# Patient Record
Sex: FEMALE | Race: WHITE | URBAN - METROPOLITAN AREA
[De-identification: names, ages, dates, MRNs, and addresses within clinical notes are randomized per-mention and may not be internally consistent; named-entity substitution may affect disease eponyms.]

---

## 2023-05-26 RX ORDER — FLUTICASONE PROPIONATE 50 MCG
2 SPRAY, SUSPENSION (ML) NASAL DAILY
COMMUNITY
Start: 2012-01-13

## 2023-05-26 RX ORDER — ZOLPIDEM TARTRATE 5 MG/1
TABLET ORAL
COMMUNITY
Start: 2022-05-03

## 2023-07-18 ENCOUNTER — TELEPHONE (OUTPATIENT)
Facility: HOSPITAL | Age: 53
End: 2023-07-18

## 2023-07-18 NOTE — TELEPHONE ENCOUNTER
[facility]  Breast Cancer Nurse Navigator Encounter    Name: Marion Leslie  Age: 47 y.o.  : 1970  Diagnosis: breast mass    Encounter type:  [x]Initial Navigator Encounter  []Patient Initiated  []Navigator Follow-up  []Other:     Narrative:   Called pt to introduce self/role of NN and to discuss upcoming consult with Dr. Eve Mendez. Pt states she is on the way to [facility] to have the breast mass biopsied. No official diagnosis as of yet, however, pt says the radiologist who performed her imaging suggested it was likely cancer. She has been having screening mammograms every 6 months at [facility] due to being high risk. She was advised to have a screening MRI which was done at the end of  at [facility]. She plans to bring a disc with images to her appointment on Monday. The MRI is what detected the mass. Per pt, it is measuring around 1.3 or 1.4cm and nothing else suspicious was noted in the breasts or lymph nodes. Reassured her that if this is an invasive cancer, she caught it early at stage I, which is curable. Pt, understandably, has been having a hard time since mass was detected and she was told it was likely cancer. She has discussed with her PCP and gotten a prescription for zoloft and clonazepam. The clonazepam has been helping but she may talk to her doctor about increasing her zoloft. She has support from friends and family, however no family is local. She has a sister that is 2 hours away. Provided pt my contact info and encouraged her to reach out as needed. Pt appreciative of the call. Will follow-up.      Interdisciplinary Team:  Surg-Onc:  Med-Onc:  Rad-Onc:  Plastics:  :   Nurse Navigator: Stephanie Min, RN      RAMÍREZ MetcalfN, RN, VIA [facility]  Breast Cancer Nurse [facility]  [address] [facility],[address] [facility]  W: [phone]  F: [phone]  [email or web address]   Good Help to Those in [facility] SPECIALTY [facility]

## 2023-07-21 ENCOUNTER — CLINICAL DOCUMENTATION (OUTPATIENT)
Age: 53
End: 2023-07-21

## 2023-07-21 NOTE — PROGRESS NOTES
Patient CD & Reports were dropped off at [facility] office by Hieu shea [facility].  Cd/reports were given to [address] [place] [address] for patient appointment  for 07/24/23

## 2023-07-24 ENCOUNTER — TELEPHONE (OUTPATIENT)
Age: 53
End: 2023-07-24

## 2023-07-24 ENCOUNTER — OFFICE VISIT (OUTPATIENT)
Age: 53
End: 2023-07-24
Payer: COMMERCIAL

## 2023-07-24 ENCOUNTER — CLINICAL DOCUMENTATION (OUTPATIENT)
Facility: HOSPITAL | Age: 53
End: 2023-07-24

## 2023-07-24 DIAGNOSIS — C50.912 INVASIVE DUCTAL CARCINOMA OF BREAST, FEMALE, LEFT (HCC): Primary | ICD-10-CM

## 2023-07-24 PROCEDURE — 99205 OFFICE O/P NEW HI 60 MIN: CPT | Performed by: SURGERY

## 2023-07-24 RX ORDER — CLONAZEPAM 0.5 MG/1
0.5 TABLET ORAL 2 TIMES DAILY PRN
COMMUNITY

## 2023-07-24 RX ORDER — CLONAZEPAM 0.5 MG/1
0.5 TABLET ORAL 2 TIMES DAILY PRN
COMMUNITY
End: 2023-07-24

## 2023-07-24 RX ORDER — CLONAZEPAM 0.25 MG/1
0.25 TABLET, ORALLY DISINTEGRATING ORAL 2 TIMES DAILY PRN
COMMUNITY
End: 2023-07-24 | Stop reason: CLARIF

## 2023-07-24 RX ORDER — SERTRALINE HYDROCHLORIDE 25 MG/1
25 TABLET, FILM COATED ORAL DAILY
COMMUNITY

## 2023-07-24 NOTE — PROGRESS NOTES
[facility]  Breast Navigator Encounter    Name:    Yehuda Soler  Age:    47 y.o. Diagnosis:   LEFT breast cancer    Called [facility] to check on receptors - not back yet. These will be faxed to me when available. Called Rogelio Camarena at [facility] to check to see if the patient had a MRI done there. She said that this was scheduled for 6/7 and 6/26, but both of these appointments were cancelled. She did have a contrast enhanced mammogram and U/S on 6/29/2023 which she will fax over. I did receive these and forward to Dr. Lolita Phan nurse.                               Sherryle Ok, RN, BSN, [facility]  Oncology Breast Navigator     [address], [address] [place] [address]  W: [phone]  F: [phone]  [email or web address]  Good Help to Those in [facility] SPECIALTY [facility]

## 2023-07-24 NOTE — PROGRESS NOTES
HISTORY OF PRESENT ILLNESS  Freya Jackson is a 47 y.o. female. HPI   NEW patient consult referred by Dr. Leti Bang for LEFT breast cancer. Found by imaging. Patient denies any changes to breasts. Patient would like BL nipple sparing mastectomies with reconstruction. : Negative genetic testing per patient, states this may have just been a BRCA1/2. Would like to meet with a genetic counselor. Family History: Mother, breast cancer, dx 80  Maternal aunt, breast cancer, dx 47-56s, lung cancer  Maternal aunt, ovarian cancer, dx 45s  Father, colon cancer, dx 62s,  at 67 from colon cancer           BL contrast enhanced mammo/L US, 23, [facility], BIRADS 5, LEFT breast irregular solid mass 3:00 3 cmfn 1.4 x 1.4 x 0.8 cm     Had MRI scheduled- was unable to complete           Review of Systems   Neurological:  Positive for headaches. Hematological:  Bruises/bleeds easily. 23: LEFT breast, Stellate Mass, Stereotatic biopsy: invasive and in situ carcinoma, grade 2. Largest tumor focus measured 4mm. No lymphovascular invasion seen. Prognostic markers pending. Past Medical History:   Diagnosis Date    Anxiety     Arrhythmia     MURMUR       Past Surgical History:   Procedure Laterality Date    APPENDECTOMY      COLONOSCOPY      GI      COLONOSCOPY    OR UNLISTED PROCEDURE ABDOMEN PERITONEUM & OMENTUM  2000    BOWEL OBSTRUCTION       Social History     Socioeconomic History    Marital status:      Spouse name: Not on file    Number of children: Not on file    Years of education: Not on file    Highest education level: Not on file   Occupational History    Not on file   Tobacco Use    Smoking status: Former     Packs/day: 0.50     Types: Cigarettes     Quit date: 2001     Years since quittin.0    Smokeless tobacco: Never   Substance and Sexual Activity    Alcohol use:  Yes     Alcohol/week: 5.0 standard drinks    Drug use: Not on file    Sexual

## 2023-07-24 NOTE — PROGRESS NOTES
HISTORY OF PRESENT ILLNESS  Artur Montanez is a 47 y.o. female     HPI NEW patient consult referred by Dr. Marivel Biggs for LEFT breast cancer. Found by imaging. Patient denies any changes to breasts. Patient would like BL nipple sparing mastectomies with reconstruction. : Negative genetic testing per patient, states this may have just been a BRCA1/2. Would like to meet with a genetic counselor. Family History: Mother, breast cancer, dx 80  Maternal aunt, breast cancer, dx 47-56s, lung cancer  Maternal aunt, ovarian cancer, dx 45s  Father, colon cancer, dx 62s,  at 67 from colon cancer        BL contrast enhanced mammo/L US, 23, [facility], BIRADS 5, LEFT breast irregular solid mass 3:00 3 cmfn 1.4 x 1.4 x 0.8 cm    Had MRI scheduled- was unable to complete        Review of Systems   Neurological:  Positive for headaches. Hematological:  Bruises/bleeds easily.        Physical Exam       ASSESSMENT and PLAN  {Assessment and Plan Chronic Disease:[id number]}

## 2023-07-24 NOTE — TELEPHONE ENCOUNTER
Mammaprint TRF faxed to [facility]. Confirmation fax received. Insurance letter mailed to patient.

## 2023-07-25 ENCOUNTER — CLINICAL DOCUMENTATION (OUTPATIENT)
Age: 53
End: 2023-07-25

## 2023-07-26 ENCOUNTER — CLINICAL DOCUMENTATION (OUTPATIENT)
Age: 53
End: 2023-07-26

## 2023-07-26 NOTE — PROGRESS NOTES
risk for certain cancers regardless of germline genetic test results. In this situation, health practitioners may recommend high-risk management or more frequent or sooner cancer screenings even when germline testing is negative. I counseled about cancer risk factors, including modifiable and non-modifiable risk. We reviewed lifestyle and behavioral strategies for reducing modifiable cancer risks. Implications for Family Members  We discussed the concept of autosomal dominant genes and implications for children and other family members. We discussed the concept of cascade testing for family members. The patient understands that if results are positive, children will have a 50% chance of inheriting that same positive result. She has two daughters in their early 25s. We discussed that her sisters and daughters are at higher risk for breast cancer due to family history. We discussed encouraging these individuals to talk with their OB/GYN or PCP regarding family history and personalized cancer screening plans based on risks. I reviewed that her M. Aunt with ovarian cancer could consider undergoing genetic testing and findings could help better illuminate risk for other family members. The patient explained that other than her mother, her maternal family members live in a different country and they do not have much contact or communication. Insurance  We discussed that (in most cases) the laboratory performing the genetic analysis will reach out to the patient's insurance after receiving the sample to determine an estimated OOP cost. I reviewed that the laboratory will usually EMAIL or TEXT the patient to discuss estimated OOP range with insurance or cost using self-pay options. For [employer], patients are only contacted if OOP cost is >$100. I encouraged the patient to respond to any communication from the genetics lab.  I explained that the patient must elect the self-pay option if s desired, otherwise

## 2023-07-26 NOTE — PROGRESS NOTES
Received vm from patient. She had a question about an oncology appointment on Aug. 24.  Recommended she call back with question.

## 2023-07-27 ENCOUNTER — OFFICE VISIT (OUTPATIENT)
Age: 53
End: 2023-07-27

## 2023-07-27 DIAGNOSIS — C50.912 INVASIVE DUCTAL CARCINOMA OF BREAST, FEMALE, LEFT (HCC): Primary | ICD-10-CM

## 2023-07-27 DIAGNOSIS — Z80.3 FAMILY HISTORY OF MALIGNANT NEOPLASM OF BREAST: ICD-10-CM

## 2023-07-27 DIAGNOSIS — Z80.41 FAMILY HX OF OVARIAN MALIGNANCY: ICD-10-CM

## 2023-07-27 DIAGNOSIS — Z80.8 FAMILY HISTORY OF MELANOMA: ICD-10-CM

## 2023-07-27 DIAGNOSIS — Z71.83 ENCOUNTER FOR NONPROCREATIVE GENETIC COUNSELING AND TESTING: ICD-10-CM

## 2023-07-27 DIAGNOSIS — Z80.0 FAMILY HISTORY OF COLON CANCER: ICD-10-CM

## 2023-07-27 DIAGNOSIS — Z13.71 ENCOUNTER FOR NONPROCREATIVE GENETIC COUNSELING AND TESTING: ICD-10-CM

## 2023-07-28 ENCOUNTER — TELEPHONE (OUTPATIENT)
Facility: HOSPITAL | Age: 53
End: 2023-07-28

## 2023-07-28 NOTE — TELEPHONE ENCOUNTER
[facility]  Breast Cancer Nurse Navigator Encounter    Name: Trudee Habermann  Age: 47 y.o.  : 1970  Diagnosis: Left breast IDC; ER+/MT+/HER2-    Encounter type:  []Initial Navigator Encounter  []Patient Initiated  [x]Navigator Follow-up  []Other:     Narrative:   Received message from TARAH Bird about pt's med/onc consult with Dr. Trevor Case. Initially, this consult was scheduled for  and there was a question of whether or not it could be sooner. We were able to reschedule pt's appointment to  at 1pm. A Mammaprint was ordered and allowing time for this to result may have been why it was scheduled further out. The order was placed on  so there is a strong likelihood it will be back in time for the appointment on . Pt understands that if it has not resulted we may have to push the appointment out. No questions about the Mammaprint or med/onc consult. She met with one of our [employer], yesterday. She is still unsure about which direction to go with additional testing. She understands she can call [facility] with any questions and will discuss more with Dr. Trevor Case.      Interdisciplinary Team:  Surg-Onc: Bautista Tirado MD  Med-Onc: Bay Adame MD  Rad-Onc:  Plastics:  :   Nurse Navigator: TARAH Cummings BSN, RN, VIA [facility]  Breast Cancer Nurse Navigator    [address]  W: [phone]  F: [phone]  [email or web address]   Good Help to Those in [facility]

## 2023-07-31 ENCOUNTER — TELEPHONE (OUTPATIENT)
Age: 53
End: 2023-07-31

## 2023-08-03 ENCOUNTER — TELEPHONE (OUTPATIENT)
Age: 53
End: 2023-08-03

## 2023-08-03 NOTE — TELEPHONE ENCOUNTER
Type of Film: [x] CD [] FILMS  Type of Test: [] MRI [x] MAMMO/US  From: [facility]  Given to: [facility]  LOCATION  To be Downloaded into PACS:  Too Law

## 2023-08-07 ENCOUNTER — TELEPHONE (OUTPATIENT)
Age: 53
End: 2023-08-07

## 2023-08-07 NOTE — TELEPHONE ENCOUNTER
Discussed Lymph node pathology and fact that recommendation my be for neoadjuvant chemo given high risk MP. She sees Dr. Harish Mena next week.

## 2023-08-07 NOTE — TELEPHONE ENCOUNTER
Telephone call from patient    She states she has had additional work up since we last spoke. She shared that she has also had time to think about the information we discussed and speak with family members. She states that she wishes to proceed with the [address] Cancer Next panel. She states she prefers this option over the disease focused panel because she worries that if anything arises in the future with new family history she does not want to regret undergoing testing with a larger panel. Her initial test was a more narrow, focused BRCA only panel. Reviewed limitations and benefits of Cancer Next panel. Discussed option of blood vs. Saliva differences and limitations and benefits of each option. Patient expressed a desire to proceed with saliva sample. If for any reason there is need for a repeat collection (sample quality etc) we discussed option of obtaining blood sample when she sees Dr. Kavin Fu. We discussed obtaining sample today at [facility] location. The patient was given time to ask questions. All questions answered. The patient communicated an understanding of and agreement with the plan.

## 2023-08-08 ENCOUNTER — HOSPITAL ENCOUNTER (OUTPATIENT)
Facility: HOSPITAL | Age: 53
Setting detail: INFUSION SERIES
Discharge: HOME | End: 2023-08-08

## 2023-08-08 ENCOUNTER — TELEPHONE (OUTPATIENT)
Age: 53
End: 2023-08-08

## 2023-08-08 VITALS
RESPIRATION RATE: 17 BRPM | BODY MASS INDEX: 17.65 KG/M2 | HEART RATE: 72 BPM | OXYGEN SATURATION: 97 % | TEMPERATURE: 97.9 F | SYSTOLIC BLOOD PRESSURE: 119 MMHG | HEIGHT: 64 IN | DIASTOLIC BLOOD PRESSURE: 77 MMHG | WEIGHT: 103.4 LBS

## 2023-08-08 ASSESSMENT — PAIN SCALES - GENERAL: PAINLEVEL_OUTOF10: 0

## 2023-08-08 NOTE — TELEPHONE ENCOUNTER
SW called pt this afternoon to introduce self and explain roles. Pt did not answer, SW left voicemail.

## 2023-08-09 ENCOUNTER — TELEPHONE (OUTPATIENT)
Age: 53
End: 2023-08-09

## 2023-08-09 NOTE — TELEPHONE ENCOUNTER
Colleague messaged SW to share that the pt's mother passed away this morning. Called pt to offer support and condolences. Pt sounded tearful and said that her mother's passing was unexpected. While on the phone, pt said that she was driving out of a parking deck which caused the call to break up. SW suggested to continue the conversation tomorrow so that the pt could focus on driving. Explained the roles of SW to the pt and planned for a phone call tomorrow. Also emailed the pt TEE's office phone number and offered further condolences. TEE will follow up with the pt tomorrow (8/10).

## 2023-08-09 NOTE — TELEPHONE ENCOUNTER
Telephone call from patient sharing that her mother  unexpectedly this morning. Provided support. We had previously discussed resources including [employer] and patient advocate, as well as nurse navigator and  at [facility]. Patient requested to be connected with  and stated that she would appreciate talking with Laila Frost (Patient Advocate for [employer]). Patient provided permission to reach out to both individuals as well as her physician team and share this update. I have reached out to these individuals and reviewed my contact information with the patient as well. Patient instructed to contact me with any needs or concerns. She expressed that she has good family support, including her spouse, a sister who is  a physician at [employer], and young adult children. The patient was given time to ask questions. All questions answered. The patient communicated an understanding of and agreement with the plan.      Libia SINGH RN ANP-BC OCN ACGN  Advanced Clinical Genetics Nurse  Oncology Nurse Practitioner  Mobile: [phone]   Fax: [phone]  [email or web address]    The [address] [facility], [address] [facility] [address], [address], [facility], [address] [facility], [address], [facility], [place] 2, [address]    To schedule an appointment Tel: [phone]

## 2023-08-10 ENCOUNTER — TELEPHONE (OUTPATIENT)
Age: 53
End: 2023-08-10

## 2023-08-14 ENCOUNTER — CLINICAL DOCUMENTATION (OUTPATIENT)
Facility: HOSPITAL | Age: 53
End: 2023-08-14

## 2023-08-14 ENCOUNTER — INITIAL CONSULT (OUTPATIENT)
Age: 53
End: 2023-08-14

## 2023-08-14 ENCOUNTER — CLINICAL DOCUMENTATION (OUTPATIENT)
Age: 53
End: 2023-08-14

## 2023-08-14 ENCOUNTER — RESEARCH ENCOUNTER (OUTPATIENT)
Age: 53
End: 2023-08-14

## 2023-08-14 VITALS
HEART RATE: 72 BPM | SYSTOLIC BLOOD PRESSURE: 120 MMHG | DIASTOLIC BLOOD PRESSURE: 68 MMHG | WEIGHT: 105.4 LBS | OXYGEN SATURATION: 98 % | TEMPERATURE: 98.6 F | HEIGHT: 64 IN | BODY MASS INDEX: 17.99 KG/M2 | RESPIRATION RATE: 18 BRPM

## 2023-08-14 DIAGNOSIS — Z51.81 ENCOUNTER FOR MONITORING CARDIOTOXIC DRUG THERAPY: ICD-10-CM

## 2023-08-14 DIAGNOSIS — Z79.899 ENCOUNTER FOR MONITORING CARDIOTOXIC DRUG THERAPY: ICD-10-CM

## 2023-08-14 DIAGNOSIS — Z17.0 MALIGNANT NEOPLASM OF CENTRAL PORTION OF LEFT BREAST IN FEMALE, ESTROGEN RECEPTOR POSITIVE (HCC): Primary | ICD-10-CM

## 2023-08-14 DIAGNOSIS — C50.112 MALIGNANT NEOPLASM OF CENTRAL PORTION OF LEFT BREAST IN FEMALE, ESTROGEN RECEPTOR POSITIVE (HCC): Primary | ICD-10-CM

## 2023-08-14 DIAGNOSIS — Z76.89 ENCOUNTER FOR PREVENTION OF NEUTROPENIA DUE TO CHEMOTHERAPY: ICD-10-CM

## 2023-08-14 DIAGNOSIS — Z51.11 ENCOUNTER FOR ANTINEOPLASTIC CHEMOTHERAPY: ICD-10-CM

## 2023-08-14 RX ORDER — LIDOCAINE AND PRILOCAINE 25; 25 MG/G; MG/G
CREAM TOPICAL
Qty: 30 G | Refills: 3 | Status: SHIPPED | OUTPATIENT
Start: 2023-08-14

## 2023-08-14 RX ORDER — ONDANSETRON HYDROCHLORIDE 8 MG/1
8 TABLET, FILM COATED ORAL EVERY 8 HOURS PRN
Qty: 60 TABLET | Refills: 3 | Status: SHIPPED | OUTPATIENT
Start: 2023-08-14

## 2023-08-14 RX ORDER — PROCHLORPERAZINE MALEATE 10 MG
10 TABLET ORAL EVERY 6 HOURS PRN
Qty: 60 TABLET | Refills: 3 | Status: SHIPPED | OUTPATIENT
Start: 2023-08-14

## 2023-08-14 RX ORDER — OLANZAPINE 2.5 MG/1
2.5 TABLET ORAL NIGHTLY
Qty: 30 TABLET | Refills: 3 | Status: SHIPPED | OUTPATIENT
Start: 2023-08-14

## 2023-08-14 NOTE — PROGRESS NOTES
[facility]  Breast Cancer Nurse Navigator Encounter    Name: Viola Fontanez  Age: 47 y.o.  : 1970  Diagnosis: Left breast IDC; ER+/AK+/HER2-    Encounter type:  []Initial Navigator Encounter  []Patient Initiated  [x]Navigator Follow-up  []Other:     Narrative:   Met with pt while in clinic for her med/onc consult. Reintroduced self/role. Pt accompanied by her , Mayte Gomez. Pt shares that her mother unexpectedly passed away last week and she just got home from [place] yesterday. She has been doing okay despite these additional stressors. She understands that she is here to discuss chemo. No questions or concerns for NN at this time. Pt has my contact info and will reach out as needed.      Interdisciplinary Team:  Surg-Onc: Juan Zarate MD  Med-Onc: Teresa Carmona MD  Rad-Onc:  Plastics:  : DANNIE Capellan  Nurse Navigator: TARAH Owens BSN, RN, VIA [facility]  Breast Cancer Nurse [address] [place] [address],[address]  W: [phone]  F: [phone]  [email or web address]   Good Help to Those in [facility] SPECIALTY [facility]

## 2023-08-14 NOTE — PROGRESS NOTES
Pt has been screened for all eligibility/ineligibility criteria and has been determined eligible for this protocol. Informed consent was reviewed by RN with patient prior to signing. Patient was informed that participation is voluntary and she has the right to withdraw at anytime without prejudice. The patient has been deemed of adequate mental and emotional capacity to give an informed consent per Dr. Colt Tran. Possible side effects were discussed in detail, with patient being advised to inform RN or Dr. Colt Tran immediately in the event of any side effects once drug is started or at any time should she have any questions. All questions were answered adequately by RN or Dr. Colt Tran. Patient signed consent today for study FB-12: \"Consent Form for Pre-Entry Screening to Obtain Breast Tumor Tissue for HER2 Signaling Function Test.\"  The patient was accompanied by her . A copy of ICF given to patient. No additional questions at this time.

## 2023-08-14 NOTE — PROGRESS NOTES
Pharmacy Note- Chemotherapy Education    Alex Valencia is a  47 y. o.female  diagnosed with breast cancer here today for chemotherapy counseling. Ms. Jennye Phoenix is being treated with [facility] followed by paclitaxel (weekly). Provided education on side effects and premedications. Side effects of chemotherapy reviewed with provider and patient chose to focus on supportive medications, diet and hydration instead. We discussed Neulasta, its purpose, frequency and side effects, as well as using Claritin and Tylenol to ameliorate the bone pain. We discussed all supportive medications and their drug interactions. The patient was given written instructions on how to take each one and how to avoid drug interactions between olanzapine, prochlorperazine and the anxiolytics and sleeping aids that she currently uses. Ms Jennye Phoenix verbalized understanding. We reviewed how important adequate hydration is in the setting of cyclophosphamide as well as the flu-like illness that may be caused by the paclitaxel and ways to alleviate that. Patient given ways to manage these side effects and when to contact office. [employer] Handout of medications provided to patient. Ms. Jennye Phoenix verbalized understanding of the information presented and all of the patient's questions were answered. Chemotherapy/Immunotherapy education and consent discussed with the patient and the patient denied any questions or concerns. A hard copy of the chemotherapy consent was offered to the patient and the patient declined (uploaded to [employer]).     Simona Jj, PharmD, BCPS    For Pharmacy Admin Tracking Only    Program: Medical Group  CPA in place:  Yes  Recommendation Provided To: Patient/Caregiver: 1 via In person  Intervention Detail: New Rx: 6, reason: Improve Adherence, Interaction, Needs Additional Therapy  Intervention Accepted By: Patient/Caregiver: 1  Time Spent (min): 60

## 2023-08-14 NOTE — PROGRESS NOTES
[facility] [place] at [address], [address] [place] [address]  Nicolasa Poster: [phone]  F: [phone]      Reason for Visit:   Danica Rojo is a 47 y.o. female who is seen in consultation at the request of Dr. Georgi Lebron for evaluation of therapy for breast cancer. Treatment History:   23 left breast stellate mass, core bx:  IDC, gr 2, 4 mm, ER + at 98%, CO + at 54%, HER 2 negative at [facility] 1+ (SOHA ration 1; sig/cell 1.85), ki67 59%, DCIS, no LVI  Mammaprint shows luminal B, high risk, index -0.160  23 left axilla LN core bx:  + for breast cancer  Genetic testing pending, negative BRCA 1/2 by [employer]     History of Present Illness: An abnormal mammogram led to the pathology above. FH:  mother with breast cancer, dx at age 80; maternal aunt with breast cancer, dx 47-56s and lung cancer; maternal aunt:  ovarian cancer, dx 45s; father with colon cancer dx 62s    Lmp 2022    Past Medical History:   Diagnosis Date    Anxiety     Arrhythmia     MURMUR    Invasive ductal carcinoma of breast, female, left ([address]) 2023: LEFT breast, Stellate Mass, Stereotatic biopsy: invasive and in situ carcinoma, grade 2. Largest tumor focus measured 4mm. No lymphovascular invasion seen. Prognostic markers pending. Past Surgical History:   Procedure Laterality Date    APPENDECTOMY      COLONOSCOPY      GI      COLONOSCOPY    CO UNLISTED PROCEDURE ABDOMEN PERITONEUM & OMENTUM  2000    BOWEL OBSTRUCTION    US BREAST BIOPSY W LOC DEVICE 1ST LESION LEFT Left 2023    US BREAST LYMPH NODE BIOPSY LEFT    US BREAST BIOPSY W LOC DEVICE 1ST LESION LEFT Left 2023    US BREAST LYMPH NODE BIOPSY LEFT      Social History     Tobacco Use    Smoking status: Former     Packs/day: 0.50     Types: Cigarettes     Quit date: 2001     Years since quittin.0    Smokeless tobacco: Never   Substance Use Topics    Alcohol use:  Yes     Alcohol/week: 5.0

## 2023-08-15 ENCOUNTER — TELEPHONE (OUTPATIENT)
Facility: HOSPITAL | Age: 53
End: 2023-08-15

## 2023-08-15 ENCOUNTER — RESEARCH ENCOUNTER (OUTPATIENT)
Age: 53
End: 2023-08-15

## 2023-08-15 ENCOUNTER — TELEPHONE (OUTPATIENT)
Age: 53
End: 2023-08-15

## 2023-08-15 DIAGNOSIS — C50.112 MALIGNANT NEOPLASM OF CENTRAL PORTION OF LEFT BREAST IN FEMALE, ESTROGEN RECEPTOR POSITIVE (HCC): Primary | ICD-10-CM

## 2023-08-15 DIAGNOSIS — Z51.11 ENCOUNTER FOR ANTINEOPLASTIC CHEMOTHERAPY: ICD-10-CM

## 2023-08-15 DIAGNOSIS — Z17.0 MALIGNANT NEOPLASM OF CENTRAL PORTION OF LEFT BREAST IN FEMALE, ESTROGEN RECEPTOR POSITIVE (HCC): Primary | ICD-10-CM

## 2023-08-15 NOTE — PROGRESS NOTES
Spoke with patient regarding some questions she had about starting chemo treatments. Answered all questions adequately. Patient requests to delay her chemo start date to 2023 if possible due to her mother's  service being  or . Dr. Celestine Dugan approved this request.  Notified scheduling, NN, and surgeon's office of the change. No further questions at this time.

## 2023-08-15 NOTE — TELEPHONE ENCOUNTER
Portacath insertion, US guided core bx of left breast mass, mac, [facility], 8/31

## 2023-08-15 NOTE — TELEPHONE ENCOUNTER
[facility]  Breast Cancer Nurse Navigator Encounter    Name: Angelia Murillo  Age: 47 y.o.  : 1970  Diagnosis: Left breast IDC; ER+/LA+/HER2-    Encounter type:  []Initial Navigator Encounter  [x]Patient Initiated  []Navigator Follow-up  []Other:     Narrative:   Pt called with questions about upcoming treatment plan       Interdisciplinary Team:  Surg-Onc: Rob Oglesby MD  Med-Onc: Emery Eubanks MD  Rad-Onc:  Plastics:  : DANNIE Moss  Nurse Navigator: Anayeli Smith RN      Anayeli Smith, BSN, RN, [facility]  Breast Cancer Nurse Navigator    [address]  W: [phone]  F: [phone]  [email or web address]   Good Help to Those in [facility]

## 2023-08-17 ENCOUNTER — TELEPHONE (OUTPATIENT)
Age: 53
End: 2023-08-17

## 2023-08-17 ENCOUNTER — RESEARCH ENCOUNTER (OUTPATIENT)
Age: 53
End: 2023-08-17

## 2023-08-17 ENCOUNTER — PREP FOR PROCEDURE (OUTPATIENT)
Age: 53
End: 2023-08-17

## 2023-08-17 DIAGNOSIS — C50.912 MALIGNANT NEOPLASM OF LEFT FEMALE BREAST, UNSPECIFIED ESTROGEN RECEPTOR STATUS, UNSPECIFIED SITE OF BREAST (HCC): Primary | ICD-10-CM

## 2023-08-17 NOTE — TELEPHONE ENCOUNTER
Spoke with the patient over the phone. She is very anxious about research study and would like Dr. Zenaida Rodas opinion on this. Has lots of questions. She does not want to go through all the extra scans/apts if the study will not be super beneficial to her. I was unable to answer her questions. I told her I would ask Dr. Sunday Valderrama to give her a call. She has to make her decision by tomorrow. She also asked if he give her sister who is a doctor (Dr. Danielle Cifuentes) a call. I will reach out to him.

## 2023-08-17 NOTE — PROGRESS NOTES
Spoke with patient regarding scheduling her screening assessments for clinical trial.  Patient is having second thoughts on trial.  Would like to discuss with other care providers and her family before moving forward. Advised patient to call me when she's made her decision. No further questions.

## 2023-08-17 NOTE — TELEPHONE ENCOUNTER
[address] [address]  Social Work Encounter    Name: Valdo Mathur  Age: 47 y.o.  : 1970  Diagnosis: Left breast IDC; ER+/CT+/HER2-    Encounter type:  []Initial Social Work Encounter  []Patient Initiated  [x]Social Work Follow-up  []Other:     Narrative:   Rec'd VM from pt (8/15) asking if SW knew of any psychiatrists to help with medication management. Communicated with onc SW as she has been helping the pt with this request as well. Called the pt back this morning to check-in and she explained that she was feeling anxious about moving forward with the research study. She would like to talk with her surgeon to get more information and his opinion. She shared that she needs to make a decision by the end of the day tomorrow since there are several test/scans that need to be completed prior to moving forward with the study. Pt said that she feels like her \"cup is overflowing\", since she has recently had some additional stressors. She received a call from another provider while on the phone with SW-- encouraged her to take the call, so phone call ended before SW could ask the pt about psychiatry recommendations. Let the pt know that she can call SW back at her convenience.        Interdisciplinary Team:  Surg-Onc: Alphonso Teran MD  Med-Onc: Leanne Medel MD  Rad-Onc:  Plastics:  : DANNIE Samuel  Nurse Navigator: TARAH Dinero MSW      [facility]  W: [phone]  [email or web address]   Good Help to Those in [facility] SPECIALTY [facility]

## 2023-08-18 ENCOUNTER — CLINICAL DOCUMENTATION (OUTPATIENT)
Age: 53
End: 2023-08-18

## 2023-08-18 ENCOUNTER — TELEPHONE (OUTPATIENT)
Age: 53
End: 2023-08-18

## 2023-08-18 NOTE — TELEPHONE ENCOUNTER
The patient called and wants to move her surgery to 8/29 instead of 8/31. Will reach out to surgery scheduler.

## 2023-08-18 NOTE — PROGRESS NOTES
Oncology Social Work  Psychosocial Assessment    Reason for Assessment:      [x] Social Work Referral [x] Initial Assessment [x] New Diagnosis [] Other    Advance Care Planning:  Demographics 8/31/2023   Marital Status        Sources of Information:    [x]Patient  [x]Family  [x]Staff  [x]Medical Record    Mental Status:    [x]Alert  []Lethargic  []Unresponsive   [] Unable to assess   Oriented to:  [x]Person  [x]Place  [x]Time  [x]Situation      Relationship Status:  []Single  [x]  []Significant Other/Life Partner  []  []  []    Living Circumstances:  []Lives Alone  [x]Family/Significant Other in Household  []Roommates  [x]Children in the Home  []Paid Caregivers  []Assisted Living Facility/Group [address] [place] [address]  []Homeless  []Incarcerated  []Environmental/Care Concerns  []Other:    Employment Status:  []Employed Full-time [x]Employed Part-time []Homemaker  [] Retired [] Short-Term Disability [] Long-Term Disability  [] Unemployed   [] [id number] [facility]   [] SSDI  [] Self-Employment    Barriers to Learning:    []Language  []Developmental  []Cognitive  []Altered Mental Status  []Visual/Hearing Impairment  []Unable to Read/Write  []Motivational  [] Challenges Understanding Medical Jargon []No Barriers Identified      Financial/Legal Concerns:    []Uninsured  []Limited Income/Resources  []Non-Citizen  []Food Insecurity [x]No Concerns Identified   []Other:    Zoroastrianism/Spiritual/Existential:  Does patient have any spiritual or Mandaeism beliefs? [] Yes [] No  Is the patient involved in a spiritual, good or Mandaeism community?  [] Yes [] No  Patient expressing spiritual/existential angst? [] Yes [x] No  Notes:    Support System:    Identified Support Person/Group: Cedrick () & Dr. Romeo Osorio (sister)  [x]Strong  []Fair  []Limited    Coping with Illness:   []  Coping Well  [x] Challenges Coping with Serious Illness [x] Situational Depression [x] Situational Anxiety []

## 2023-08-21 ENCOUNTER — TELEPHONE (OUTPATIENT)
Age: 53
End: 2023-08-21

## 2023-08-21 NOTE — TELEPHONE ENCOUNTER
Patient called and stated she can get the first shingles shot this Wednesday but the second dose will be once she has already started chemo. She stated she wants to know if Dr. Wilber Aparicio wants her to go ahead and get the first shingles shot. She also wants to know if she can go ahead and get the flu shot or if that will alter her lab results and pap smear results for Wednesday. Please advise and call patient back.     CB# [phone]

## 2023-08-21 NOTE — TELEPHONE ENCOUNTER
Rogelio Reynolds at [facility]  [phone]    08/21/23 5:34 PM EDT - Called patient back after double-checking with Dr. Kavin Fu. Advised patient that she can go ahead and get her shingles and flu vaccines. Advised that they shouldn't interfere with any testing as well. Answered all of the patients questions. Patient had no further questions at this time.

## 2023-08-22 ENCOUNTER — TELEPHONE (OUTPATIENT)
Age: 53
End: 2023-08-22

## 2023-08-22 ENCOUNTER — HOSPITAL ENCOUNTER (OUTPATIENT)
Facility: HOSPITAL | Age: 53
Setting detail: INFUSION SERIES
Discharge: HOME | End: 2023-08-22

## 2023-08-22 NOTE — PROGRESS NOTES
Ms. Taveras [place] presented to infusion center for cold cap education and cap fitting. Written and verbal education given along with contact information for [facility] as well as infusion center. Time allowed for questions. Patient fitted with cap (S/S). Tour of infusion given with expectations of first day. Enrollment form completed and signed.

## 2023-08-22 NOTE — TELEPHONE ENCOUNTER
Team:  Surg-Onc: Nandini Ramirez MD  Med-Onc: Johny Chisholm MD  Rad-Onc:  Plastics:  : Merlinda Ringer, MSW  Nurse Navigator: Renny Savant, RN Merlinda Ringer, MSW      [facility]  W: [phone]  [email or web address]   Good Help to Those in [facility]

## 2023-08-23 ENCOUNTER — HOSPITAL ENCOUNTER (OUTPATIENT)
Facility: HOSPITAL | Age: 53
Discharge: HOME | End: 2023-08-26
Payer: COMMERCIAL

## 2023-08-23 VITALS
BODY MASS INDEX: 17.48 KG/M2 | RESPIRATION RATE: 12 BRPM | HEART RATE: 64 BPM | WEIGHT: 102.4 LBS | HEIGHT: 64 IN | TEMPERATURE: 98.1 F | SYSTOLIC BLOOD PRESSURE: 110 MMHG | DIASTOLIC BLOOD PRESSURE: 70 MMHG

## 2023-08-23 LAB
BASOPHILS # BLD: 0.1 K/UL (ref 0–0.1)
BASOPHILS NFR BLD: 1 % (ref 0–1)
DIFFERENTIAL METHOD BLD: NORMAL
EOSINOPHIL # BLD: 0.1 K/UL (ref 0–0.4)
EOSINOPHIL NFR BLD: 1 % (ref 0–7)
ERYTHROCYTE [DISTWIDTH] IN BLOOD BY AUTOMATED COUNT: 12.3 % (ref 11.5–14.5)
HCT VFR BLD AUTO: 39 % (ref 35–47)
HGB BLD-MCNC: 12.8 G/DL (ref 11.5–16)
IMM GRANULOCYTES # BLD AUTO: 0 K/UL (ref 0–0.04)
IMM GRANULOCYTES NFR BLD AUTO: 0 % (ref 0–0.5)
LYMPHOCYTES # BLD: 1 K/UL (ref 0.8–3.5)
LYMPHOCYTES NFR BLD: 16 % (ref 12–49)
MCH RBC QN AUTO: 30 PG (ref 26–34)
MCHC RBC AUTO-ENTMCNC: 32.8 G/DL (ref 30–36.5)
MCV RBC AUTO: 91.3 FL (ref 80–99)
MONOCYTES # BLD: 0.6 K/UL (ref 0–1)
MONOCYTES NFR BLD: 10 % (ref 5–13)
NEUTS SEG # BLD: 4.7 K/UL (ref 1.8–8)
NEUTS SEG NFR BLD: 72 % (ref 32–75)
NRBC # BLD: 0 K/UL (ref 0–0.01)
NRBC BLD-RTO: 0 PER 100 WBC
PLATELET # BLD AUTO: 203 K/UL (ref 150–400)
PMV BLD AUTO: 10 FL (ref 8.9–12.9)
RBC # BLD AUTO: 4.27 M/UL (ref 3.8–5.2)
WBC # BLD AUTO: 6.5 K/UL (ref 3.6–11)

## 2023-08-23 PROCEDURE — 85025 COMPLETE CBC W/AUTO DIFF WBC: CPT

## 2023-08-23 PROCEDURE — 36415 COLL VENOUS BLD VENIPUNCTURE: CPT

## 2023-08-24 ENCOUNTER — TELEPHONE (OUTPATIENT)
Age: 53
End: 2023-08-24

## 2023-08-24 NOTE — TELEPHONE ENCOUNTER
Had 4 missed calls from the pt from 8/23 and one voicemail. Voicemail sounded like a bunch of background noise. Called pt back to follow up. Pt said that the calls were a mistake. Pt did not need any assistance at this time.

## 2023-08-25 ENCOUNTER — TELEPHONE (OUTPATIENT)
Age: 53
End: 2023-08-25

## 2023-08-25 DIAGNOSIS — C50.912 MALIGNANT NEOPLASM OF LEFT FEMALE BREAST, UNSPECIFIED ESTROGEN RECEPTOR STATUS, UNSPECIFIED SITE OF BREAST (HCC): Primary | ICD-10-CM

## 2023-08-25 NOTE — PERIOP NOTE
PT CALLED AND STATED THAT SHE WAS PRESCRIBED TOPICAL THYMOL 4% FOR A BACTERIAL TOE INFECTION. PT WAS WONDERING IF SHE COULD START THIS PRIOR TO HER SURGERY. ADVISED PT THAT SHE WOULD NEED TO CALL DR. MCKEON'S OFFICE TO LET THEM KNOW AND THEY WOULD NEED TO MAKE THAT DECISION. PT VERBALIZED UNDERSTANDING. STATES SHE HAS ALREADY BEEN IN CONTACT WITH THEM.
Surgical consent  filled out and attached to chart, by not signed by patient. Need to sign day of surgery.
medicines and Aspirin containing products 7 days prior to surgery. Stop any non-steroidal anti-inflammatory drugs (i.e. Ibuprofen, Naproxen, Advil, Aleve) 3 days before surgery. You may take Tylenol. If you are currently taking Plavix, Coumadin,or any other blood-thinning/anticoagulant medication contact your prescribing physician for instructions. Eating and Drinking Before Surgery    You may eat a regular dinner at the usual time on the day before your surgery. Do NOT eat any solid foods after midnight. You may drink clear liquids only from 12 midnight until 1 hours prior to your arrival time at the hospital on the day of your surgery. Do NOT drink alcohol. Clear liquids include:  Water  Fruit juices without pulp( i.e. apple juice)  Carbonated beverages  Black coffee (no cream/milk)  Tea (no cream/milk)  Gatorade  You may drink up to 12-16 ounces at one time every 4 hours between the hours of midnight and 1 hour before your arrival time at the hospital. Example- if your arrival time at the hospital is 6am, you may drink 12-16 ounces of clear liquids no later than 5am.  If you have any questions, please contact your surgeon's office. Preventing Infections Before and After - Your Surgery    IMPORTANT INSTRUCTIONS    You play an important role in your health and preparation for surgery. To reduce the germs on your skin you will need to shower with CHG soap (Chorhexidine gluconate 4%) two times before surgery. CHG soap (Hibiclens, Hex-A-Clens or store brand)  CHG soap will be provided at your Preadmission Testing (PAT) appointment. If you do not have a PAT appointment before surgery, you may arrange to  CHG soap from our office or purchase CHG soap at a pharmacy, grocery or department store. You need to purchase TWO 4 ounce bottles to use for your 2 showers.     Steps to follow:  Wash your hair with your normal shampoo and your body with regular soap and rinse well to remove shampoo and soap

## 2023-08-26 NOTE — TELEPHONE ENCOUNTER
[facility] classifies this finding as benign and [employer] classifies it as likely benign. About VUS findings:    A VUS is a change in a gene that we currently do not have enough information about to determine if the finding is associated with cancer risk    Not all variants present in a gene cause cancer. As a result, it is not recommended that VUS findings be used to inform clinical care. Personal medical history and family history should be used to guide health care management (including cancer screening or surveillance) in the setting of a clinically negative test result. The classification for VUS findings will sometimes change as we learn more about the variant. It possible that our interpretation of this VUS may change with time. Patients are encouraged to maintain contact and inquire (at least annually) with the laboratory and ordering provider for updates about a VUS finding. Also, patients are encouraged to register on the genetic laboratory portal to view their test results and learn about updates or re-classification of VUS results. Sometimes laboratories will offer free family variant testing for relatives to gather more data on a specific VUS finding and better understand the finding. This is primarily for contributing to research / for research purposes and usually does not result in an immediate reclassification of a VUS. Implications of a Clinically Negative Finding  Although test results are clinically negative, there could be a pathogenic variant that explains your personal history or family history and we have not yet detected this variant. It is also possible there is a different gene that we have not yet tested that may be responsible for the cancer in your history or your family history. Your risk to develop cancer(s) may change over time. Share any new information about your family history with your doctors.     Personal medical history and family history should be used

## 2023-08-28 ENCOUNTER — CLINICAL DOCUMENTATION (OUTPATIENT)
Age: 53
End: 2023-08-28

## 2023-08-28 ENCOUNTER — ANESTHESIA EVENT (OUTPATIENT)
Facility: HOSPITAL | Age: 53
End: 2023-08-28
Payer: COMMERCIAL

## 2023-08-28 NOTE — PROGRESS NOTES
For Pharmacy Admin Tracking Only    Program: Medical Group  CPA in place:  Yes  Recommendation Provided To: Patient/Caregiver: 1 via CastingDB  Intervention Detail: New Rx: 1, reason: Interaction  Intervention Accepted By: Patient/Caregiver: 1  Time Spent (min): 5

## 2023-08-29 ENCOUNTER — APPOINTMENT (OUTPATIENT)
Facility: HOSPITAL | Age: 53
End: 2023-08-29
Attending: SURGERY
Payer: COMMERCIAL

## 2023-08-29 ENCOUNTER — ANESTHESIA (OUTPATIENT)
Facility: HOSPITAL | Age: 53
End: 2023-08-29
Payer: COMMERCIAL

## 2023-08-29 ENCOUNTER — HOSPITAL ENCOUNTER (OUTPATIENT)
Facility: HOSPITAL | Age: 53
Setting detail: OUTPATIENT SURGERY
Discharge: HOME | End: 2023-08-29
Attending: SURGERY | Admitting: SURGERY
Payer: COMMERCIAL

## 2023-08-29 VITALS
WEIGHT: 100.53 LBS | SYSTOLIC BLOOD PRESSURE: 128 MMHG | TEMPERATURE: 97.5 F | RESPIRATION RATE: 22 BRPM | OXYGEN SATURATION: 100 % | DIASTOLIC BLOOD PRESSURE: 78 MMHG | BODY MASS INDEX: 17.26 KG/M2 | HEART RATE: 88 BPM

## 2023-08-29 DIAGNOSIS — C50.912 MALIGNANT NEOPLASM OF LEFT FEMALE BREAST, UNSPECIFIED ESTROGEN RECEPTOR STATUS, UNSPECIFIED SITE OF BREAST (HCC): ICD-10-CM

## 2023-08-29 DIAGNOSIS — C50.912 INVASIVE DUCTAL CARCINOMA OF BREAST, FEMALE, LEFT (HCC): Primary | ICD-10-CM

## 2023-08-29 LAB
EKG ATRIAL RATE: 68 BPM
EKG DIAGNOSIS: NORMAL
EKG P AXIS: 70 DEGREES
EKG P-R INTERVAL: 168 MS
EKG Q-T INTERVAL: 410 MS
EKG QRS DURATION: 86 MS
EKG QTC CALCULATION (BAZETT): 435 MS
EKG R AXIS: 88 DEGREES
EKG T AXIS: -4 DEGREES
EKG VENTRICULAR RATE: 68 BPM

## 2023-08-29 PROCEDURE — C1788 PORT, INDWELLING, IMP: HCPCS | Performed by: SURGERY

## 2023-08-29 PROCEDURE — 2580000003 HC RX 258: Performed by: SURGERY

## 2023-08-29 PROCEDURE — 19083 BX BREAST 1ST LESION US IMAG: CPT | Performed by: SURGERY

## 2023-08-29 PROCEDURE — 6370000000 HC RX 637 (ALT 250 FOR IP): Performed by: ANESTHESIOLOGY

## 2023-08-29 PROCEDURE — 6360000002 HC RX W HCPCS: Performed by: NURSE ANESTHETIST, CERTIFIED REGISTERED

## 2023-08-29 PROCEDURE — 77001 FLUOROGUIDE FOR VEIN DEVICE: CPT | Performed by: SURGERY

## 2023-08-29 PROCEDURE — 2500000003 HC RX 250 WO HCPCS: Performed by: SURGERY

## 2023-08-29 PROCEDURE — 3700000001 HC ADD 15 MINUTES (ANESTHESIA): Performed by: SURGERY

## 2023-08-29 PROCEDURE — 6360000002 HC RX W HCPCS: Performed by: SURGERY

## 2023-08-29 PROCEDURE — 2580000003 HC RX 258: Performed by: ANESTHESIOLOGY

## 2023-08-29 PROCEDURE — 2709999900 HC NON-CHARGEABLE SUPPLY: Performed by: SURGERY

## 2023-08-29 PROCEDURE — 7100000000 HC PACU RECOVERY - FIRST 15 MIN: Performed by: SURGERY

## 2023-08-29 PROCEDURE — 71045 X-RAY EXAM CHEST 1 VIEW: CPT

## 2023-08-29 PROCEDURE — 3600000012 HC SURGERY LEVEL 2 ADDTL 15MIN: Performed by: SURGERY

## 2023-08-29 PROCEDURE — 3700000000 HC ANESTHESIA ATTENDED CARE: Performed by: SURGERY

## 2023-08-29 PROCEDURE — 36561 INSERT TUNNELED CV CATH: CPT | Performed by: SURGERY

## 2023-08-29 PROCEDURE — 93005 ELECTROCARDIOGRAM TRACING: CPT | Performed by: SURGERY

## 2023-08-29 PROCEDURE — 7100000001 HC PACU RECOVERY - ADDTL 15 MIN: Performed by: SURGERY

## 2023-08-29 PROCEDURE — 3600000002 HC SURGERY LEVEL 2 BASE: Performed by: SURGERY

## 2023-08-29 DEVICE — POWERPORT IMPLANTABLE PORT WITH ATTACHABLE 8F CHRONOFLEX OPEN-ENDED SINGLE-LUMEN VENOUS CATHETER INTERMEDIATE KIT (WITH SUTURE PLUGS)
Type: IMPLANTABLE DEVICE | Site: CHEST | Status: FUNCTIONAL
Brand: POWERPORT, CHRONOFLEX

## 2023-08-29 RX ORDER — HYDROMORPHONE HYDROCHLORIDE 1 MG/ML
0.5 INJECTION, SOLUTION INTRAMUSCULAR; INTRAVENOUS; SUBCUTANEOUS EVERY 5 MIN PRN
Status: DISCONTINUED | OUTPATIENT
Start: 2023-08-29 | End: 2023-08-29 | Stop reason: HOSPADM

## 2023-08-29 RX ORDER — ACETAMINOPHEN 500 MG
1000 TABLET ORAL ONCE
Status: DISCONTINUED | OUTPATIENT
Start: 2023-08-29 | End: 2023-08-29 | Stop reason: HOSPADM

## 2023-08-29 RX ORDER — SODIUM CHLORIDE 0.9 % (FLUSH) 0.9 %
5-40 SYRINGE (ML) INJECTION EVERY 12 HOURS SCHEDULED
Status: DISCONTINUED | OUTPATIENT
Start: 2023-08-29 | End: 2023-08-29 | Stop reason: HOSPADM

## 2023-08-29 RX ORDER — MIDAZOLAM HYDROCHLORIDE 2 MG/2ML
2 INJECTION, SOLUTION INTRAMUSCULAR; INTRAVENOUS
Status: DISCONTINUED | OUTPATIENT
Start: 2023-08-29 | End: 2023-08-29 | Stop reason: HOSPADM

## 2023-08-29 RX ORDER — HEPARIN 100 UNIT/ML
SYRINGE INTRAVENOUS PRN
Status: DISCONTINUED | OUTPATIENT
Start: 2023-08-29 | End: 2023-08-29 | Stop reason: HOSPADM

## 2023-08-29 RX ORDER — HYDRALAZINE HYDROCHLORIDE 20 MG/ML
10 INJECTION INTRAMUSCULAR; INTRAVENOUS
Status: DISCONTINUED | OUTPATIENT
Start: 2023-08-29 | End: 2023-08-29 | Stop reason: HOSPADM

## 2023-08-29 RX ORDER — BUPIVACAINE HYDROCHLORIDE 5 MG/ML
30 INJECTION, SOLUTION PERINEURAL ONCE
Status: DISCONTINUED | OUTPATIENT
Start: 2023-08-29 | End: 2023-08-29 | Stop reason: HOSPADM

## 2023-08-29 RX ORDER — OXYCODONE HYDROCHLORIDE 5 MG/1
5 TABLET ORAL
Status: DISCONTINUED | OUTPATIENT
Start: 2023-08-29 | End: 2023-08-29 | Stop reason: HOSPADM

## 2023-08-29 RX ORDER — LIDOCAINE HYDROCHLORIDE AND EPINEPHRINE 10; 10 MG/ML; UG/ML
30 INJECTION, SOLUTION INFILTRATION; PERINEURAL ONCE
Status: DISCONTINUED | OUTPATIENT
Start: 2023-08-29 | End: 2023-08-29 | Stop reason: HOSPADM

## 2023-08-29 RX ORDER — PROPOFOL 10 MG/ML
INJECTION, EMULSION INTRAVENOUS PRN
Status: DISCONTINUED | OUTPATIENT
Start: 2023-08-29 | End: 2023-08-29 | Stop reason: SDUPTHER

## 2023-08-29 RX ORDER — SCOLOPAMINE TRANSDERMAL SYSTEM 1 MG/1
1 PATCH, EXTENDED RELEASE TRANSDERMAL
Status: DISCONTINUED | OUTPATIENT
Start: 2023-08-29 | End: 2023-08-29 | Stop reason: HOSPADM

## 2023-08-29 RX ORDER — SODIUM CHLORIDE 0.9 % (FLUSH) 0.9 %
5-40 SYRINGE (ML) INJECTION PRN
Status: DISCONTINUED | OUTPATIENT
Start: 2023-08-29 | End: 2023-08-29 | Stop reason: HOSPADM

## 2023-08-29 RX ORDER — LIDOCAINE HYDROCHLORIDE 10 MG/ML
1 INJECTION, SOLUTION INFILTRATION; PERINEURAL
Status: DISCONTINUED | OUTPATIENT
Start: 2023-08-29 | End: 2023-08-29 | Stop reason: HOSPADM

## 2023-08-29 RX ORDER — SODIUM CHLORIDE 9 MG/ML
INJECTION, SOLUTION INTRAVENOUS PRN
Status: DISCONTINUED | OUTPATIENT
Start: 2023-08-29 | End: 2023-08-29 | Stop reason: HOSPADM

## 2023-08-29 RX ORDER — PROCHLORPERAZINE EDISYLATE 5 MG/ML
5 INJECTION INTRAMUSCULAR; INTRAVENOUS
Status: DISCONTINUED | OUTPATIENT
Start: 2023-08-29 | End: 2023-08-29 | Stop reason: HOSPADM

## 2023-08-29 RX ORDER — SODIUM CHLORIDE, SODIUM LACTATE, POTASSIUM CHLORIDE, CALCIUM CHLORIDE 600; 310; 30; 20 MG/100ML; MG/100ML; MG/100ML; MG/100ML
INJECTION, SOLUTION INTRAVENOUS CONTINUOUS
Status: DISCONTINUED | OUTPATIENT
Start: 2023-08-29 | End: 2023-08-29 | Stop reason: HOSPADM

## 2023-08-29 RX ORDER — MIDAZOLAM HYDROCHLORIDE 1 MG/ML
INJECTION INTRAMUSCULAR; INTRAVENOUS PRN
Status: DISCONTINUED | OUTPATIENT
Start: 2023-08-29 | End: 2023-08-29 | Stop reason: SDUPTHER

## 2023-08-29 RX ORDER — OXYCODONE HYDROCHLORIDE AND ACETAMINOPHEN 5; 325 MG/1; MG/1
1 TABLET ORAL EVERY 4 HOURS PRN
Qty: 15 TABLET | Refills: 0 | Status: SHIPPED | OUTPATIENT
Start: 2023-08-29 | End: 2023-09-01

## 2023-08-29 RX ORDER — ONDANSETRON 2 MG/ML
INJECTION INTRAMUSCULAR; INTRAVENOUS PRN
Status: DISCONTINUED | OUTPATIENT
Start: 2023-08-29 | End: 2023-08-29 | Stop reason: SDUPTHER

## 2023-08-29 RX ORDER — ONDANSETRON 2 MG/ML
4 INJECTION INTRAMUSCULAR; INTRAVENOUS
Status: DISCONTINUED | OUTPATIENT
Start: 2023-08-29 | End: 2023-08-29 | Stop reason: HOSPADM

## 2023-08-29 RX ORDER — FENTANYL CITRATE 50 UG/ML
25 INJECTION, SOLUTION INTRAMUSCULAR; INTRAVENOUS EVERY 5 MIN PRN
Status: DISCONTINUED | OUTPATIENT
Start: 2023-08-29 | End: 2023-08-29 | Stop reason: HOSPADM

## 2023-08-29 RX ORDER — FENTANYL CITRATE 50 UG/ML
100 INJECTION, SOLUTION INTRAMUSCULAR; INTRAVENOUS
Status: DISCONTINUED | OUTPATIENT
Start: 2023-08-29 | End: 2023-08-29 | Stop reason: HOSPADM

## 2023-08-29 RX ORDER — FENTANYL CITRATE 50 UG/ML
INJECTION, SOLUTION INTRAMUSCULAR; INTRAVENOUS PRN
Status: DISCONTINUED | OUTPATIENT
Start: 2023-08-29 | End: 2023-08-29 | Stop reason: SDUPTHER

## 2023-08-29 RX ADMIN — SODIUM CHLORIDE, POTASSIUM CHLORIDE, SODIUM LACTATE AND CALCIUM CHLORIDE: 600; 310; 30; 20 INJECTION, SOLUTION INTRAVENOUS at 13:40

## 2023-08-29 RX ADMIN — WATER 2000 MG: 1 INJECTION INTRAMUSCULAR; INTRAVENOUS; SUBCUTANEOUS at 14:13

## 2023-08-29 RX ADMIN — FENTANYL CITRATE 25 MCG: 50 INJECTION, SOLUTION INTRAMUSCULAR; INTRAVENOUS at 14:33

## 2023-08-29 RX ADMIN — FENTANYL CITRATE 50 MCG: 50 INJECTION, SOLUTION INTRAMUSCULAR; INTRAVENOUS at 14:02

## 2023-08-29 RX ADMIN — MIDAZOLAM 2 MG: 1 INJECTION INTRAMUSCULAR; INTRAVENOUS at 13:59

## 2023-08-29 RX ADMIN — PROPOFOL 50 MG: 10 INJECTION, EMULSION INTRAVENOUS at 14:10

## 2023-08-29 RX ADMIN — PROPOFOL 75 MCG/KG/MIN: 10 INJECTION, EMULSION INTRAVENOUS at 14:03

## 2023-08-29 RX ADMIN — PROPOFOL 50 MG: 10 INJECTION, EMULSION INTRAVENOUS at 14:03

## 2023-08-29 RX ADMIN — FENTANYL CITRATE 25 MCG: 50 INJECTION, SOLUTION INTRAMUSCULAR; INTRAVENOUS at 14:25

## 2023-08-29 RX ADMIN — ONDANSETRON HYDROCHLORIDE 4 MG: 2 INJECTION, SOLUTION INTRAMUSCULAR; INTRAVENOUS at 14:15

## 2023-08-29 ASSESSMENT — PAIN SCALES - GENERAL
PAINLEVEL_OUTOF10: 0
PAINLEVEL_OUTOF10: 0

## 2023-08-29 NOTE — ANESTHESIA PRE PROCEDURE
ROS              GI/Hepatic/Renal: Neg GI/Hepatic/Renal ROS            Endo/Other: Negative Endo/Other ROS   (+) malignancy/cancer. Abdominal: normal exam            Vascular: negative vascular ROS. Other Findings:           Anesthesia Plan      general     ASA 2       Induction: intravenous. Anesthetic plan and risks discussed with patient.                         [address] [place] [address]   8/29/2023

## 2023-08-29 NOTE — DISCHARGE INSTRUCTIONS
Discharge Instructions from Dr. Cardoso Sons    I will call you with the pathology results, typically within 1 week from today. You may shower, but no hot tubs, swimming pools, or baths until your incision is healed. No heavy lifting with the affected extremity (nothing greater than 5 pounds), and limit its use for the next 4-5 days. You may use an ice pack for comfort for the next couple of days, but do not place ice directly on the skin. Rather, use a towel or clothing to serve as a barrier between skin and ice to prevent injury. If I placed a drain, follow the drain instructions provided, especially as you keep a record of the drain output. Follow medication instructions carefully. Watch for signs of infection as listed below. Redness  Swelling  Drainage from the incision or from your nipple that appears infected  Fever over 101 degrees for consecutive readings, or over 99.5 if you are currently undergoing chemotherapy. Call our office (number is below) for a follow-up appointment. If you have any problems, our phone number is [phone].

## 2023-08-29 NOTE — PERIOP NOTE
I have reviewed discharge instructions with the  spouse-Cedrick. The  spouse-Cedrick verbalized understanding. All questions addressed at this time. A paper copy of these instructions have been given to the patient to take home.

## 2023-08-30 NOTE — OP NOTE
[facility]  OPERATIVE REPORT    Name:  Lolita Cummings  MR#:  [id number]  :  1970  ACCOUNT #:  [de-identified]  DATE OF SERVICE:  2023    PREOPERATIVE DIAGNOSIS:  Carcinoma of the left breast with need for neoadjuvant chemotherapy. POSTOPERATIVE DIAGNOSIS:  Carcinoma of the left breast with need for neoadjuvant chemotherapy. PROCEDURE PERFORMED:  Insertion of venous Port-A-Cath for neoadjuvant chemotherapy and ultrasound-guided core biopsy of the left breast mass. SURGEON:  Maria T Hameed MD    ASSISTANT:  Bigg Pérez. ANESTHESIA:  Local standby. COMPLICATIONS:  None. SPECIMENS REMOVED:  Left breast tissue. IMPLANTS:  An 8-[place] PowerPort, right subclavian vein. ESTIMATED BLOOD LOSS:  Minimal.    INDICATIONS:  The patient is a 52-year-old female with above diagnosis. PROCEDURE:  After adequate IV sedation, sterile prep and drape, local anesthesia with 1% lidocaine mixed with 0.5% Marcaine, an ultrasound-guided core biopsy was taken of the left breast mass for research purposes. This was done for three separate cores and submitted to research nurses. A HydroMARK clip was placed at the biopsy site, and the wound was hemostatic and dressed with Dermabond. Attention was then turned to the right side where the right subclavian vein was cannulated on the first pass. Wire was passed into the superior vena cava and position confirmed on fluoroscopy. An anterior chest wall pocket was made and an 8-[place] PowerPort was tunneled from the chest wall pocket to the original stick site, cut to length using a combination of dilator and breakaway sheath. The catheter was placed in the superior vena cava and position confirmed on fluoroscopy. The catheter appeared slightly long, so it was disconnected at the port site and retracted about 2 cm and re-cut and attached securely to the port. Catheter aspirated again easily with saline and was flushed.   Fluoro was used to confirm position. The catheter was then flushed with heparin solution. This stick site was closed with a single U-stitch of 4-0 Monocryl.  skin was closed with an interrupted 3-0 Vicryl and a running subcuticular 4-0 Monocryl on skin. The patient tolerated the procedure well with no immediate complications. She was taken to the recovery room in stable condition.       Kenny Barone MD      [id number]  D:  08/29/2023 15:56  T:  08/30/2023 1:21  JOB #:  [id number]  CC:  MD Suha Laguerre MD

## 2023-09-01 ENCOUNTER — TELEPHONE (OUTPATIENT)
Age: 53
End: 2023-09-01

## 2023-09-01 ENCOUNTER — CLINICAL DOCUMENTATION (OUTPATIENT)
Age: 53
End: 2023-09-01

## 2023-09-01 NOTE — TELEPHONE ENCOUNTER
Benjamin called stating pt was approved for the injections at [address] [place] [address] would like to speak with someone about pt having the injections at [facility]. Request a call back.       CB# [phone]

## 2023-09-01 NOTE — PROGRESS NOTES
List of supportive medications provided to patient again per request (olanzapine, ondansetron, prochlorperazine, loratadine, acetaminophen and EMLA)    For Pharmacy Admin Tracking Only    Program: Medical Group  CPA in place:  Yes  Recommendation Provided To: Patient/Caregiver: 1 via In person  Intervention Detail: Adherence Monitorin  Intervention Accepted By: Patient/Caregiver: 1  Time Spent (min): 10

## 2023-09-06 ENCOUNTER — TELEPHONE (OUTPATIENT)
Age: 53
End: 2023-09-06

## 2023-09-06 ENCOUNTER — APPOINTMENT (OUTPATIENT)
Facility: HOSPITAL | Age: 53
End: 2023-09-06
Attending: INTERNAL MEDICINE
Payer: COMMERCIAL

## 2023-09-06 ENCOUNTER — HOSPITAL ENCOUNTER (OUTPATIENT)
Facility: HOSPITAL | Age: 53
Discharge: HOME | End: 2023-09-08
Attending: INTERNAL MEDICINE
Payer: COMMERCIAL

## 2023-09-06 VITALS
BODY MASS INDEX: 17.07 KG/M2 | WEIGHT: 100 LBS | DIASTOLIC BLOOD PRESSURE: 71 MMHG | HEIGHT: 64 IN | SYSTOLIC BLOOD PRESSURE: 102 MMHG | HEART RATE: 68 BPM

## 2023-09-06 DIAGNOSIS — Z51.81 ENCOUNTER FOR MONITORING CARDIOTOXIC DRUG THERAPY: ICD-10-CM

## 2023-09-06 DIAGNOSIS — C50.112 MALIGNANT NEOPLASM OF CENTRAL PORTION OF LEFT BREAST IN FEMALE, ESTROGEN RECEPTOR POSITIVE (HCC): ICD-10-CM

## 2023-09-06 DIAGNOSIS — Z17.0 MALIGNANT NEOPLASM OF CENTRAL PORTION OF LEFT BREAST IN FEMALE, ESTROGEN RECEPTOR POSITIVE (HCC): ICD-10-CM

## 2023-09-06 DIAGNOSIS — Z79.899 ENCOUNTER FOR MONITORING CARDIOTOXIC DRUG THERAPY: ICD-10-CM

## 2023-09-06 LAB
ECHO AO ASC DIAM: 2.6 CM
ECHO AO ASCENDING AORTA INDEX: 1.78 CM/M2
ECHO AV AREA PEAK VELOCITY: 2.2 CM2
ECHO AV AREA VTI: 2.1 CM2
ECHO AV AREA/BSA PEAK VELOCITY: 1.5 CM2/M2
ECHO AV AREA/BSA VTI: 1.4 CM2/M2
ECHO AV MEAN GRADIENT: 3 MMHG
ECHO AV MEAN VELOCITY: 0.8 M/S
ECHO AV PEAK GRADIENT: 6 MMHG
ECHO AV PEAK VELOCITY: 1.3 M/S
ECHO AV VELOCITY RATIO: 0.69
ECHO AV VTI: 28.8 CM
ECHO BSA: 1.43 M2
ECHO LA DIAMETER INDEX: 1.64 CM/M2
ECHO LA DIAMETER: 2.4 CM
ECHO LA VOL 2C: 18 ML (ref 22–52)
ECHO LA VOL 2C: 20 ML (ref 22–52)
ECHO LA VOL 4C: 19 ML (ref 22–52)
ECHO LA VOL 4C: 23 ML (ref 22–52)
ECHO LA VOL BP: 20 ML (ref 22–52)
ECHO LA VOL/BSA BIPLANE: 14 ML/M2 (ref 16–34)
ECHO LA VOLUME AREA LENGTH: 24 ML
ECHO LA VOLUME INDEX AREA LENGTH: 16 ML/M2 (ref 16–34)
ECHO LV E' LATERAL VELOCITY: 16 CM/S
ECHO LV E' SEPTAL VELOCITY: 12 CM/S
ECHO LV EDV A2C: 57 ML
ECHO LV EDV A4C: 60 ML
ECHO LV EDV BP: 60 ML (ref 56–104)
ECHO LV EDV INDEX A4C: 41 ML/M2
ECHO LV EDV INDEX BP: 41 ML/M2
ECHO LV EDV NDEX A2C: 39 ML/M2
ECHO LV EJECTION FRACTION A2C: 65 %
ECHO LV EJECTION FRACTION A4C: 64 %
ECHO LV EJECTION FRACTION BIPLANE: 64 % (ref 55–100)
ECHO LV ESV A2C: 20 ML
ECHO LV ESV A4C: 22 ML
ECHO LV ESV BP: 22 ML (ref 19–49)
ECHO LV ESV INDEX A2C: 14 ML/M2
ECHO LV ESV INDEX A4C: 15 ML/M2
ECHO LV ESV INDEX BP: 15 ML/M2
ECHO LV FRACTIONAL SHORTENING: 29 % (ref 28–44)
ECHO LV GLOBAL LONGITUDINAL STRAIN (GLS): -19.4 %
ECHO LV INTERNAL DIMENSION DIASTOLE INDEX: 2.6 CM/M2
ECHO LV INTERNAL DIMENSION DIASTOLIC: 3.8 CM (ref 3.9–5.3)
ECHO LV INTERNAL DIMENSION SYSTOLIC INDEX: 1.85 CM/M2
ECHO LV INTERNAL DIMENSION SYSTOLIC: 2.7 CM
ECHO LV IVSD: 0.8 CM (ref 0.6–0.9)
ECHO LV MASS 2D: 93.4 G (ref 67–162)
ECHO LV MASS INDEX 2D: 64 G/M2 (ref 43–95)
ECHO LV POSTERIOR WALL DIASTOLIC: 0.9 CM (ref 0.6–0.9)
ECHO LV RELATIVE WALL THICKNESS RATIO: 0.47
ECHO LVOT AREA: 3.1 CM2
ECHO LVOT AV VTI INDEX: 0.69
ECHO LVOT DIAM: 2 CM
ECHO LVOT MEAN GRADIENT: 1 MMHG
ECHO LVOT PEAK GRADIENT: 3 MMHG
ECHO LVOT PEAK VELOCITY: 0.9 M/S
ECHO LVOT STROKE VOLUME INDEX: 42.8 ML/M2
ECHO LVOT SV: 62.5 ML
ECHO LVOT VTI: 19.9 CM
ECHO MV A VELOCITY: 0.67 M/S
ECHO MV E DECELERATION TIME (DT): 156.4 MS
ECHO MV E VELOCITY: 0.65 M/S
ECHO MV E/A RATIO: 0.97
ECHO MV E/E' LATERAL: 4.06
ECHO MV E/E' RATIO (AVERAGED): 4.74
ECHO MV E/E' SEPTAL: 5.42
ECHO PV MAX VELOCITY: 0.7 M/S
ECHO PV PEAK GRADIENT: 2 MMHG
ECHO RV INTERNAL DIMENSION: 2.8 CM
ECHO RV TAPSE: 1.5 CM (ref 1.7–?)
ECHO RVOT PEAK GRADIENT: 2 MMHG
ECHO RVOT PEAK VELOCITY: 0.7 M/S

## 2023-09-06 PROCEDURE — 93306 TTE W/DOPPLER COMPLETE: CPT

## 2023-09-06 NOTE — TELEPHONE ENCOUNTER
PT called in with a few questions regarding the cold cap    - spray conditioner or regular conditioner, and leave on for the treatment?  - loss of hair, can she use root spray or a pencil to cover the patches?  - can she take vitamins daily? Biotin, multi, vitamin D and then fish oil every other day?     CB# [phone]

## 2023-09-08 ENCOUNTER — TELEPHONE (OUTPATIENT)
Age: 53
End: 2023-09-08

## 2023-09-08 NOTE — TELEPHONE ENCOUNTER
.[place] Rodolfo at [facility]  [phone]    09/08/23 3:06 PM EDT LVM req call back to discuss what specific results they are inquiring about

## 2023-09-08 NOTE — TELEPHONE ENCOUNTER
Patient called and stated she would like a call back with her biopsy results. Please advise.     # [phone]

## 2023-09-08 NOTE — TELEPHONE ENCOUNTER
significance(7); Benign(1); Likely benign(4). Notably, in [id number] [facility] classifies this finding as benign and [facility] classifies it as likely benign. About VUS findings:     A VUS is a change in a gene that we currently do not have enough information about to determine if the finding is associated with cancer risk     Not all variants present in a gene cause cancer. As a result, it is not recommended that VUS findings be used to inform clinical care. Personal medical history and family history should be used to guide health care management (including cancer screening or surveillance) in the setting of a clinically negative test result. The classification for VUS findings will sometimes change as we learn more about the variant. It possible that our interpretation of this VUS may change with time. Patients are encouraged to maintain contact and inquire (at least annually) with the laboratory and ordering provider for updates about a VUS finding. Also, patients are encouraged to register on the genetic laboratory portal to view their test results and learn about updates or re-classification of VUS results. Sometimes laboratories will offer free family variant testing for relatives to gather more data on a specific VUS finding and better understand the finding. This is primarily for contributing to research / for research purposes and usually does not result in an immediate reclassification of a VUS. Implications of a Clinically Negative Finding  Although test results are clinically negative, there could be a pathogenic variant that explains your personal history or family history and we have not yet detected this variant. It is also possible there is a different gene that we have not yet tested that may be responsible for the cancer in your history or your family history. Your risk to develop cancer(s) may change over time.  Share any new information about your family history

## 2023-09-11 ENCOUNTER — TELEPHONE (OUTPATIENT)
Age: 53
End: 2023-09-11

## 2023-09-11 NOTE — TELEPHONE ENCOUNTER
Patient called and stated that she would like a call back about a question she has about the research study.          # [phone]

## 2023-09-12 ENCOUNTER — RESEARCH ENCOUNTER (OUTPATIENT)
Age: 53
End: 2023-09-12

## 2023-09-12 DIAGNOSIS — Z51.11 ENCOUNTER FOR ANTINEOPLASTIC CHEMOTHERAPY: ICD-10-CM

## 2023-09-12 DIAGNOSIS — Z76.89 PREVENTION OF CHEMOTHERAPY-INDUCED NEUTROPENIA: ICD-10-CM

## 2023-09-12 NOTE — PROGRESS NOTES
Phone call to patient regarding participation in clinical trial FB-12. Patient canceled her CT chest this morning that's required for screening for trial.  Patient states she has decided that she no longer wishes to participate in trial.  Will inform Dr. Debbi Mo and team.  Confirmed patient's [facility] C1D1 9/13/23. No further questions.

## 2023-09-13 ENCOUNTER — OFFICE VISIT (OUTPATIENT)
Age: 53
End: 2023-09-13
Payer: COMMERCIAL

## 2023-09-13 VITALS
HEART RATE: 78 BPM | WEIGHT: 102 LBS | SYSTOLIC BLOOD PRESSURE: 112 MMHG | BODY MASS INDEX: 17.51 KG/M2 | OXYGEN SATURATION: 98 % | DIASTOLIC BLOOD PRESSURE: 70 MMHG | RESPIRATION RATE: 16 BRPM | TEMPERATURE: 97.9 F

## 2023-09-13 DIAGNOSIS — C50.112 MALIGNANT NEOPLASM OF CENTRAL PORTION OF LEFT BREAST IN FEMALE, ESTROGEN RECEPTOR POSITIVE (HCC): Primary | ICD-10-CM

## 2023-09-13 DIAGNOSIS — Z17.0 MALIGNANT NEOPLASM OF CENTRAL PORTION OF LEFT BREAST IN FEMALE, ESTROGEN RECEPTOR POSITIVE (HCC): Primary | ICD-10-CM

## 2023-09-13 PROCEDURE — 99215 OFFICE O/P EST HI 40 MIN: CPT | Performed by: INTERNAL MEDICINE

## 2023-09-13 NOTE — PROGRESS NOTES
Ying Keith is a 47 y.o. female here today to follow up for breast cancer     1. Have you been to the ER, urgent care clinic since your last visit? Hospitalized since your last visit?no    2. Have you seen or consulted any other health care providers outside of the [address] [place] [address] since your last visit? Include any pap smears or colon screening.  no
clinical criteria (using Adjuvant! Online) and by the [facility]. Patients with discordant clinical and genomic predictions were randomly assigned to receive or not receive adjuvant chemotherapy. Among patients in the intention-to-treat population who had a high clinical risk of recurrence but a low risk by [place] genetic profile, the five-year distant metastases-free survival rate was 95.9 percent with chemotherapy and 94.4 percent without chemotherapy (HR for distant metastasis or death 0.78, 95% CI 0.50-1.21). However, it should be noted that the MINDACT study was not powered to exclude a benefit of chemotherapy. In analysis of discordant groups in the intention-to-treat population, adjuvant chemotherapy was associated with improvement in the rate of distant metastasis-free survival of 2.8 and 2 percent, respectively, for the high clinical/low genomic and low clinical/high genomic risk groups, although these differences were also not statistically significant. Based on MINDACT, ASCO has suggested Margarito Padronn is one of several assays that might be used to determine prognosis for those with high clinical risk, hormone receptor-positive, HER2-negative breast cancer and no or limited (one to three) involved lymph nodes to inform decisions regarding withholding chemotherapy. High risk mammaprint and high clinical risk, a discussion of chemotherapy is warranted    I discussed the potential risks of dose-dense chemotherapy with the patient. (DD AC-T, adriamycin 60 mg/m2; cyclophosphamide 600 mg/m2 q 2 weeks x 4; paclitaxel 80 mg/m2 qweekly x 12). Major toxicities include nausea and vomiting, stomatitis, fatigue, and a small risk of heart damage. Anemia frequently results and occasionally requires transfusions. Neutropenic fever is uncommon, but can be a life-threatening problem. Also, there is a small but increased risk of myelodysplasia and acute leukemia.  We provided the patient with detailed

## 2023-09-18 ENCOUNTER — HOSPITAL ENCOUNTER (OUTPATIENT)
Facility: HOSPITAL | Age: 53
Discharge: HOME | End: 2023-09-21
Attending: INTERNAL MEDICINE
Payer: COMMERCIAL

## 2023-09-18 DIAGNOSIS — Z51.11 ENCOUNTER FOR ANTINEOPLASTIC CHEMOTHERAPY: ICD-10-CM

## 2023-09-18 DIAGNOSIS — Z17.0 MALIGNANT NEOPLASM OF CENTRAL PORTION OF LEFT BREAST IN FEMALE, ESTROGEN RECEPTOR POSITIVE (HCC): ICD-10-CM

## 2023-09-18 DIAGNOSIS — C50.112 MALIGNANT NEOPLASM OF CENTRAL PORTION OF LEFT BREAST IN FEMALE, ESTROGEN RECEPTOR POSITIVE (HCC): ICD-10-CM

## 2023-09-18 PROCEDURE — 71260 CT THORAX DX C+: CPT

## 2023-09-18 PROCEDURE — 6360000004 HC RX CONTRAST MEDICATION: Performed by: INTERNAL MEDICINE

## 2023-09-18 RX ADMIN — IOPAMIDOL 100 ML: 755 INJECTION, SOLUTION INTRAVENOUS at 16:25

## 2023-09-19 ENCOUNTER — TELEPHONE (OUTPATIENT)
Age: 53
End: 2023-09-19

## 2023-09-19 NOTE — TELEPHONE ENCOUNTER
Rogelio Reynolds at [facility]  [phone]    09/19/23 11:18 AM EDT - Called patient to let her know the results of her CT scan. Informed her that overall it looked good but there was a tiny lung nodule. Advised that Dr. Mylene Shah is not worried about it at this time. Advised that we will continue to monitor it by doing follow up imaging in 6 months. I explained that it is too small to biopsy. I reassured her that Dr. Mylene Shah is not worried about the nodule. Advised that she is still able to start the study tomorrow. We went over her appointments for tomorrow. Patient had no further questions at this time.

## 2023-09-20 ENCOUNTER — RESEARCH ENCOUNTER (OUTPATIENT)
Age: 53
End: 2023-09-20

## 2023-09-20 ENCOUNTER — HOSPITAL ENCOUNTER (OUTPATIENT)
Facility: HOSPITAL | Age: 53
Setting detail: INFUSION SERIES
End: 2023-09-20
Payer: COMMERCIAL

## 2023-09-20 ENCOUNTER — OFFICE VISIT (OUTPATIENT)
Age: 53
End: 2023-09-20

## 2023-09-20 VITALS
BODY MASS INDEX: 17.16 KG/M2 | RESPIRATION RATE: 16 BRPM | DIASTOLIC BLOOD PRESSURE: 70 MMHG | TEMPERATURE: 97.8 F | WEIGHT: 100 LBS | OXYGEN SATURATION: 98 % | HEART RATE: 68 BPM | SYSTOLIC BLOOD PRESSURE: 98 MMHG

## 2023-09-20 VITALS
RESPIRATION RATE: 16 BRPM | HEART RATE: 74 BPM | BODY MASS INDEX: 17.14 KG/M2 | DIASTOLIC BLOOD PRESSURE: 66 MMHG | WEIGHT: 100.4 LBS | TEMPERATURE: 97.8 F | OXYGEN SATURATION: 98 % | HEIGHT: 64 IN | SYSTOLIC BLOOD PRESSURE: 97 MMHG

## 2023-09-20 DIAGNOSIS — C50.112 MALIGNANT NEOPLASM OF CENTRAL PORTION OF LEFT BREAST IN FEMALE, ESTROGEN RECEPTOR POSITIVE (HCC): Primary | ICD-10-CM

## 2023-09-20 DIAGNOSIS — Z17.0 MALIGNANT NEOPLASM OF CENTRAL PORTION OF LEFT BREAST IN FEMALE, ESTROGEN RECEPTOR POSITIVE (HCC): Primary | ICD-10-CM

## 2023-09-20 DIAGNOSIS — C50.912 INVASIVE DUCTAL CARCINOMA OF BREAST, FEMALE, LEFT (HCC): ICD-10-CM

## 2023-09-20 DIAGNOSIS — Z51.11 ENCOUNTER FOR ANTINEOPLASTIC CHEMOTHERAPY: ICD-10-CM

## 2023-09-20 DIAGNOSIS — Z51.11 ENCOUNTER FOR ANTINEOPLASTIC CHEMOTHERAPY: Primary | ICD-10-CM

## 2023-09-20 DIAGNOSIS — Z76.89 PREVENTION OF CHEMOTHERAPY-INDUCED NEUTROPENIA: ICD-10-CM

## 2023-09-20 LAB
ALBUMIN SERPL-MCNC: 3.8 G/DL (ref 3.5–5)
ALBUMIN/GLOB SERPL: 1.2 (ref 1.1–2.2)
ALP SERPL-CCNC: 47 U/L (ref 45–117)
ALT SERPL-CCNC: 24 U/L (ref 12–78)
ANION GAP SERPL CALC-SCNC: 4 MMOL/L (ref 5–15)
AST SERPL-CCNC: 22 U/L (ref 15–37)
BASOPHILS # BLD: 0 K/UL (ref 0–0.1)
BASOPHILS NFR BLD: 1 % (ref 0–1)
BILIRUB SERPL-MCNC: 0.9 MG/DL (ref 0.2–1)
BUN SERPL-MCNC: 15 MG/DL (ref 6–20)
BUN/CREAT SERPL: 20 (ref 12–20)
CALCIUM SERPL-MCNC: 8.9 MG/DL (ref 8.5–10.1)
CHLORIDE SERPL-SCNC: 105 MMOL/L (ref 97–108)
CO2 SERPL-SCNC: 28 MMOL/L (ref 21–32)
CREAT SERPL-MCNC: 0.75 MG/DL (ref 0.55–1.02)
DIFFERENTIAL METHOD BLD: ABNORMAL
EOSINOPHIL # BLD: 0.1 K/UL (ref 0–0.4)
EOSINOPHIL NFR BLD: 2 % (ref 0–7)
ERYTHROCYTE [DISTWIDTH] IN BLOOD BY AUTOMATED COUNT: 12.1 % (ref 11.5–14.5)
GLOBULIN SER CALC-MCNC: 3.2 G/DL (ref 2–4)
GLUCOSE SERPL-MCNC: 82 MG/DL (ref 65–100)
HCG SERPL QL: NEGATIVE
HCT VFR BLD AUTO: 37.7 % (ref 35–47)
HGB BLD-MCNC: 12.9 G/DL (ref 11.5–16)
IMM GRANULOCYTES # BLD AUTO: 0 K/UL (ref 0–0.04)
IMM GRANULOCYTES NFR BLD AUTO: 0 % (ref 0–0.5)
LYMPHOCYTES # BLD: 1.6 K/UL (ref 0.8–3.5)
LYMPHOCYTES NFR BLD: 38 % (ref 12–49)
MCH RBC QN AUTO: 30.3 PG (ref 26–34)
MCHC RBC AUTO-ENTMCNC: 34.2 G/DL (ref 30–36.5)
MCV RBC AUTO: 88.5 FL (ref 80–99)
MONOCYTES # BLD: 0.6 K/UL (ref 0–1)
MONOCYTES NFR BLD: 14 % (ref 5–13)
NEUTS SEG # BLD: 1.9 K/UL (ref 1.8–8)
NEUTS SEG NFR BLD: 45 % (ref 32–75)
NRBC # BLD: 0 K/UL (ref 0–0.01)
NRBC BLD-RTO: 0 PER 100 WBC
PLATELET # BLD AUTO: 191 K/UL (ref 150–400)
PMV BLD AUTO: 9.4 FL (ref 8.9–12.9)
POTASSIUM SERPL-SCNC: 3.8 MMOL/L (ref 3.5–5.1)
PROT SERPL-MCNC: 7 G/DL (ref 6.4–8.2)
RBC # BLD AUTO: 4.26 M/UL (ref 3.8–5.2)
SODIUM SERPL-SCNC: 137 MMOL/L (ref 136–145)
TROPONIN I SERPL HS-MCNC: 4 NG/L (ref 0–51)
TROPONIN I SERPL HS-MCNC: 4 NG/L (ref 0–51)
WBC # BLD AUTO: 4.1 K/UL (ref 3.6–11)

## 2023-09-20 PROCEDURE — 96377 APPLICATON ON-BODY INJECTOR: CPT

## 2023-09-20 PROCEDURE — 96413 CHEMO IV INFUSION 1 HR: CPT

## 2023-09-20 PROCEDURE — 6360000002 HC RX W HCPCS: Performed by: INTERNAL MEDICINE

## 2023-09-20 PROCEDURE — 85025 COMPLETE CBC W/AUTO DIFF WBC: CPT

## 2023-09-20 PROCEDURE — 84703 CHORIONIC GONADOTROPIN ASSAY: CPT

## 2023-09-20 PROCEDURE — 96375 TX/PRO/DX INJ NEW DRUG ADDON: CPT

## 2023-09-20 PROCEDURE — 80053 COMPREHEN METABOLIC PANEL: CPT

## 2023-09-20 PROCEDURE — 96411 CHEMO IV PUSH ADDL DRUG: CPT

## 2023-09-20 PROCEDURE — 84484 ASSAY OF TROPONIN QUANT: CPT

## 2023-09-20 PROCEDURE — 96367 TX/PROPH/DG ADDL SEQ IV INF: CPT

## 2023-09-20 PROCEDURE — 2580000003 HC RX 258: Performed by: INTERNAL MEDICINE

## 2023-09-20 RX ORDER — ONDANSETRON 2 MG/ML
8 INJECTION INTRAMUSCULAR; INTRAVENOUS
Status: CANCELLED | OUTPATIENT
Start: 2023-09-20

## 2023-09-20 RX ORDER — DOXORUBICIN HYDROCHLORIDE 2 MG/ML
60 INJECTION, SOLUTION INTRAVENOUS ONCE
Status: COMPLETED | OUTPATIENT
Start: 2023-09-20 | End: 2023-09-20

## 2023-09-20 RX ORDER — DIPHENHYDRAMINE HYDROCHLORIDE 50 MG/ML
50 INJECTION INTRAMUSCULAR; INTRAVENOUS
Status: CANCELLED | OUTPATIENT
Start: 2023-09-20

## 2023-09-20 RX ORDER — DIPHENHYDRAMINE HYDROCHLORIDE 50 MG/ML
50 INJECTION INTRAMUSCULAR; INTRAVENOUS
Status: DISCONTINUED | OUTPATIENT
Start: 2023-09-20 | End: 2023-09-21 | Stop reason: HOSPADM

## 2023-09-20 RX ORDER — HEPARIN SODIUM (PORCINE) LOCK FLUSH IV SOLN 100 UNIT/ML 100 UNIT/ML
500 SOLUTION INTRAVENOUS PRN
Status: CANCELLED | OUTPATIENT
Start: 2023-09-20

## 2023-09-20 RX ORDER — ACETAMINOPHEN 325 MG/1
650 TABLET ORAL
Status: DISCONTINUED | OUTPATIENT
Start: 2023-09-20 | End: 2023-09-21 | Stop reason: HOSPADM

## 2023-09-20 RX ORDER — EPINEPHRINE 1 MG/ML
0.3 INJECTION, SOLUTION, CONCENTRATE INTRAVENOUS PRN
Status: CANCELLED | OUTPATIENT
Start: 2023-09-20

## 2023-09-20 RX ORDER — SODIUM CHLORIDE 9 MG/ML
5-250 INJECTION, SOLUTION INTRAVENOUS PRN
Status: CANCELLED | OUTPATIENT
Start: 2023-09-20

## 2023-09-20 RX ORDER — SODIUM CHLORIDE 0.9 % (FLUSH) 0.9 %
5-40 SYRINGE (ML) INJECTION PRN
Status: DISCONTINUED | OUTPATIENT
Start: 2023-09-20 | End: 2023-09-21 | Stop reason: HOSPADM

## 2023-09-20 RX ORDER — PALONOSETRON 0.05 MG/ML
0.25 INJECTION, SOLUTION INTRAVENOUS ONCE
Status: CANCELLED | OUTPATIENT
Start: 2023-09-20 | End: 2023-09-20

## 2023-09-20 RX ORDER — ALBUTEROL SULFATE 90 UG/1
4 AEROSOL, METERED RESPIRATORY (INHALATION) PRN
Status: CANCELLED | OUTPATIENT
Start: 2023-09-20

## 2023-09-20 RX ORDER — EPINEPHRINE 1 MG/ML
0.3 INJECTION, SOLUTION, CONCENTRATE INTRAVENOUS PRN
Status: DISCONTINUED | OUTPATIENT
Start: 2023-09-20 | End: 2023-10-27 | Stop reason: HOSPADM

## 2023-09-20 RX ORDER — SODIUM CHLORIDE 9 MG/ML
INJECTION, SOLUTION INTRAVENOUS CONTINUOUS
Status: CANCELLED | OUTPATIENT
Start: 2023-09-20

## 2023-09-20 RX ORDER — SODIUM CHLORIDE 9 MG/ML
INJECTION, SOLUTION INTRAVENOUS CONTINUOUS
Status: DISCONTINUED | OUTPATIENT
Start: 2023-09-20 | End: 2023-09-21 | Stop reason: HOSPADM

## 2023-09-20 RX ORDER — FAMOTIDINE 10 MG/ML
20 INJECTION, SOLUTION INTRAVENOUS
Status: CANCELLED | OUTPATIENT
Start: 2023-09-20

## 2023-09-20 RX ORDER — ALBUTEROL SULFATE 90 UG/1
4 AEROSOL, METERED RESPIRATORY (INHALATION) PRN
Status: DISCONTINUED | OUTPATIENT
Start: 2023-09-20 | End: 2023-09-21 | Stop reason: HOSPADM

## 2023-09-20 RX ORDER — DOXORUBICIN HYDROCHLORIDE 2 MG/ML
60 INJECTION, SOLUTION INTRAVENOUS ONCE
Status: CANCELLED | OUTPATIENT
Start: 2023-09-20

## 2023-09-20 RX ORDER — MEPERIDINE HYDROCHLORIDE 25 MG/ML
12.5 INJECTION INTRAMUSCULAR; INTRAVENOUS; SUBCUTANEOUS PRN
Status: DISCONTINUED | OUTPATIENT
Start: 2023-09-20 | End: 2023-10-27 | Stop reason: HOSPADM

## 2023-09-20 RX ORDER — HEPARIN 100 UNIT/ML
500 SYRINGE INTRAVENOUS PRN
Status: DISCONTINUED | OUTPATIENT
Start: 2023-09-20 | End: 2023-09-21 | Stop reason: HOSPADM

## 2023-09-20 RX ORDER — MEPERIDINE HYDROCHLORIDE 50 MG/ML
12.5 INJECTION INTRAMUSCULAR; INTRAVENOUS; SUBCUTANEOUS PRN
Status: CANCELLED | OUTPATIENT
Start: 2023-09-20

## 2023-09-20 RX ORDER — PALONOSETRON 0.05 MG/ML
0.25 INJECTION, SOLUTION INTRAVENOUS ONCE
Status: COMPLETED | OUTPATIENT
Start: 2023-09-20 | End: 2023-09-20

## 2023-09-20 RX ORDER — ACETAMINOPHEN 325 MG/1
650 TABLET ORAL
Status: CANCELLED | OUTPATIENT
Start: 2023-09-20

## 2023-09-20 RX ORDER — SODIUM CHLORIDE 9 MG/ML
5-250 INJECTION, SOLUTION INTRAVENOUS PRN
Status: DISCONTINUED | OUTPATIENT
Start: 2023-09-20 | End: 2023-09-21 | Stop reason: HOSPADM

## 2023-09-20 RX ORDER — SODIUM CHLORIDE 0.9 % (FLUSH) 0.9 %
5-40 SYRINGE (ML) INJECTION PRN
Status: CANCELLED | OUTPATIENT
Start: 2023-09-20

## 2023-09-20 RX ORDER — ONDANSETRON 2 MG/ML
8 INJECTION INTRAMUSCULAR; INTRAVENOUS
Status: DISCONTINUED | OUTPATIENT
Start: 2023-09-20 | End: 2023-09-21 | Stop reason: HOSPADM

## 2023-09-20 RX ADMIN — SODIUM CHLORIDE, PRESERVATIVE FREE 30 ML: 5 INJECTION INTRAVENOUS at 15:14

## 2023-09-20 RX ADMIN — DEXAMETHASONE SODIUM PHOSPHATE 12 MG: 4 INJECTION, SOLUTION INTRA-ARTICULAR; INTRALESIONAL; INTRAMUSCULAR; INTRAVENOUS; SOFT TISSUE at 11:20

## 2023-09-20 RX ADMIN — DOXORUBICIN HYDROCHLORIDE 88 MG: 2 INJECTION, SOLUTION INTRAVENOUS at 13:21

## 2023-09-20 RX ADMIN — FOSAPREPITANT 150 MG: 150 INJECTION, POWDER, LYOPHILIZED, FOR SOLUTION INTRAVENOUS at 11:41

## 2023-09-20 RX ADMIN — PALONOSETRON HYDROCHLORIDE 0.25 MG: 0.25 INJECTION INTRAVENOUS at 11:14

## 2023-09-20 RX ADMIN — SODIUM CHLORIDE 25 ML/HR: 9 INJECTION, SOLUTION INTRAVENOUS at 11:14

## 2023-09-20 RX ADMIN — PEGFILGRASTIM 6 MG: KIT SUBCUTANEOUS at 15:30

## 2023-09-20 RX ADMIN — CYCLOPHOSPHAMIDE 880 MG: 200 INJECTION, SOLUTION INTRAVENOUS at 13:42

## 2023-09-20 ASSESSMENT — PAIN SCALES - GENERAL: PAINLEVEL_OUTOF10: 0

## 2023-09-20 NOTE — PROGRESS NOTES
Og Oh is a 47 y.o. female here today to follow up for breast cancer    1. Have you been to the ER, urgent care clinic since your last visit? Hospitalized since your last visit?no    2. Have you seen or consulted any other health care providers outside of the [address] [place] [address] since your last visit? Include any pap smears or colon screening.  no
mg/m2 qweekly x 12). Major toxicities include nausea and vomiting, stomatitis, fatigue, and a small risk of heart damage. Anemia frequently results and occasionally requires transfusions. Neutropenic fever is uncommon, but can be a life-threatening problem. Also, there is a small but increased risk of myelodysplasia and acute leukemia. We provided the patient with detailed information concerning toxicity including frequent toxicities that only last a few days, such as nausea, vomiting, mouth sores, arthralgia, myalgia, and potentially allergic reactions to paclitaxel, as well as toxicities which can be longer lasting including total alopecia, fatigue, anemia and neuropathy. We provided the patient with detailed information concerning the toxicities of their regimen in addition to our verbal discussion. Also discussed the clinical trial FB-12, which would test for abnormal HER 2 signaling by [employer] testing. If the tumor has abnormal signaling, then she would receive trastuzumab and pertuzumab q 3 weeks during the 12 weeks of paclitaxel. She consented to the research biopsy, her tumor does have abnormal signaling. She DOES want to participate in the study, she has obtained CT Chest 9/19/23. The side effects of trastuzumab were discussed including a 4%-5% risk of dropping  her ejection fraction while on treatment and about a 1% risk of CHF. Additional AE with pertuzumab discussed including an acne rash (and the use of doxycyline 100 mg bid to help prevent) and diarrhea and consideration of additional cardiomyopathy. She is agreeable to DD [facility] then weekly T as above. She has signed informed consent. Labs reviewed, ok to proceed with [facility] #1 today    Cold caps discussed, discussed not working as well with AC. Discussed risks of COVID19 and precautions to take.     Discussed oral and peripheral cryotherapy, she has enrolled in neuropathy study    The patient was given the following prescriptions
IV Lambert Rower will be used with AC. Neulasta the following day (bone pain side effect discussed) with AC. We will plan to see the patient in follow up at least once per cycle, or sooner if symptoms warrant. Labs with each cycle for intensive monitoring for toxicity    TTE 9/6/23 EF 69%    Dr. Katty Zarco placed port on 8/29/23    2. Emotional well being/recurrent depression:  She has excellent support and is coping well with her disease; she has increased her zoloft to 50 mg daily; also taking clonazepam.    3. PALB2 VUS:  not clinically relevant at this time    4. Pulmonary nodule: 3mm nodule to RUL seen on CT 9/19/23. Will repeat CT Chest after completion of chemotherapy. I appreciate the opportunity to participate in Ms. [facility]. Signed By: Dennie Reed, APRN - NP      No follow-ups on file.

## 2023-09-20 NOTE — PROGRESS NOTES
Patient in today for labs and follow-up visit with Dr. Geovany Burns. Today is C1D1 on study FB-12. Patient reports the following adverse events: gr 1 loss of appetite, gr 1 nausea, gr 1 insomnia. Vital signs are stable. Patient to receive AC. Next appt is 10/4/2023.

## 2023-09-20 NOTE — PROGRESS NOTES
Pt has been screened for all eligibility/ineligibility criteria and has been determined eligible for this protocol. Informed consent was reviewed by RN with patient prior to signing. Patient was informed that participation is voluntary and she has the right to withdraw at anytime without prejudice. The patient has been deemed of adequate mental and emotional capacity to give an informed consent per Dr. Beth Cochran. Possible side effects were discussed in detail, with patient being advised to inform RN or Dr. Beth Cochran immediately in the event of any side effects once drug is started or at any time should she have any questions. All questions were answered adequately by RN or Dr. Beth Cochran. Patient signed consent today for study FB-12 (main consent). The patient was accompanied by her . A copy of ICF given to patient. No additional questions at this time.

## 2023-09-25 ENCOUNTER — TELEPHONE (OUTPATIENT)
Age: 53
End: 2023-09-25

## 2023-09-25 NOTE — PROGRESS NOTES
Verified LMP with patient. She states she had one 8/15/2023. HCG serum- negative prior to [facility] chemo given. Patient also agrees to abstinence as method of birth control during therapy, and for at least 7 months after last dose of study therapy.

## 2023-09-25 NOTE — TELEPHONE ENCOUNTER
Pt called in stating she had her first chemo last week and she now has a sensitive area and wanted to discuss it with the team    CB# [phone]

## 2023-09-25 NOTE — TELEPHONE ENCOUNTER
Rogelio Reynolds at [facility]  [phone]    09/25/23 11:35 AM EDT - Called patient back to discuss her symptoms. Patient stated that she was having some left breast tenderness and wanted to make sure that was normal after chemotherapy. Advised patient that is was an inflammatory response to the chemotherapy which is normal. Patient is currently taking regular strength Tylenol. Advised patient to increase to extra strength and to avoid taking more than 4000 mg in a day. Explained that Claritin was used for bone pain instead of muscle or nerve pain. Advised patient that she can put ice on the affected area to help with the pain. Patient had no further questions at this time.

## 2023-09-27 ENCOUNTER — TELEPHONE (OUTPATIENT)
Age: 53
End: 2023-09-27

## 2023-09-27 ENCOUNTER — APPOINTMENT (OUTPATIENT)
Facility: HOSPITAL | Age: 53
End: 2023-09-27
Payer: COMMERCIAL

## 2023-09-28 ENCOUNTER — TELEPHONE (OUTPATIENT)
Age: 53
End: 2023-09-28

## 2023-09-28 DIAGNOSIS — C50.112 MALIGNANT NEOPLASM OF CENTRAL PORTION OF LEFT BREAST IN FEMALE, ESTROGEN RECEPTOR POSITIVE (HCC): Primary | ICD-10-CM

## 2023-09-28 DIAGNOSIS — L65.8 ALOPECIA DUE TO CYTOTOXIC DRUG: ICD-10-CM

## 2023-09-28 DIAGNOSIS — T45.1X5A ALOPECIA DUE TO CYTOTOXIC DRUG: ICD-10-CM

## 2023-09-28 DIAGNOSIS — Z17.0 MALIGNANT NEOPLASM OF CENTRAL PORTION OF LEFT BREAST IN FEMALE, ESTROGEN RECEPTOR POSITIVE (HCC): Primary | ICD-10-CM

## 2023-09-28 RX ORDER — PALONOSETRON 0.05 MG/ML
0.25 INJECTION, SOLUTION INTRAVENOUS ONCE
Status: CANCELLED | OUTPATIENT
Start: 2023-10-18 | End: 2023-10-18

## 2023-09-28 RX ORDER — EPINEPHRINE 1 MG/ML
0.3 INJECTION, SOLUTION, CONCENTRATE INTRAVENOUS PRN
Status: CANCELLED | OUTPATIENT
Start: 2023-10-18

## 2023-09-28 RX ORDER — SODIUM CHLORIDE 9 MG/ML
5-250 INJECTION, SOLUTION INTRAVENOUS PRN
OUTPATIENT
Start: 2023-11-01

## 2023-09-28 RX ORDER — SODIUM CHLORIDE 9 MG/ML
5-250 INJECTION, SOLUTION INTRAVENOUS PRN
Status: CANCELLED | OUTPATIENT
Start: 2023-10-04

## 2023-09-28 RX ORDER — ONDANSETRON 2 MG/ML
8 INJECTION INTRAMUSCULAR; INTRAVENOUS
Status: CANCELLED | OUTPATIENT
Start: 2023-10-04

## 2023-09-28 RX ORDER — MEPERIDINE HYDROCHLORIDE 25 MG/ML
12.5 INJECTION INTRAMUSCULAR; INTRAVENOUS; SUBCUTANEOUS PRN
Status: CANCELLED | OUTPATIENT
Start: 2023-10-04

## 2023-09-28 RX ORDER — MEPERIDINE HYDROCHLORIDE 25 MG/ML
12.5 INJECTION INTRAMUSCULAR; INTRAVENOUS; SUBCUTANEOUS PRN
Status: CANCELLED | OUTPATIENT
Start: 2023-10-18

## 2023-09-28 RX ORDER — EPINEPHRINE 1 MG/ML
0.3 INJECTION, SOLUTION, CONCENTRATE INTRAVENOUS PRN
Status: CANCELLED | OUTPATIENT
Start: 2023-10-04

## 2023-09-28 RX ORDER — SODIUM CHLORIDE 9 MG/ML
INJECTION, SOLUTION INTRAVENOUS CONTINUOUS
Status: CANCELLED | OUTPATIENT
Start: 2023-10-04

## 2023-09-28 RX ORDER — EPINEPHRINE 1 MG/ML
0.3 INJECTION, SOLUTION, CONCENTRATE INTRAVENOUS PRN
OUTPATIENT
Start: 2023-11-01

## 2023-09-28 RX ORDER — HEPARIN 100 UNIT/ML
500 SYRINGE INTRAVENOUS PRN
OUTPATIENT
Start: 2023-11-01

## 2023-09-28 RX ORDER — ACETAMINOPHEN 325 MG/1
650 TABLET ORAL
OUTPATIENT
Start: 2023-11-01

## 2023-09-28 RX ORDER — MEPERIDINE HYDROCHLORIDE 25 MG/ML
12.5 INJECTION INTRAMUSCULAR; INTRAVENOUS; SUBCUTANEOUS PRN
OUTPATIENT
Start: 2023-11-01

## 2023-09-28 RX ORDER — ONDANSETRON 2 MG/ML
8 INJECTION INTRAMUSCULAR; INTRAVENOUS
Status: CANCELLED | OUTPATIENT
Start: 2023-10-18

## 2023-09-28 RX ORDER — ONDANSETRON 2 MG/ML
8 INJECTION INTRAMUSCULAR; INTRAVENOUS
OUTPATIENT
Start: 2023-11-01

## 2023-09-28 RX ORDER — ALBUTEROL SULFATE 90 UG/1
4 AEROSOL, METERED RESPIRATORY (INHALATION) PRN
Status: CANCELLED | OUTPATIENT
Start: 2023-10-18

## 2023-09-28 RX ORDER — SODIUM CHLORIDE 9 MG/ML
INJECTION, SOLUTION INTRAVENOUS CONTINUOUS
OUTPATIENT
Start: 2023-11-01

## 2023-09-28 RX ORDER — SODIUM CHLORIDE 9 MG/ML
INJECTION, SOLUTION INTRAVENOUS CONTINUOUS
Status: CANCELLED | OUTPATIENT
Start: 2023-10-18

## 2023-09-28 RX ORDER — HEPARIN 100 UNIT/ML
500 SYRINGE INTRAVENOUS PRN
Status: CANCELLED | OUTPATIENT
Start: 2023-10-18

## 2023-09-28 RX ORDER — SODIUM CHLORIDE 0.9 % (FLUSH) 0.9 %
5-40 SYRINGE (ML) INJECTION PRN
Status: CANCELLED | OUTPATIENT
Start: 2023-10-18

## 2023-09-28 RX ORDER — DIPHENHYDRAMINE HYDROCHLORIDE 50 MG/ML
50 INJECTION INTRAMUSCULAR; INTRAVENOUS
Status: CANCELLED | OUTPATIENT
Start: 2023-10-18

## 2023-09-28 RX ORDER — SODIUM CHLORIDE 9 MG/ML
5-250 INJECTION, SOLUTION INTRAVENOUS PRN
Status: CANCELLED | OUTPATIENT
Start: 2023-10-18

## 2023-09-28 RX ORDER — DIPHENHYDRAMINE HYDROCHLORIDE 50 MG/ML
50 INJECTION INTRAMUSCULAR; INTRAVENOUS
Status: CANCELLED | OUTPATIENT
Start: 2023-10-04

## 2023-09-28 RX ORDER — PALONOSETRON 0.05 MG/ML
0.25 INJECTION, SOLUTION INTRAVENOUS ONCE
OUTPATIENT
Start: 2023-11-01 | End: 2023-11-01

## 2023-09-28 RX ORDER — ALBUTEROL SULFATE 90 UG/1
4 AEROSOL, METERED RESPIRATORY (INHALATION) PRN
OUTPATIENT
Start: 2023-11-01

## 2023-09-28 RX ORDER — HEPARIN 100 UNIT/ML
500 SYRINGE INTRAVENOUS PRN
Status: CANCELLED | OUTPATIENT
Start: 2023-10-04

## 2023-09-28 RX ORDER — ALBUTEROL SULFATE 90 UG/1
4 AEROSOL, METERED RESPIRATORY (INHALATION) PRN
Status: CANCELLED | OUTPATIENT
Start: 2023-10-04

## 2023-09-28 RX ORDER — SODIUM CHLORIDE 0.9 % (FLUSH) 0.9 %
5-40 SYRINGE (ML) INJECTION PRN
OUTPATIENT
Start: 2023-11-01

## 2023-09-28 RX ORDER — DOXORUBICIN HYDROCHLORIDE 2 MG/ML
60 INJECTION, SOLUTION INTRAVENOUS ONCE
Status: CANCELLED | OUTPATIENT
Start: 2023-10-18

## 2023-09-28 RX ORDER — ACETAMINOPHEN 325 MG/1
650 TABLET ORAL
Status: CANCELLED | OUTPATIENT
Start: 2023-10-18

## 2023-09-28 RX ORDER — DOXORUBICIN HYDROCHLORIDE 2 MG/ML
60 INJECTION, SOLUTION INTRAVENOUS ONCE
OUTPATIENT
Start: 2023-11-01

## 2023-09-28 RX ORDER — ACETAMINOPHEN 325 MG/1
650 TABLET ORAL
Status: CANCELLED | OUTPATIENT
Start: 2023-10-04

## 2023-09-28 RX ORDER — DIPHENHYDRAMINE HYDROCHLORIDE 50 MG/ML
50 INJECTION INTRAMUSCULAR; INTRAVENOUS
OUTPATIENT
Start: 2023-11-01

## 2023-09-28 NOTE — TELEPHONE ENCOUNTER
Called patient back at her request. Discussed/explained billing process for clarita/césar. She received EOB from insurance stating it's denied. I told her to disregard this, not a bill, and they will work with her insurance to get approved. She will reach back out to [employer] about the patient assistance form that they never sent her. I told her the price they quote her at is all she will be responsible for. I told her to let us know if there are any issues with César and I can get Blanquita involved. Also discussed her treatment, reassured her. I told her usually patients come back mid point for imaging to eval response. She will discuss with Dr. Charis Ingram. She was very appreciative of discussion and will let me know if she has any other questions/concerns.

## 2023-09-28 NOTE — TELEPHONE ENCOUNTER
Cancer [place]holz at [facility]  [address] [facility], [address] [place] [address]  W: [phone]  F: [phone]  Medical Nutrition Therapy  Nutrition Referral:    Referral received. Called and spoke with patient, explained that RD is available to address nutrition throughout the spectrum of care. She reports having weight loss, usually about 107-110#. She has difficulty gaining weight after losing. Had a stressful few months which contributed to weight loss. She has been started on remeron for sleep and hopes it will help her appetite. She does not have a few appetite. Discussed strategies to minimize further weight loss. [employer] message sent to open communication channel and continue to be a resource for any nutrition related questions or concerns.        Ht Readings from Last 1 Encounters:   09/20/23 5' 4\" (1.626 m)       Wt Readings from Last 5 Encounters:   09/20/23 100 lb (45.4 kg)   09/20/23 100 lb 6.4 oz (45.5 kg)   09/13/23 102 lb (46.3 kg)   09/06/23 100 lb (45.4 kg)   08/29/23 100 lb 8.5 oz (45.6 kg)     Signed By: Ceci Camargo, [address]

## 2023-10-02 ENCOUNTER — TELEPHONE (OUTPATIENT)
Age: 53
End: 2023-10-02

## 2023-10-02 NOTE — TELEPHONE ENCOUNTER
Patient called and stated that [facility] [employer] got the prescription that was sent over for her but that they cannot read the handwriting. Patient requested that it be refaxed if possible.      Fx# [phone]  # [phone]

## 2023-10-02 NOTE — TELEPHONE ENCOUNTER
[place] at [facility]  [phone]    10/02/23 1:19 PM EDT - Left voicemail letting patient know that we refaxed it.

## 2023-10-04 ENCOUNTER — OFFICE VISIT (OUTPATIENT)
Age: 53
End: 2023-10-04
Payer: COMMERCIAL

## 2023-10-04 ENCOUNTER — HOSPITAL ENCOUNTER (OUTPATIENT)
Facility: HOSPITAL | Age: 53
Setting detail: INFUSION SERIES
End: 2023-10-04
Payer: COMMERCIAL

## 2023-10-04 ENCOUNTER — RESEARCH ENCOUNTER (OUTPATIENT)
Age: 53
End: 2023-10-04

## 2023-10-04 VITALS
SYSTOLIC BLOOD PRESSURE: 103 MMHG | OXYGEN SATURATION: 100 % | HEART RATE: 66 BPM | BODY MASS INDEX: 17.51 KG/M2 | RESPIRATION RATE: 16 BRPM | TEMPERATURE: 97.4 F | WEIGHT: 102 LBS | DIASTOLIC BLOOD PRESSURE: 65 MMHG

## 2023-10-04 VITALS
BODY MASS INDEX: 17.42 KG/M2 | DIASTOLIC BLOOD PRESSURE: 65 MMHG | HEART RATE: 66 BPM | WEIGHT: 102 LBS | HEIGHT: 64 IN | RESPIRATION RATE: 16 BRPM | TEMPERATURE: 97.4 F | SYSTOLIC BLOOD PRESSURE: 103 MMHG | OXYGEN SATURATION: 100 %

## 2023-10-04 DIAGNOSIS — Z17.0 MALIGNANT NEOPLASM OF CENTRAL PORTION OF LEFT BREAST IN FEMALE, ESTROGEN RECEPTOR POSITIVE (HCC): Primary | ICD-10-CM

## 2023-10-04 DIAGNOSIS — C50.112 MALIGNANT NEOPLASM OF CENTRAL PORTION OF LEFT BREAST IN FEMALE, ESTROGEN RECEPTOR POSITIVE (HCC): Primary | ICD-10-CM

## 2023-10-04 DIAGNOSIS — Z51.11 ENCOUNTER FOR ANTINEOPLASTIC CHEMOTHERAPY: Primary | ICD-10-CM

## 2023-10-04 DIAGNOSIS — Z51.11 ENCOUNTER FOR ANTINEOPLASTIC CHEMOTHERAPY: ICD-10-CM

## 2023-10-04 DIAGNOSIS — C50.912 INVASIVE DUCTAL CARCINOMA OF BREAST, FEMALE, LEFT (HCC): ICD-10-CM

## 2023-10-04 DIAGNOSIS — Z76.89 PREVENTION OF CHEMOTHERAPY-INDUCED NEUTROPENIA: ICD-10-CM

## 2023-10-04 LAB
ALBUMIN SERPL-MCNC: 3.6 G/DL (ref 3.5–5)
ALBUMIN/GLOB SERPL: 1.1 (ref 1.1–2.2)
ALP SERPL-CCNC: 60 U/L (ref 45–117)
ALT SERPL-CCNC: 44 U/L (ref 12–78)
ANION GAP SERPL CALC-SCNC: 4 MMOL/L (ref 5–15)
AST SERPL-CCNC: 23 U/L (ref 15–37)
BASOPHILS # BLD: 0.1 K/UL (ref 0–0.1)
BASOPHILS NFR BLD: 1 % (ref 0–1)
BILIRUB SERPL-MCNC: 0.3 MG/DL (ref 0.2–1)
BUN SERPL-MCNC: 14 MG/DL (ref 6–20)
BUN/CREAT SERPL: 28 (ref 12–20)
CALCIUM SERPL-MCNC: 8.6 MG/DL (ref 8.5–10.1)
CHLORIDE SERPL-SCNC: 105 MMOL/L (ref 97–108)
CO2 SERPL-SCNC: 29 MMOL/L (ref 21–32)
CREAT SERPL-MCNC: 0.5 MG/DL (ref 0.55–1.02)
DIFFERENTIAL METHOD BLD: ABNORMAL
EOSINOPHIL # BLD: 0 K/UL (ref 0–0.4)
EOSINOPHIL NFR BLD: 0 % (ref 0–7)
ERYTHROCYTE [DISTWIDTH] IN BLOOD BY AUTOMATED COUNT: 12.2 % (ref 11.5–14.5)
GLOBULIN SER CALC-MCNC: 3.2 G/DL (ref 2–4)
GLUCOSE SERPL-MCNC: 91 MG/DL (ref 65–100)
HCT VFR BLD AUTO: 30.7 % (ref 35–47)
HGB BLD-MCNC: 10.2 G/DL (ref 11.5–16)
IMM GRANULOCYTES # BLD AUTO: 0 K/UL
IMM GRANULOCYTES NFR BLD AUTO: 0 %
LYMPHOCYTES # BLD: 2.1 K/UL (ref 0.8–3.5)
LYMPHOCYTES NFR BLD: 23 % (ref 12–49)
MCH RBC QN AUTO: 29.7 PG (ref 26–34)
MCHC RBC AUTO-ENTMCNC: 33.2 G/DL (ref 30–36.5)
MCV RBC AUTO: 89.2 FL (ref 80–99)
METAMYELOCYTES NFR BLD MANUAL: 13 %
MONOCYTES # BLD: 1.1 K/UL (ref 0–1)
MONOCYTES NFR BLD: 12 % (ref 5–13)
MYELOCYTES NFR BLD MANUAL: 6 %
NEUTS BAND NFR BLD MANUAL: 22 % (ref 0–6)
NEUTS SEG # BLD: 4.1 K/UL (ref 1.8–8)
NEUTS SEG NFR BLD: 23 % (ref 32–75)
NRBC # BLD: 0 K/UL (ref 0–0.01)
NRBC BLD-RTO: 0 PER 100 WBC
PLATELET # BLD AUTO: 303 K/UL (ref 150–400)
PMV BLD AUTO: 9 FL (ref 8.9–12.9)
POTASSIUM SERPL-SCNC: 3.5 MMOL/L (ref 3.5–5.1)
PROT SERPL-MCNC: 6.8 G/DL (ref 6.4–8.2)
RBC # BLD AUTO: 3.44 M/UL (ref 3.8–5.2)
RBC MORPH BLD: ABNORMAL
SODIUM SERPL-SCNC: 138 MMOL/L (ref 136–145)
TROPONIN I SERPL HS-MCNC: 24 NG/L (ref 0–51)
WBC # BLD AUTO: 9.1 K/UL (ref 3.6–11)

## 2023-10-04 PROCEDURE — 85025 COMPLETE CBC W/AUTO DIFF WBC: CPT

## 2023-10-04 PROCEDURE — 2580000003 HC RX 258: Performed by: INTERNAL MEDICINE

## 2023-10-04 PROCEDURE — 96367 TX/PROPH/DG ADDL SEQ IV INF: CPT

## 2023-10-04 PROCEDURE — 96413 CHEMO IV INFUSION 1 HR: CPT

## 2023-10-04 PROCEDURE — 84484 ASSAY OF TROPONIN QUANT: CPT

## 2023-10-04 PROCEDURE — 96411 CHEMO IV PUSH ADDL DRUG: CPT

## 2023-10-04 PROCEDURE — 96375 TX/PRO/DX INJ NEW DRUG ADDON: CPT

## 2023-10-04 PROCEDURE — 80053 COMPREHEN METABOLIC PANEL: CPT

## 2023-10-04 PROCEDURE — 99215 OFFICE O/P EST HI 40 MIN: CPT | Performed by: INTERNAL MEDICINE

## 2023-10-04 PROCEDURE — 36415 COLL VENOUS BLD VENIPUNCTURE: CPT

## 2023-10-04 PROCEDURE — 6360000002 HC RX W HCPCS: Performed by: INTERNAL MEDICINE

## 2023-10-04 RX ORDER — PALONOSETRON 0.05 MG/ML
0.25 INJECTION, SOLUTION INTRAVENOUS ONCE
Status: COMPLETED | OUTPATIENT
Start: 2023-10-04 | End: 2023-10-04

## 2023-10-04 RX ORDER — SODIUM CHLORIDE 0.9 % (FLUSH) 0.9 %
5-40 SYRINGE (ML) INJECTION PRN
Status: DISCONTINUED | OUTPATIENT
Start: 2023-10-04 | End: 2023-10-05 | Stop reason: HOSPADM

## 2023-10-04 RX ORDER — DOXORUBICIN HYDROCHLORIDE 2 MG/ML
60 INJECTION, SOLUTION INTRAVENOUS ONCE
Status: COMPLETED | OUTPATIENT
Start: 2023-10-04 | End: 2023-10-04

## 2023-10-04 RX ORDER — SODIUM CHLORIDE 9 MG/ML
5-250 INJECTION, SOLUTION INTRAVENOUS PRN
Status: DISCONTINUED | OUTPATIENT
Start: 2023-10-04 | End: 2023-10-05 | Stop reason: HOSPADM

## 2023-10-04 RX ADMIN — DOXORUBICIN HYDROCHLORIDE 88 MG: 2 INJECTION, SOLUTION INTRAVENOUS at 13:38

## 2023-10-04 RX ADMIN — CYCLOPHOSPHAMIDE 880 MG: 200 INJECTION, SOLUTION INTRAVENOUS at 13:54

## 2023-10-04 RX ADMIN — PALONOSETRON HYDROCHLORIDE 0.25 MG: 0.25 INJECTION INTRAVENOUS at 12:07

## 2023-10-04 RX ADMIN — PEGFILGRASTIM 6 MG: KIT SUBCUTANEOUS at 15:07

## 2023-10-04 RX ADMIN — FOSAPREPITANT 150 MG: 150 INJECTION, POWDER, LYOPHILIZED, FOR SOLUTION INTRAVENOUS at 12:35

## 2023-10-04 RX ADMIN — DEXAMETHASONE SODIUM PHOSPHATE 12 MG: 4 INJECTION, SOLUTION INTRA-ARTICULAR; INTRALESIONAL; INTRAMUSCULAR; INTRAVENOUS; SOFT TISSUE at 12:15

## 2023-10-04 ASSESSMENT — PAIN SCALES - GENERAL: PAINLEVEL_OUTOF10: 0

## 2023-10-04 NOTE — PROGRESS NOTES
Jagdish Adams is a 47 y.o. female here today to follow up for breast cancer    1. Have you been to the ER, urgent care clinic since your last visit? Hospitalized since your last visit?no    2. Have you seen or consulted any other health care providers outside of the [address] [place] [address] since your last visit? Include any pap smears or colon screening.  no
Debbi Mo MD      Return in about 2 weeks (around 10/18/2023) for ruslan, ivy/JUD brandon.

## 2023-10-05 ENCOUNTER — TELEPHONE (OUTPATIENT)
Age: 53
End: 2023-10-05

## 2023-10-05 NOTE — TELEPHONE ENCOUNTER
Called patient back, discussed timeline for surgery and XRT. She also needs midpoint chemo follow up with Dr. Shirley Jarvis after she finishes 4th cycle of chemo on 11/1. Gave pt appt for 11/13 at 9am. Reviewed date/time/location with patient. She said Dr. Man Bragg will be gone the whole month of February, but I told her this shouldn't effect her surgery timeline. She will need nipple delays first and the earliest she could potentially have this is mid February. Discussed mastectomy/recon surgery usually done 2-3 weeks after delay so will likely fall around 1st week of March. She was very appreciative of phone call.

## 2023-10-11 ENCOUNTER — APPOINTMENT (OUTPATIENT)
Facility: HOSPITAL | Age: 53
End: 2023-10-11
Payer: COMMERCIAL

## 2023-10-11 ENCOUNTER — TELEPHONE (OUTPATIENT)
Age: 53
End: 2023-10-11

## 2023-10-11 NOTE — TELEPHONE ENCOUNTER
Rogelio Reynolds at [facility]  [phone]    10/11/23 12:05 PM EDT - Called patient back to discuss her symptoms. Patient stated that she was having some weakness in her right arm. It has gotten slightly better since Sunday. She denied any pain, redness, or warmth. She said that is was slightly larger than her other arm but wouldn't classify it as swollen. Advised patient to watch it for now, as it could be partly due to the chemotherapy. Advised her to call us back in her arm does become swollen or if she develops any pain in that arm otherwise we will see her next week. Patient understood and had no further questions at this time.

## 2023-10-11 NOTE — TELEPHONE ENCOUNTER
Pt called in wanting to know if the below is normal    Pts right arm beside where she got her port is still very weak    CB# [phone]

## 2023-10-12 ENCOUNTER — TELEPHONE (OUTPATIENT)
Age: 53
End: 2023-10-12

## 2023-10-12 ENCOUNTER — HOSPITAL ENCOUNTER (OUTPATIENT)
Dept: VASCULAR SURGERY | Facility: HOSPITAL | Age: 53
Discharge: HOME | End: 2023-10-14
Attending: INTERNAL MEDICINE
Payer: COMMERCIAL

## 2023-10-12 DIAGNOSIS — C50.112 MALIGNANT NEOPLASM OF CENTRAL PORTION OF LEFT BREAST IN FEMALE, ESTROGEN RECEPTOR POSITIVE (HCC): Primary | ICD-10-CM

## 2023-10-12 DIAGNOSIS — R60.9 EDEMA, UNSPECIFIED TYPE: ICD-10-CM

## 2023-10-12 DIAGNOSIS — Z17.0 MALIGNANT NEOPLASM OF CENTRAL PORTION OF LEFT BREAST IN FEMALE, ESTROGEN RECEPTOR POSITIVE (HCC): Primary | ICD-10-CM

## 2023-10-12 DIAGNOSIS — C50.112 MALIGNANT NEOPLASM OF CENTRAL PORTION OF LEFT BREAST IN FEMALE, ESTROGEN RECEPTOR POSITIVE (HCC): ICD-10-CM

## 2023-10-12 DIAGNOSIS — Z17.0 MALIGNANT NEOPLASM OF CENTRAL PORTION OF LEFT BREAST IN FEMALE, ESTROGEN RECEPTOR POSITIVE (HCC): ICD-10-CM

## 2023-10-12 DIAGNOSIS — I82.629 ACUTE DEEP VEIN THROMBOSIS (DVT) OF OTHER VEIN OF UPPER EXTREMITY, UNSPECIFIED LATERALITY (HCC): Primary | ICD-10-CM

## 2023-10-12 PROCEDURE — 93971 EXTREMITY STUDY: CPT

## 2023-10-12 NOTE — TELEPHONE ENCOUNTER
Rogelio Reynolds at [facility]  [phone]    10/12/23 8:37 AM EDT - Patient called in stating that her fingers were now swollen and her right arm was discolored. Advised patient that we were ordering a doppler of that right arm. Helped patient get it scheduled. She is now scheduled at [facility] today at 10:30 AM for the doppler. Provided patient with instructions for that appointment and advised her on what to expect from the scan. Patient had no further questions at this time.

## 2023-10-18 ENCOUNTER — HOSPITAL ENCOUNTER (OUTPATIENT)
Facility: HOSPITAL | Age: 53
Setting detail: INFUSION SERIES
End: 2023-10-18
Payer: COMMERCIAL

## 2023-10-18 ENCOUNTER — OFFICE VISIT (OUTPATIENT)
Age: 53
End: 2023-10-18

## 2023-10-18 ENCOUNTER — RESEARCH ENCOUNTER (OUTPATIENT)
Age: 53
End: 2023-10-18

## 2023-10-18 VITALS
DIASTOLIC BLOOD PRESSURE: 55 MMHG | OXYGEN SATURATION: 98 % | TEMPERATURE: 97.4 F | RESPIRATION RATE: 16 BRPM | HEART RATE: 78 BPM | WEIGHT: 102 LBS | BODY MASS INDEX: 17.51 KG/M2 | SYSTOLIC BLOOD PRESSURE: 102 MMHG

## 2023-10-18 VITALS
RESPIRATION RATE: 16 BRPM | TEMPERATURE: 97.4 F | OXYGEN SATURATION: 100 % | HEART RATE: 83 BPM | WEIGHT: 102.3 LBS | BODY MASS INDEX: 17.46 KG/M2 | SYSTOLIC BLOOD PRESSURE: 115 MMHG | DIASTOLIC BLOOD PRESSURE: 55 MMHG | HEIGHT: 64 IN

## 2023-10-18 DIAGNOSIS — Z76.89 PREVENTION OF CHEMOTHERAPY-INDUCED NEUTROPENIA: ICD-10-CM

## 2023-10-18 DIAGNOSIS — Z79.899 ENCOUNTER FOR MONITORING CARDIOTOXIC DRUG THERAPY: Primary | ICD-10-CM

## 2023-10-18 DIAGNOSIS — Z51.11 ENCOUNTER FOR ANTINEOPLASTIC CHEMOTHERAPY: ICD-10-CM

## 2023-10-18 DIAGNOSIS — Z51.11 ENCOUNTER FOR ANTINEOPLASTIC CHEMOTHERAPY: Primary | ICD-10-CM

## 2023-10-18 DIAGNOSIS — Z51.81 ENCOUNTER FOR MONITORING CARDIOTOXIC DRUG THERAPY: Primary | ICD-10-CM

## 2023-10-18 DIAGNOSIS — C50.112 MALIGNANT NEOPLASM OF CENTRAL PORTION OF LEFT BREAST IN FEMALE, ESTROGEN RECEPTOR POSITIVE (HCC): Primary | ICD-10-CM

## 2023-10-18 DIAGNOSIS — C50.912 INVASIVE DUCTAL CARCINOMA OF BREAST, FEMALE, LEFT (HCC): ICD-10-CM

## 2023-10-18 DIAGNOSIS — Z17.0 MALIGNANT NEOPLASM OF CENTRAL PORTION OF LEFT BREAST IN FEMALE, ESTROGEN RECEPTOR POSITIVE (HCC): Primary | ICD-10-CM

## 2023-10-18 LAB
ALBUMIN SERPL-MCNC: 3.5 G/DL (ref 3.5–5)
ALBUMIN/GLOB SERPL: 1.1 (ref 1.1–2.2)
ALP SERPL-CCNC: 71 U/L (ref 45–117)
ALT SERPL-CCNC: 28 U/L (ref 12–78)
ANION GAP SERPL CALC-SCNC: 4 MMOL/L (ref 5–15)
AST SERPL-CCNC: 19 U/L (ref 15–37)
BASOPHILS # BLD: 0 K/UL (ref 0–0.1)
BASOPHILS NFR BLD: 0 % (ref 0–1)
BILIRUB SERPL-MCNC: 0.3 MG/DL (ref 0.2–1)
BUN SERPL-MCNC: 13 MG/DL (ref 6–20)
BUN/CREAT SERPL: 22 (ref 12–20)
CALCIUM SERPL-MCNC: 8.8 MG/DL (ref 8.5–10.1)
CHLORIDE SERPL-SCNC: 109 MMOL/L (ref 97–108)
CO2 SERPL-SCNC: 30 MMOL/L (ref 21–32)
CREAT SERPL-MCNC: 0.58 MG/DL (ref 0.55–1.02)
DIFFERENTIAL METHOD BLD: ABNORMAL
EOSINOPHIL # BLD: 0 K/UL (ref 0–0.4)
EOSINOPHIL NFR BLD: 0 % (ref 0–7)
ERYTHROCYTE [DISTWIDTH] IN BLOOD BY AUTOMATED COUNT: 12.9 % (ref 11.5–14.5)
GLOBULIN SER CALC-MCNC: 3.1 G/DL (ref 2–4)
GLUCOSE SERPL-MCNC: 112 MG/DL (ref 65–100)
HCT VFR BLD AUTO: 29.3 % (ref 35–47)
HGB BLD-MCNC: 9.7 G/DL (ref 11.5–16)
IMM GRANULOCYTES # BLD AUTO: 0 K/UL
IMM GRANULOCYTES NFR BLD AUTO: 0 %
LYMPHOCYTES # BLD: 1.1 K/UL (ref 0.8–3.5)
LYMPHOCYTES NFR BLD: 15 % (ref 12–49)
MCH RBC QN AUTO: 29.4 PG (ref 26–34)
MCHC RBC AUTO-ENTMCNC: 33.1 G/DL (ref 30–36.5)
MCV RBC AUTO: 88.8 FL (ref 80–99)
METAMYELOCYTES NFR BLD MANUAL: 3 %
MONOCYTES # BLD: 0.7 K/UL (ref 0–1)
MONOCYTES NFR BLD: 10 % (ref 5–13)
MYELOCYTES NFR BLD MANUAL: 10 %
NEUTS BAND NFR BLD MANUAL: 7 % (ref 0–6)
NEUTS SEG # BLD: 4.4 K/UL (ref 1.8–8)
NEUTS SEG NFR BLD: 55 % (ref 32–75)
NRBC # BLD: 0 K/UL (ref 0–0.01)
NRBC BLD-RTO: 0 PER 100 WBC
PATH REV BLD -IMP: NORMAL
PLATELET # BLD AUTO: 205 K/UL (ref 150–400)
PMV BLD AUTO: 9.5 FL (ref 8.9–12.9)
POTASSIUM SERPL-SCNC: 3.3 MMOL/L (ref 3.5–5.1)
PROT SERPL-MCNC: 6.6 G/DL (ref 6.4–8.2)
RBC # BLD AUTO: 3.3 M/UL (ref 3.8–5.2)
RBC MORPH BLD: ABNORMAL
SODIUM SERPL-SCNC: 143 MMOL/L (ref 136–145)
TROPONIN I SERPL HS-MCNC: 16 NG/L (ref 0–51)
WBC # BLD AUTO: 7.1 K/UL (ref 3.6–11)

## 2023-10-18 PROCEDURE — 2580000003 HC RX 258: Performed by: INTERNAL MEDICINE

## 2023-10-18 PROCEDURE — 96411 CHEMO IV PUSH ADDL DRUG: CPT

## 2023-10-18 PROCEDURE — 96367 TX/PROPH/DG ADDL SEQ IV INF: CPT

## 2023-10-18 PROCEDURE — 6360000002 HC RX W HCPCS: Performed by: INTERNAL MEDICINE

## 2023-10-18 PROCEDURE — 84484 ASSAY OF TROPONIN QUANT: CPT

## 2023-10-18 PROCEDURE — 36415 COLL VENOUS BLD VENIPUNCTURE: CPT

## 2023-10-18 PROCEDURE — 96375 TX/PRO/DX INJ NEW DRUG ADDON: CPT

## 2023-10-18 PROCEDURE — 6370000000 HC RX 637 (ALT 250 FOR IP): Performed by: NURSE PRACTITIONER

## 2023-10-18 PROCEDURE — 96413 CHEMO IV INFUSION 1 HR: CPT

## 2023-10-18 PROCEDURE — 80053 COMPREHEN METABOLIC PANEL: CPT

## 2023-10-18 PROCEDURE — 85025 COMPLETE CBC W/AUTO DIFF WBC: CPT

## 2023-10-18 RX ORDER — PALONOSETRON 0.05 MG/ML
0.25 INJECTION, SOLUTION INTRAVENOUS ONCE
Status: COMPLETED | OUTPATIENT
Start: 2023-10-18 | End: 2023-10-18

## 2023-10-18 RX ORDER — SODIUM CHLORIDE 0.9 % (FLUSH) 0.9 %
5-40 SYRINGE (ML) INJECTION PRN
Status: DISCONTINUED | OUTPATIENT
Start: 2023-10-18 | End: 2023-10-19 | Stop reason: HOSPADM

## 2023-10-18 RX ORDER — TRAZODONE HYDROCHLORIDE 50 MG/1
TABLET ORAL
COMMUNITY
Start: 2023-10-04

## 2023-10-18 RX ORDER — DOXORUBICIN HYDROCHLORIDE 2 MG/ML
60 INJECTION, SOLUTION INTRAVENOUS ONCE
Status: COMPLETED | OUTPATIENT
Start: 2023-10-18 | End: 2023-10-18

## 2023-10-18 RX ORDER — SODIUM CHLORIDE 9 MG/ML
5-250 INJECTION, SOLUTION INTRAVENOUS PRN
Status: DISCONTINUED | OUTPATIENT
Start: 2023-10-18 | End: 2023-10-19 | Stop reason: HOSPADM

## 2023-10-18 RX ORDER — POTASSIUM CHLORIDE 750 MG/1
40 TABLET, EXTENDED RELEASE ORAL ONCE
Status: COMPLETED | OUTPATIENT
Start: 2023-10-18 | End: 2023-10-18

## 2023-10-18 RX ADMIN — PEGFILGRASTIM 6 MG: KIT SUBCUTANEOUS at 13:40

## 2023-10-18 RX ADMIN — POTASSIUM CHLORIDE 40 MEQ: 750 TABLET, EXTENDED RELEASE ORAL at 10:58

## 2023-10-18 RX ADMIN — DEXAMETHASONE SODIUM PHOSPHATE 12 MG: 4 INJECTION, SOLUTION INTRA-ARTICULAR; INTRALESIONAL; INTRAMUSCULAR; INTRAVENOUS; SOFT TISSUE at 11:03

## 2023-10-18 RX ADMIN — DOXORUBICIN HYDROCHLORIDE 88 MG: 2 INJECTION, SOLUTION INTRAVENOUS at 12:28

## 2023-10-18 RX ADMIN — FOSAPREPITANT 150 MG: 150 INJECTION, POWDER, LYOPHILIZED, FOR SOLUTION INTRAVENOUS at 11:24

## 2023-10-18 RX ADMIN — CYCLOPHOSPHAMIDE 880 MG: 200 INJECTION, SOLUTION INTRAVENOUS at 13:03

## 2023-10-18 RX ADMIN — PALONOSETRON HYDROCHLORIDE 0.25 MG: 0.25 INJECTION INTRAVENOUS at 11:02

## 2023-10-18 ASSESSMENT — PAIN SCALES - GENERAL: PAINLEVEL_OUTOF10: 0

## 2023-10-18 NOTE — PROGRESS NOTES
Patient in today for labs and follow-up visit with Dr. Suleman Zhang. Today is C3D1 on study FB-12. Patient reports the following adverse events: gr 1 loss of appetite, gr 1 constipation, gr 1 fatigue, gr 1 hair loss, gr 1 nausea, gr 2 hot flashes, gr 1 insomnia, gr 1 anxiety, gr 1 dyspnea, gr 1 neuropathy, gr 1 headache, gr 1 rectal discomfort she attributes to hemorrhoids due to constipation. Vital signs are stable. Patient to receive AC. Next appt is 11/1/2023.

## 2023-10-18 NOTE — PROGRESS NOTES
Jason Mcneal is a 47 y.o. female here today to follow up for breast cancer    1. Have you been to the ER, urgent care clinic since your last visit? Hospitalized since your last visit? no    2. Have you seen or consulted any other health care providers outside of the [address] [place] [address] since your last visit? Include any pap smears or colon screening.  no
research biopsy, her tumor does have abnormal signaling. She DOES want to participate in the study, she has obtained CT Chest 9/19/23. The side effects of trastuzumab were discussed including a 4%-5% risk of dropping  her ejection fraction while on treatment and about a 1% risk of CHF. Additional AE with pertuzumab discussed including an acne rash (and the use of doxycyline 100 mg bid to help prevent) and diarrhea and consideration of additional cardiomyopathy. She is agreeable to DD [facility] [place], operated by [facility] then weekly T as above. She has signed informed consent. She presents today for Cycle 3 AC. Labs reviewed and will proceed today with treatment. Cold caps discussed, discussed not working as well with AC. Discussed risks of COVID19 and precautions to take. Discussed oral and peripheral cryotherapy, she has enrolled in neuropathy study    The patient was given the following prescriptions with written and verbal instructions on how to use each:  Compazine, zofran, olanzapine, a wig, and emla cream.  IV Kathya Cathy will be used with AC. Neulasta the following day (bone pain side effect discussed) with AC. We will plan to see the patient in follow up at least once per cycle, or sooner if symptoms warrant. Labs with each cycle for intensive monitoring for toxicity      Dr. Sunday Lara placed port on 8/29/23    2. Emotional well being/recurrent depression:  She has excellent support and is coping well with her disease; also taking clonazepam.  Gave number for Marleny Chu, she has not called him yet. Currently taking 25 mg of zoloft    3. PALB2 VUS:  not clinically relevant at this time    4. Pulmonary nodule: 3 mm nodule to RUL seen on CT 9/19/23. Will repeat CT Chest after completion of chemotherapy. Unlikely of clinical significance. No evidence of metastatic disease    5. Monitoring cardiotoxic medication: Baseline TTE 9/6/23 EF 64%. Will repeat every 12 weeks while undergoing HER-2 therapy.      6. Right Subclavian

## 2023-10-19 ENCOUNTER — TELEPHONE (OUTPATIENT)
Age: 53
End: 2023-10-19

## 2023-10-19 NOTE — TELEPHONE ENCOUNTER
Patient called to update me about her treatment thus far. Provided support and encouragement. Expressed my appreciation for her phone call. Recommended patient reach out to Dr. Love Daughters team for any specific questions regarding treatment. Patient communicated an understanding of and agreement with this plan.

## 2023-10-25 ENCOUNTER — APPOINTMENT (OUTPATIENT)
Facility: HOSPITAL | Age: 53
End: 2023-10-25
Payer: COMMERCIAL

## 2023-10-27 ENCOUNTER — TELEPHONE (OUTPATIENT)
Age: 53
End: 2023-10-27

## 2023-10-27 NOTE — TELEPHONE ENCOUNTER
Pt called in stating on her right arm when she holds it up in air. At the top there is a knot.  Theres no swelling but wanted to know if it was something that needed to be looked at    CB# [phone]

## 2023-10-27 NOTE — TELEPHONE ENCOUNTER
Rogelio Reynolds at [facility]  [phone]    10/27/23 12:05 PM RORY - Chaparrita Keene with pt and let her know that Curtis Hernandez is not worried at this time and it is most likely just residual inflammation from the blood clot but we will look at the spot when she comes in on Wednesday

## 2023-10-27 NOTE — TELEPHONE ENCOUNTER
[facility] at [facility]  [phone]    10/27/23 11:46 AM LENIT - Jennifer Birmingham pt to discuss in more detail. Patient said she has noticed a small \"knot\" on her Right arm around her arm pit area approximately the size of a dime. She said it is not painful but also more of a softer feeling knot. She is just concerned because it is the opposite arm of her affected side but also the arm she had the blood clot in so she would just like some direction as to what she needs to do. Patient is due to see  us wed 11/1/23 for tx/OV with Dr Tana Jones.

## 2023-10-31 ENCOUNTER — TELEPHONE (OUTPATIENT)
Age: 53
End: 2023-10-31

## 2023-10-31 NOTE — TELEPHONE ENCOUNTER
Rogelio Reynolds at [facility]  [phone]    10/31/23 9:46 AM EDT - Called patient back to discuss her concerns. Patient wanted to discuss the lymph node she has been feeling on her right side. Patient spoke with Eduin Hidalgo NP to which they discussed physically assessing it tomorrow at her appointment. Reassured patient and answered her questions. Patient had no other questions at this time.

## 2023-10-31 NOTE — TELEPHONE ENCOUNTER
Pt would like to speak to Marianna about something she found on her arm last week    CB# [phone]

## 2023-11-01 ENCOUNTER — OFFICE VISIT (OUTPATIENT)
Age: 53
End: 2023-11-01

## 2023-11-01 ENCOUNTER — RESEARCH ENCOUNTER (OUTPATIENT)
Age: 53
End: 2023-11-01

## 2023-11-01 ENCOUNTER — HOSPITAL ENCOUNTER (OUTPATIENT)
Facility: HOSPITAL | Age: 53
Setting detail: INFUSION SERIES
Discharge: HOME | End: 2023-11-01
Payer: COMMERCIAL

## 2023-11-01 VITALS
OXYGEN SATURATION: 97 % | WEIGHT: 99.6 LBS | TEMPERATURE: 97.5 F | BODY MASS INDEX: 17 KG/M2 | SYSTOLIC BLOOD PRESSURE: 108 MMHG | DIASTOLIC BLOOD PRESSURE: 73 MMHG | HEIGHT: 64 IN | RESPIRATION RATE: 18 BRPM | HEART RATE: 108 BPM

## 2023-11-01 VITALS
SYSTOLIC BLOOD PRESSURE: 108 MMHG | WEIGHT: 99 LBS | TEMPERATURE: 97.5 F | BODY MASS INDEX: 16.98 KG/M2 | HEART RATE: 108 BPM | RESPIRATION RATE: 17 BRPM | DIASTOLIC BLOOD PRESSURE: 73 MMHG | OXYGEN SATURATION: 97 %

## 2023-11-01 DIAGNOSIS — Z17.0 MALIGNANT NEOPLASM OF CENTRAL PORTION OF LEFT BREAST IN FEMALE, ESTROGEN RECEPTOR POSITIVE (HCC): Primary | ICD-10-CM

## 2023-11-01 DIAGNOSIS — Z76.89 PREVENTION OF CHEMOTHERAPY-INDUCED NEUTROPENIA: ICD-10-CM

## 2023-11-01 DIAGNOSIS — C50.112 MALIGNANT NEOPLASM OF CENTRAL PORTION OF LEFT BREAST IN FEMALE, ESTROGEN RECEPTOR POSITIVE (HCC): Primary | ICD-10-CM

## 2023-11-01 DIAGNOSIS — C50.912 INVASIVE DUCTAL CARCINOMA OF BREAST, FEMALE, LEFT (HCC): ICD-10-CM

## 2023-11-01 DIAGNOSIS — Z79.899 ENCOUNTER FOR MONITORING CARDIOTOXIC DRUG THERAPY: Primary | ICD-10-CM

## 2023-11-01 DIAGNOSIS — I82.621 ACUTE DEEP VEIN THROMBOSIS (DVT) OF RIGHT UPPER EXTREMITY, UNSPECIFIED VEIN (HCC): ICD-10-CM

## 2023-11-01 DIAGNOSIS — Z51.81 ENCOUNTER FOR MONITORING CARDIOTOXIC DRUG THERAPY: Primary | ICD-10-CM

## 2023-11-01 DIAGNOSIS — Z51.11 ENCOUNTER FOR ANTINEOPLASTIC CHEMOTHERAPY: ICD-10-CM

## 2023-11-01 DIAGNOSIS — R22.31 SUBCUTANEOUS NODULE OF RIGHT UPPER EXTREMITY: ICD-10-CM

## 2023-11-01 DIAGNOSIS — Z51.11 ENCOUNTER FOR ANTINEOPLASTIC CHEMOTHERAPY: Primary | ICD-10-CM

## 2023-11-01 LAB
ALBUMIN SERPL-MCNC: 3.7 G/DL (ref 3.5–5)
ALBUMIN/GLOB SERPL: 1.1 (ref 1.1–2.2)
ALP SERPL-CCNC: 75 U/L (ref 45–117)
ALT SERPL-CCNC: 28 U/L (ref 12–78)
ANION GAP SERPL CALC-SCNC: 4 MMOL/L (ref 5–15)
AST SERPL-CCNC: 20 U/L (ref 15–37)
BASOPHILS # BLD: 0 K/UL (ref 0–0.1)
BASOPHILS NFR BLD: 0 % (ref 0–1)
BILIRUB SERPL-MCNC: 0.2 MG/DL (ref 0.2–1)
BUN SERPL-MCNC: 9 MG/DL (ref 6–20)
BUN/CREAT SERPL: 17 (ref 12–20)
CALCIUM SERPL-MCNC: 8.7 MG/DL (ref 8.5–10.1)
CHLORIDE SERPL-SCNC: 107 MMOL/L (ref 97–108)
CO2 SERPL-SCNC: 29 MMOL/L (ref 21–32)
CREAT SERPL-MCNC: 0.54 MG/DL (ref 0.55–1.02)
DIFFERENTIAL METHOD BLD: ABNORMAL
EOSINOPHIL # BLD: 0 K/UL (ref 0–0.4)
EOSINOPHIL NFR BLD: 0 % (ref 0–7)
ERYTHROCYTE [DISTWIDTH] IN BLOOD BY AUTOMATED COUNT: 15.5 % (ref 11.5–14.5)
GLOBULIN SER CALC-MCNC: 3.3 G/DL (ref 2–4)
GLUCOSE SERPL-MCNC: 104 MG/DL (ref 65–100)
HCT VFR BLD AUTO: 28.3 % (ref 35–47)
HGB BLD-MCNC: 9.4 G/DL (ref 11.5–16)
IMM GRANULOCYTES # BLD AUTO: 0 K/UL
IMM GRANULOCYTES NFR BLD AUTO: 0 %
LYMPHOCYTES # BLD: 0.5 K/UL (ref 0.8–3.5)
LYMPHOCYTES NFR BLD: 17 % (ref 12–49)
MCH RBC QN AUTO: 29.9 PG (ref 26–34)
MCHC RBC AUTO-ENTMCNC: 33.2 G/DL (ref 30–36.5)
MCV RBC AUTO: 90.1 FL (ref 80–99)
METAMYELOCYTES NFR BLD MANUAL: 9 %
MONOCYTES # BLD: 0.8 K/UL (ref 0–1)
MONOCYTES NFR BLD: 29 % (ref 5–13)
MYELOCYTES NFR BLD MANUAL: 5 %
NEUTS SEG # BLD: 1.1 K/UL (ref 1.8–8)
NEUTS SEG NFR BLD: 40 % (ref 32–75)
NRBC # BLD: 0 K/UL (ref 0–0.01)
NRBC BLD-RTO: 0 PER 100 WBC
PLATELET # BLD AUTO: 206 K/UL (ref 150–400)
PMV BLD AUTO: 9 FL (ref 8.9–12.9)
POTASSIUM SERPL-SCNC: 3.4 MMOL/L (ref 3.5–5.1)
PROT SERPL-MCNC: 7 G/DL (ref 6.4–8.2)
RBC # BLD AUTO: 3.14 M/UL (ref 3.8–5.2)
RBC MORPH BLD: ABNORMAL
SODIUM SERPL-SCNC: 140 MMOL/L (ref 136–145)
TROPONIN I SERPL HS-MCNC: 20 NG/L (ref 0–51)
TROPONIN I SERPL HS-MCNC: 24 NG/L (ref 0–51)
WBC # BLD AUTO: 2.8 K/UL (ref 3.6–11)

## 2023-11-01 PROCEDURE — 6360000002 HC RX W HCPCS: Performed by: INTERNAL MEDICINE

## 2023-11-01 PROCEDURE — 84484 ASSAY OF TROPONIN QUANT: CPT

## 2023-11-01 PROCEDURE — 96367 TX/PROPH/DG ADDL SEQ IV INF: CPT

## 2023-11-01 PROCEDURE — 96377 APPLICATON ON-BODY INJECTOR: CPT

## 2023-11-01 PROCEDURE — 36415 COLL VENOUS BLD VENIPUNCTURE: CPT

## 2023-11-01 PROCEDURE — 96411 CHEMO IV PUSH ADDL DRUG: CPT

## 2023-11-01 PROCEDURE — 96413 CHEMO IV INFUSION 1 HR: CPT

## 2023-11-01 PROCEDURE — 80053 COMPREHEN METABOLIC PANEL: CPT

## 2023-11-01 PROCEDURE — 85025 COMPLETE CBC W/AUTO DIFF WBC: CPT

## 2023-11-01 PROCEDURE — 96375 TX/PRO/DX INJ NEW DRUG ADDON: CPT

## 2023-11-01 PROCEDURE — 6370000000 HC RX 637 (ALT 250 FOR IP): Performed by: NURSE PRACTITIONER

## 2023-11-01 PROCEDURE — 2580000003 HC RX 258: Performed by: INTERNAL MEDICINE

## 2023-11-01 RX ORDER — SODIUM CHLORIDE 0.9 % (FLUSH) 0.9 %
5-40 SYRINGE (ML) INJECTION PRN
Status: DISCONTINUED | OUTPATIENT
Start: 2023-11-01 | End: 2023-11-02 | Stop reason: HOSPADM

## 2023-11-01 RX ORDER — PALONOSETRON 0.05 MG/ML
0.25 INJECTION, SOLUTION INTRAVENOUS ONCE
Status: COMPLETED | OUTPATIENT
Start: 2023-11-01 | End: 2023-11-01

## 2023-11-01 RX ORDER — DOXORUBICIN HYDROCHLORIDE 2 MG/ML
60 INJECTION, SOLUTION INTRAVENOUS ONCE
Status: COMPLETED | OUTPATIENT
Start: 2023-11-01 | End: 2023-11-01

## 2023-11-01 RX ORDER — SODIUM CHLORIDE 9 MG/ML
5-250 INJECTION, SOLUTION INTRAVENOUS PRN
Status: DISCONTINUED | OUTPATIENT
Start: 2023-11-01 | End: 2023-11-02 | Stop reason: HOSPADM

## 2023-11-01 RX ORDER — POTASSIUM CHLORIDE 750 MG/1
40 TABLET, EXTENDED RELEASE ORAL ONCE
Status: COMPLETED | OUTPATIENT
Start: 2023-11-01 | End: 2023-11-01

## 2023-11-01 RX ADMIN — POTASSIUM CHLORIDE 40 MEQ: 750 TABLET, EXTENDED RELEASE ORAL at 10:45

## 2023-11-01 RX ADMIN — PALONOSETRON HYDROCHLORIDE 0.25 MG: 0.25 INJECTION INTRAVENOUS at 10:45

## 2023-11-01 RX ADMIN — PEGFILGRASTIM 6 MG: KIT SUBCUTANEOUS at 13:00

## 2023-11-01 RX ADMIN — DOXORUBICIN HYDROCHLORIDE 88 MG: 2 INJECTION, SOLUTION INTRAVENOUS at 12:07

## 2023-11-01 RX ADMIN — FOSAPREPITANT 150 MG: 150 INJECTION, POWDER, LYOPHILIZED, FOR SOLUTION INTRAVENOUS at 11:10

## 2023-11-01 RX ADMIN — DEXAMETHASONE SODIUM PHOSPHATE 12 MG: 4 INJECTION, SOLUTION INTRA-ARTICULAR; INTRALESIONAL; INTRAMUSCULAR; INTRAVENOUS; SOFT TISSUE at 10:53

## 2023-11-01 RX ADMIN — CYCLOPHOSPHAMIDE 880 MG: 200 INJECTION, SOLUTION INTRAVENOUS at 12:20

## 2023-11-01 ASSESSMENT — PAIN SCALES - GENERAL: PAINLEVEL_OUTOF10: 0

## 2023-11-01 NOTE — PROGRESS NOTES
Cancer [place] at [address], [address] [place] [address]  [place]: [phone]  F: [phone]      Reason for Visit:   Ken Schroeder is a 47 y.o. female who is seen in follow up for breast cancer. Breast Surgeon: Dr. Nancy Yeung    Treatment History:   7/18/23 left breast stellate mass, core bx:  IDC, gr 2, 4 mm, ER + at 98%, MA + at 54%, HER 2 negative at [facility] 1+ (SOHA ration 1; sig/cell 1.85), ki67 59%, DCIS, no LVI  Mammaprint shows luminal B, high risk, index -0.160  8/1/23 left axilla LN core bx:  + for breast cancer  Genetic testing , negative BRCA 1/2 by [employer] 2021  NSABP FB-12  [facility] [place] 9/20/23 -     History of Present Illness: An abnormal mammogram led to the pathology above. Interval history:  Patient presents today for follow up and treatment. Reports gr 1 loss of appetite, gr 1 constipation, gr 1 fatigue, gr 1 hair loss, gr 1 chills, gr 1 hot flashes, gr 1 insomnia, gr 1 anxiety/agitation, gr 1 mouth sores, gr 1 headache, gr 1 urinary leakage, gr 1 rectal discomfort. FH:  mother with breast cancer, dx at age 80; maternal aunt with breast cancer, dx 47-56s and lung cancer; maternal aunt:  ovarian cancer, dx 45s; father with colon cancer dx 62s    Lmp 12/2022    Past Medical History:   Diagnosis Date    Anxiety     Invasive ductal carcinoma of breast, female, left ([address]) 07/24/2023 7/18/23: LEFT breast, Stellate Mass, Stereotatic biopsy: invasive and in situ carcinoma, grade 2. Largest tumor focus measured 4mm. No lymphovascular invasion seen. Prognostic markers pending.     Murmur     PONV (postoperative nausea and vomiting)       Past Surgical History:   Procedure Laterality Date    APPENDECTOMY  2000    COLONOSCOPY  2007    ENDOSCOPY, COLON, DIAGNOSTIC      GI  2007    COLONOSCOPY    NASAL SEPTUM SURGERY  2012    PORT SURGERY Right 8/29/2023    LUZ MARIA CATH PLACEMENT, ULTRASOUND GUIDED CORE BIOPSY OF LEFT BREAST MASS performed by Melia Lanza,

## 2023-11-01 NOTE — PROGRESS NOTES
Viola Fontanez is a 47 y.o. female here today to follow up for breast cancer    1. Have you been to the ER, urgent care clinic since your last visit? Hospitalized since your last visit?no    2. Have you seen or consulted any other health care providers outside of the [address] [place] [address] since your last visit? Include any pap smears or colon screening.  no

## 2023-11-01 NOTE — PROGRESS NOTES
Patient in today for labs and follow-up visit with Dr. Jay Calzada. Today is C4D1 on study FB-12. Patient reports the following adverse events: gr 1 loss of appetite, gr 1 constipation, gr 1 fatigue, gr 1 hair loss, gr 1 chills, gr 1 hot flashes, gr 1 insomnia, gr 1 anxiety/agitation, gr 1 mouth sores, gr 1 headache, gr 1 urinary leakage, gr 1 rectal discomfort. Vital signs are stable. Patient to receive AC. Next appt is 11/15/2023.

## 2023-11-08 ENCOUNTER — HOSPITAL ENCOUNTER (OUTPATIENT)
Facility: HOSPITAL | Age: 53
Discharge: HOME | End: 2023-11-10
Attending: INTERNAL MEDICINE
Payer: COMMERCIAL

## 2023-11-08 VITALS
BODY MASS INDEX: 16.9 KG/M2 | SYSTOLIC BLOOD PRESSURE: 102 MMHG | DIASTOLIC BLOOD PRESSURE: 56 MMHG | HEIGHT: 64 IN | WEIGHT: 99 LBS

## 2023-11-08 DIAGNOSIS — Z51.81 ENCOUNTER FOR MONITORING CARDIOTOXIC DRUG THERAPY: ICD-10-CM

## 2023-11-08 DIAGNOSIS — Z79.899 ENCOUNTER FOR MONITORING CARDIOTOXIC DRUG THERAPY: ICD-10-CM

## 2023-11-08 PROCEDURE — 93308 TTE F-UP OR LMTD: CPT

## 2023-11-09 DIAGNOSIS — Z76.89 PREVENTION OF CHEMOTHERAPY-INDUCED NEUTROPENIA: ICD-10-CM

## 2023-11-09 DIAGNOSIS — Z51.11 ENCOUNTER FOR ANTINEOPLASTIC CHEMOTHERAPY: Primary | ICD-10-CM

## 2023-11-09 DIAGNOSIS — C50.912 INVASIVE DUCTAL CARCINOMA OF BREAST, FEMALE, LEFT (HCC): ICD-10-CM

## 2023-11-09 LAB
ECHO BSA: 1.42 M2
ECHO LA DIAMETER INDEX: 1.93 CM/M2
ECHO LA DIAMETER: 2.8 CM
ECHO LV EDV A2C: 67 ML
ECHO LV EDV A4C: 57 ML
ECHO LV EDV BP: 62 ML (ref 56–104)
ECHO LV EDV INDEX A4C: 39 ML/M2
ECHO LV EDV INDEX BP: 43 ML/M2
ECHO LV EDV NDEX A2C: 46 ML/M2
ECHO LV EJECTION FRACTION A2C: 49 %
ECHO LV EJECTION FRACTION A4C: 65 %
ECHO LV EJECTION FRACTION BIPLANE: 55 % (ref 55–100)
ECHO LV ESV A2C: 35 ML
ECHO LV ESV A4C: 20 ML
ECHO LV ESV BP: 28 ML (ref 19–49)
ECHO LV ESV INDEX A2C: 24 ML/M2
ECHO LV ESV INDEX A4C: 14 ML/M2
ECHO LV ESV INDEX BP: 19 ML/M2
ECHO LV FRACTIONAL SHORTENING: 38 % (ref 28–44)
ECHO LV GLOBAL LONGITUDINAL STRAIN (GLS): -20.4 %
ECHO LV INTERNAL DIMENSION DIASTOLE INDEX: 2.69 CM/M2
ECHO LV INTERNAL DIMENSION DIASTOLIC: 3.9 CM (ref 3.9–5.3)
ECHO LV INTERNAL DIMENSION SYSTOLIC INDEX: 1.66 CM/M2
ECHO LV INTERNAL DIMENSION SYSTOLIC: 2.4 CM
ECHO LV IVSD: 0.8 CM (ref 0.6–0.9)
ECHO LV MASS 2D: 97.4 G (ref 67–162)
ECHO LV MASS INDEX 2D: 67.1 G/M2 (ref 43–95)
ECHO LV POSTERIOR WALL DIASTOLIC: 0.9 CM (ref 0.6–0.9)
ECHO LV RELATIVE WALL THICKNESS RATIO: 0.46

## 2023-11-09 RX ORDER — ALBUTEROL SULFATE 90 UG/1
4 AEROSOL, METERED RESPIRATORY (INHALATION) PRN
OUTPATIENT
Start: 2023-11-22

## 2023-11-09 RX ORDER — ACETAMINOPHEN 325 MG/1
650 TABLET ORAL
OUTPATIENT
Start: 2023-11-15

## 2023-11-09 RX ORDER — ALBUTEROL SULFATE 90 UG/1
4 AEROSOL, METERED RESPIRATORY (INHALATION) PRN
OUTPATIENT
Start: 2023-11-29

## 2023-11-09 RX ORDER — FAMOTIDINE 10 MG/ML
20 INJECTION, SOLUTION INTRAVENOUS ONCE
OUTPATIENT
Start: 2023-11-29 | End: 2023-11-29

## 2023-11-09 RX ORDER — SODIUM CHLORIDE 9 MG/ML
INJECTION, SOLUTION INTRAVENOUS CONTINUOUS
OUTPATIENT
Start: 2023-11-15

## 2023-11-09 RX ORDER — DEXAMETHASONE SODIUM PHOSPHATE 10 MG/ML
10 INJECTION INTRAMUSCULAR; INTRAVENOUS ONCE
OUTPATIENT
Start: 2023-11-15 | End: 2023-11-15

## 2023-11-09 RX ORDER — HEPARIN SODIUM (PORCINE) LOCK FLUSH IV SOLN 100 UNIT/ML 100 UNIT/ML
500 SOLUTION INTRAVENOUS PRN
OUTPATIENT
Start: 2023-11-22

## 2023-11-09 RX ORDER — SODIUM CHLORIDE 9 MG/ML
5-250 INJECTION, SOLUTION INTRAVENOUS PRN
OUTPATIENT
Start: 2023-11-29

## 2023-11-09 RX ORDER — ALBUTEROL SULFATE 90 UG/1
4 AEROSOL, METERED RESPIRATORY (INHALATION) PRN
OUTPATIENT
Start: 2023-11-15

## 2023-11-09 RX ORDER — FAMOTIDINE 10 MG/ML
20 INJECTION, SOLUTION INTRAVENOUS ONCE
OUTPATIENT
Start: 2023-11-22 | End: 2023-11-22

## 2023-11-09 RX ORDER — SODIUM CHLORIDE 9 MG/ML
5-250 INJECTION, SOLUTION INTRAVENOUS PRN
OUTPATIENT
Start: 2023-11-22

## 2023-11-09 RX ORDER — SODIUM CHLORIDE 9 MG/ML
5-250 INJECTION, SOLUTION INTRAVENOUS PRN
OUTPATIENT
Start: 2023-11-15

## 2023-11-09 RX ORDER — EPINEPHRINE 1 MG/ML
0.3 INJECTION, SOLUTION, CONCENTRATE INTRAVENOUS PRN
OUTPATIENT
Start: 2023-11-22

## 2023-11-09 RX ORDER — SODIUM CHLORIDE 9 MG/ML
INJECTION, SOLUTION INTRAVENOUS CONTINUOUS
OUTPATIENT
Start: 2023-11-29

## 2023-11-09 RX ORDER — HEPARIN SODIUM (PORCINE) LOCK FLUSH IV SOLN 100 UNIT/ML 100 UNIT/ML
500 SOLUTION INTRAVENOUS PRN
OUTPATIENT
Start: 2023-11-15

## 2023-11-09 RX ORDER — SODIUM CHLORIDE 0.9 % (FLUSH) 0.9 %
5-40 SYRINGE (ML) INJECTION PRN
OUTPATIENT
Start: 2023-11-22

## 2023-11-09 RX ORDER — DIPHENHYDRAMINE HCL 25 MG
50 TABLET ORAL ONCE
OUTPATIENT
Start: 2023-11-22 | End: 2023-11-22

## 2023-11-09 RX ORDER — MEPERIDINE HYDROCHLORIDE 50 MG/ML
12.5 INJECTION INTRAMUSCULAR; INTRAVENOUS; SUBCUTANEOUS PRN
OUTPATIENT
Start: 2023-11-22

## 2023-11-09 RX ORDER — DIPHENHYDRAMINE HCL 25 MG
50 TABLET ORAL ONCE
OUTPATIENT
Start: 2023-11-15 | End: 2023-11-15

## 2023-11-09 RX ORDER — FAMOTIDINE 10 MG/ML
20 INJECTION, SOLUTION INTRAVENOUS
OUTPATIENT
Start: 2023-11-15

## 2023-11-09 RX ORDER — SODIUM CHLORIDE 9 MG/ML
INJECTION, SOLUTION INTRAVENOUS CONTINUOUS
OUTPATIENT
Start: 2023-11-22

## 2023-11-09 RX ORDER — EPINEPHRINE 1 MG/ML
0.3 INJECTION, SOLUTION, CONCENTRATE INTRAVENOUS PRN
OUTPATIENT
Start: 2023-11-15

## 2023-11-09 RX ORDER — DIPHENHYDRAMINE HYDROCHLORIDE 50 MG/ML
50 INJECTION INTRAMUSCULAR; INTRAVENOUS
OUTPATIENT
Start: 2023-11-29

## 2023-11-09 RX ORDER — MEPERIDINE HYDROCHLORIDE 50 MG/ML
12.5 INJECTION INTRAMUSCULAR; INTRAVENOUS; SUBCUTANEOUS PRN
OUTPATIENT
Start: 2023-11-15

## 2023-11-09 RX ORDER — FAMOTIDINE 10 MG/ML
20 INJECTION, SOLUTION INTRAVENOUS
OUTPATIENT
Start: 2023-11-29

## 2023-11-09 RX ORDER — SODIUM CHLORIDE 0.9 % (FLUSH) 0.9 %
5-40 SYRINGE (ML) INJECTION PRN
OUTPATIENT
Start: 2023-11-15

## 2023-11-09 RX ORDER — DIPHENHYDRAMINE HYDROCHLORIDE 50 MG/ML
50 INJECTION INTRAMUSCULAR; INTRAVENOUS
OUTPATIENT
Start: 2023-11-22

## 2023-11-09 RX ORDER — PROCHLORPERAZINE EDISYLATE 5 MG/ML
10 INJECTION INTRAMUSCULAR; INTRAVENOUS
OUTPATIENT
Start: 2023-11-22

## 2023-11-09 RX ORDER — SODIUM CHLORIDE 0.9 % (FLUSH) 0.9 %
5-40 SYRINGE (ML) INJECTION PRN
OUTPATIENT
Start: 2023-11-29

## 2023-11-09 RX ORDER — ONDANSETRON 2 MG/ML
8 INJECTION INTRAMUSCULAR; INTRAVENOUS
OUTPATIENT
Start: 2023-11-22

## 2023-11-09 RX ORDER — ACETAMINOPHEN 325 MG/1
650 TABLET ORAL
OUTPATIENT
Start: 2023-11-22

## 2023-11-09 RX ORDER — MEPERIDINE HYDROCHLORIDE 50 MG/ML
12.5 INJECTION INTRAMUSCULAR; INTRAVENOUS; SUBCUTANEOUS PRN
OUTPATIENT
Start: 2023-11-29

## 2023-11-09 RX ORDER — EPINEPHRINE 1 MG/ML
0.3 INJECTION, SOLUTION, CONCENTRATE INTRAVENOUS PRN
OUTPATIENT
Start: 2023-11-29

## 2023-11-09 RX ORDER — FAMOTIDINE 10 MG/ML
20 INJECTION, SOLUTION INTRAVENOUS
OUTPATIENT
Start: 2023-11-22

## 2023-11-09 RX ORDER — PROCHLORPERAZINE EDISYLATE 5 MG/ML
10 INJECTION INTRAMUSCULAR; INTRAVENOUS
OUTPATIENT
Start: 2023-11-15

## 2023-11-09 RX ORDER — FAMOTIDINE 10 MG/ML
20 INJECTION, SOLUTION INTRAVENOUS ONCE
OUTPATIENT
Start: 2023-11-15 | End: 2023-11-15

## 2023-11-09 RX ORDER — ONDANSETRON 2 MG/ML
8 INJECTION INTRAMUSCULAR; INTRAVENOUS
OUTPATIENT
Start: 2023-11-15

## 2023-11-09 RX ORDER — PROCHLORPERAZINE EDISYLATE 5 MG/ML
10 INJECTION INTRAMUSCULAR; INTRAVENOUS
OUTPATIENT
Start: 2023-11-29

## 2023-11-09 RX ORDER — HEPARIN SODIUM (PORCINE) LOCK FLUSH IV SOLN 100 UNIT/ML 100 UNIT/ML
500 SOLUTION INTRAVENOUS PRN
OUTPATIENT
Start: 2023-11-29

## 2023-11-09 RX ORDER — DIPHENHYDRAMINE HCL 25 MG
50 TABLET ORAL ONCE
OUTPATIENT
Start: 2023-11-29 | End: 2023-11-29

## 2023-11-09 RX ORDER — ONDANSETRON 2 MG/ML
8 INJECTION INTRAMUSCULAR; INTRAVENOUS
OUTPATIENT
Start: 2023-11-29

## 2023-11-09 RX ORDER — DIPHENHYDRAMINE HYDROCHLORIDE 50 MG/ML
50 INJECTION INTRAMUSCULAR; INTRAVENOUS
OUTPATIENT
Start: 2023-11-15

## 2023-11-09 RX ORDER — ACETAMINOPHEN 325 MG/1
650 TABLET ORAL
OUTPATIENT
Start: 2023-11-29

## 2023-11-13 ENCOUNTER — OFFICE VISIT (OUTPATIENT)
Age: 53
End: 2023-11-13
Payer: COMMERCIAL

## 2023-11-13 ENCOUNTER — CLINICAL DOCUMENTATION (OUTPATIENT)
Age: 53
End: 2023-11-13

## 2023-11-13 DIAGNOSIS — C50.912 BREAST CANCER, STAGE 2, LEFT (HCC): Primary | ICD-10-CM

## 2023-11-13 DIAGNOSIS — C50.912 INVASIVE DUCTAL CARCINOMA OF BREAST, FEMALE, LEFT (HCC): ICD-10-CM

## 2023-11-13 PROCEDURE — 76642 ULTRASOUND BREAST LIMITED: CPT | Performed by: SURGERY

## 2023-11-13 PROCEDURE — 99215 OFFICE O/P EST HI 40 MIN: CPT | Performed by: SURGERY

## 2023-11-13 ASSESSMENT — ENCOUNTER SYMPTOMS: NAUSEA: 1

## 2023-11-13 NOTE — PROGRESS NOTES
For Pharmacy Admin Tracking Only    Program: Medical Group  CPA in place:  Yes  Recommendation Provided To: Patient/Caregiver: 1 via [employer] Message  Intervention Detail: Adherence Monitorin  Intervention Accepted By: Patient/Caregiver: 1  Time Spent (min): 10

## 2023-11-13 NOTE — PROGRESS NOTES
HISTORY OF PRESENT ILLNESS  Devante Goldman is a 47 y.o. female     HPI ESTABLISHED patient here for mid point chemotherapy evaluation and to discuss surgery for LEFT breast cancer. Projected to finish NAC on 01/31/24. Her appetite has been poor on chemo and complains of weight loss. She does feel like the mass is getting smaller. Denies any other breast changes. Takes Elequis for RIGHT subclavian DVT. Scheduled for nipple delays on 02/22/24 and BILATERAL mastectomies with reconstruction by Dr. Raimundo Ruiz on 03/07/24. Review of Systems   Constitutional:  Positive for appetite change, fatigue and unexpected weight change. Gastrointestinal:  Positive for nausea.          Physical Exam       ASSESSMENT and PLAN  {Assessment and Plan Chronic Disease:[id number]}
surgery       ASSESSMENT and PLAN   Diagnosis Orders   1. Breast cancer, stage 2, left ([address])        2. Invasive ductal carcinoma of breast, female, left [facility])          Patient presents to follow up on mid point chemotherapy evaluation and to discuss surgery for LEFT breast cancer. She is doing well overall. Physical examination noted nothing palpable. Ultrasound of the LEFT breast visualized a small, 9 mm mass in the lower outer quadrant, and biopsy clip in LEFT axilla. We discussed the procedure for her LEFT breast surgery. Patient is already scheduled for surgery. This plan was reviewed with the patient and patient agrees. All questions were answered. Total time spent excluding procedural time was 40 minutes.     Written by Emily Osorio, as dictated by Dr. Renny Caraballo MD.

## 2023-11-14 ENCOUNTER — TELEPHONE (OUTPATIENT)
Age: 53
End: 2023-11-14

## 2023-11-15 ENCOUNTER — RESEARCH ENCOUNTER (OUTPATIENT)
Age: 53
End: 2023-11-15

## 2023-11-15 ENCOUNTER — OFFICE VISIT (OUTPATIENT)
Age: 53
End: 2023-11-15

## 2023-11-15 ENCOUNTER — HOSPITAL ENCOUNTER (OUTPATIENT)
Facility: HOSPITAL | Age: 53
Setting detail: INFUSION SERIES
Discharge: HOME | End: 2023-11-15
Payer: COMMERCIAL

## 2023-11-15 VITALS
DIASTOLIC BLOOD PRESSURE: 62 MMHG | SYSTOLIC BLOOD PRESSURE: 99 MMHG | TEMPERATURE: 97.8 F | OXYGEN SATURATION: 100 % | HEIGHT: 64 IN | WEIGHT: 99.8 LBS | HEART RATE: 74 BPM | BODY MASS INDEX: 17.04 KG/M2 | RESPIRATION RATE: 17 BRPM

## 2023-11-15 VITALS
TEMPERATURE: 97.8 F | WEIGHT: 99 LBS | BODY MASS INDEX: 16.99 KG/M2 | RESPIRATION RATE: 16 BRPM | SYSTOLIC BLOOD PRESSURE: 116 MMHG | OXYGEN SATURATION: 100 % | DIASTOLIC BLOOD PRESSURE: 58 MMHG | HEART RATE: 87 BPM

## 2023-11-15 DIAGNOSIS — C50.912 INVASIVE DUCTAL CARCINOMA OF BREAST, FEMALE, LEFT (HCC): ICD-10-CM

## 2023-11-15 DIAGNOSIS — C50.112 MALIGNANT NEOPLASM OF CENTRAL PORTION OF LEFT BREAST IN FEMALE, ESTROGEN RECEPTOR POSITIVE (HCC): Primary | ICD-10-CM

## 2023-11-15 DIAGNOSIS — Z17.0 MALIGNANT NEOPLASM OF CENTRAL PORTION OF LEFT BREAST IN FEMALE, ESTROGEN RECEPTOR POSITIVE (HCC): Primary | ICD-10-CM

## 2023-11-15 DIAGNOSIS — Z76.89 PREVENTION OF CHEMOTHERAPY-INDUCED NEUTROPENIA: ICD-10-CM

## 2023-11-15 DIAGNOSIS — Z51.11 ENCOUNTER FOR ANTINEOPLASTIC CHEMOTHERAPY: Primary | ICD-10-CM

## 2023-11-15 LAB
ALBUMIN SERPL-MCNC: 3.3 G/DL (ref 3.5–5)
ALBUMIN/GLOB SERPL: 1.1 (ref 1.1–2.2)
ALP SERPL-CCNC: 54 U/L (ref 45–117)
ALT SERPL-CCNC: 20 U/L (ref 12–78)
ANION GAP SERPL CALC-SCNC: 5 MMOL/L (ref 5–15)
AST SERPL-CCNC: 14 U/L (ref 15–37)
BASOPHILS # BLD: 0 K/UL (ref 0–0.1)
BASOPHILS NFR BLD: 0 % (ref 0–1)
BILIRUB SERPL-MCNC: 0.2 MG/DL (ref 0.2–1)
BUN SERPL-MCNC: 10 MG/DL (ref 6–20)
BUN/CREAT SERPL: 20 (ref 12–20)
CALCIUM SERPL-MCNC: 8.5 MG/DL (ref 8.5–10.1)
CHLORIDE SERPL-SCNC: 109 MMOL/L (ref 97–108)
CO2 SERPL-SCNC: 29 MMOL/L (ref 21–32)
CREAT SERPL-MCNC: 0.51 MG/DL (ref 0.55–1.02)
DIFFERENTIAL METHOD BLD: ABNORMAL
EOSINOPHIL # BLD: 0 K/UL (ref 0–0.4)
EOSINOPHIL NFR BLD: 0 % (ref 0–7)
ERYTHROCYTE [DISTWIDTH] IN BLOOD BY AUTOMATED COUNT: 16.8 % (ref 11.5–14.5)
GLOBULIN SER CALC-MCNC: 3 G/DL (ref 2–4)
GLUCOSE SERPL-MCNC: 115 MG/DL (ref 65–100)
HCT VFR BLD AUTO: 22.1 % (ref 35–47)
HGB BLD-MCNC: 7.2 G/DL (ref 11.5–16)
IMM GRANULOCYTES # BLD AUTO: 0 K/UL (ref 0–0.04)
IMM GRANULOCYTES NFR BLD AUTO: 0 % (ref 0–0.5)
LYMPHOCYTES # BLD: 0.4 K/UL (ref 0.8–3.5)
LYMPHOCYTES NFR BLD: 22 % (ref 12–49)
MCH RBC QN AUTO: 30.1 PG (ref 26–34)
MCHC RBC AUTO-ENTMCNC: 32.6 G/DL (ref 30–36.5)
MCV RBC AUTO: 92.5 FL (ref 80–99)
MONOCYTES # BLD: 0.4 K/UL (ref 0–1)
MONOCYTES NFR BLD: 24 % (ref 5–13)
NEUTS BAND NFR BLD MANUAL: 3 %
NEUTS SEG # BLD: 0.9 K/UL (ref 1.8–8)
NEUTS SEG NFR BLD: 51 % (ref 32–75)
NRBC # BLD: 0 K/UL (ref 0–0.01)
NRBC BLD-RTO: 0 PER 100 WBC
PLATELET # BLD AUTO: 106 K/UL (ref 150–400)
PMV BLD AUTO: 10.3 FL (ref 8.9–12.9)
POTASSIUM SERPL-SCNC: 3.3 MMOL/L (ref 3.5–5.1)
PROT SERPL-MCNC: 6.3 G/DL (ref 6.4–8.2)
RBC # BLD AUTO: 2.39 M/UL (ref 3.8–5.2)
RBC MORPH BLD: ABNORMAL
SODIUM SERPL-SCNC: 143 MMOL/L (ref 136–145)
WBC # BLD AUTO: 1.7 K/UL (ref 3.6–11)
WBC MORPH BLD: ABNORMAL

## 2023-11-15 PROCEDURE — 96417 CHEMO IV INFUS EACH ADDL SEQ: CPT

## 2023-11-15 PROCEDURE — 2500000003 HC RX 250 WO HCPCS: Performed by: INTERNAL MEDICINE

## 2023-11-15 PROCEDURE — 96415 CHEMO IV INFUSION ADDL HR: CPT

## 2023-11-15 PROCEDURE — 2580000003 HC RX 258: Performed by: INTERNAL MEDICINE

## 2023-11-15 PROCEDURE — 80053 COMPREHEN METABOLIC PANEL: CPT

## 2023-11-15 PROCEDURE — 6370000000 HC RX 637 (ALT 250 FOR IP): Performed by: NURSE PRACTITIONER

## 2023-11-15 PROCEDURE — 85025 COMPLETE CBC W/AUTO DIFF WBC: CPT

## 2023-11-15 PROCEDURE — 96413 CHEMO IV INFUSION 1 HR: CPT

## 2023-11-15 RX ORDER — POTASSIUM CHLORIDE 750 MG/1
40 TABLET, EXTENDED RELEASE ORAL ONCE
Status: COMPLETED | OUTPATIENT
Start: 2023-11-15 | End: 2023-11-15

## 2023-11-15 RX ORDER — ACETAMINOPHEN 325 MG/1
650 TABLET ORAL
Status: DISCONTINUED | OUTPATIENT
Start: 2023-11-15 | End: 2023-11-16 | Stop reason: HOSPADM

## 2023-11-15 RX ORDER — ONDANSETRON 2 MG/ML
8 INJECTION INTRAMUSCULAR; INTRAVENOUS
Status: DISCONTINUED | OUTPATIENT
Start: 2023-11-15 | End: 2023-11-16 | Stop reason: HOSPADM

## 2023-11-15 RX ORDER — DIPHENHYDRAMINE HYDROCHLORIDE 50 MG/ML
50 INJECTION INTRAMUSCULAR; INTRAVENOUS
Status: DISCONTINUED | OUTPATIENT
Start: 2023-11-15 | End: 2023-11-16 | Stop reason: HOSPADM

## 2023-11-15 RX ORDER — SODIUM CHLORIDE 0.9 % (FLUSH) 0.9 %
5-40 SYRINGE (ML) INJECTION PRN
Status: DISCONTINUED | OUTPATIENT
Start: 2023-11-15 | End: 2023-11-16 | Stop reason: HOSPADM

## 2023-11-15 RX ORDER — MEPERIDINE HYDROCHLORIDE 25 MG/ML
12.5 INJECTION INTRAMUSCULAR; INTRAVENOUS; SUBCUTANEOUS PRN
Status: DISCONTINUED | OUTPATIENT
Start: 2023-11-15 | End: 2023-11-16 | Stop reason: HOSPADM

## 2023-11-15 RX ORDER — SODIUM CHLORIDE 9 MG/ML
5-250 INJECTION, SOLUTION INTRAVENOUS PRN
Status: DISCONTINUED | OUTPATIENT
Start: 2023-11-15 | End: 2023-11-16 | Stop reason: HOSPADM

## 2023-11-15 RX ORDER — ALBUTEROL SULFATE 90 UG/1
4 AEROSOL, METERED RESPIRATORY (INHALATION) PRN
Status: DISCONTINUED | OUTPATIENT
Start: 2023-11-15 | End: 2023-11-16 | Stop reason: HOSPADM

## 2023-11-15 RX ORDER — EPINEPHRINE 1 MG/ML
0.3 INJECTION, SOLUTION, CONCENTRATE INTRAVENOUS PRN
Status: DISCONTINUED | OUTPATIENT
Start: 2023-11-15 | End: 2023-11-16 | Stop reason: HOSPADM

## 2023-11-15 RX ORDER — SODIUM CHLORIDE 9 MG/ML
INJECTION, SOLUTION INTRAVENOUS CONTINUOUS
Status: DISCONTINUED | OUTPATIENT
Start: 2023-11-15 | End: 2023-11-16 | Stop reason: HOSPADM

## 2023-11-15 RX ADMIN — Medication 840 MG: at 13:24

## 2023-11-15 RX ADMIN — SODIUM CHLORIDE 25 ML/HR: 9 INJECTION, SOLUTION INTRAVENOUS at 11:15

## 2023-11-15 RX ADMIN — POTASSIUM CHLORIDE 40 MEQ: 750 TABLET, EXTENDED RELEASE ORAL at 11:11

## 2023-11-15 RX ADMIN — Medication 360 MG: at 11:22

## 2023-11-15 RX ADMIN — SODIUM CHLORIDE, PRESERVATIVE FREE 20 ML: 5 INJECTION INTRAVENOUS at 14:39

## 2023-11-15 ASSESSMENT — PAIN SCALES - GENERAL: PAINLEVEL_OUTOF10: 0

## 2023-11-15 NOTE — PROGRESS NOTES
Patient in today for labs and follow-up visit with Dr. Daniel Churchill. Today is C1D1 (THP) on study FB-12. Patient reports the following adverse events: gr 1 loss of appetite, gr 1 constipation, gr 1 fatigue, gr 1 hair loss, gr 1 chills, gr 1 hot flashes, gr 1 insomnia, gr 1 anxiety/agitation, gr 1 mouth sores, gr 1 sob, gr 1 headache, gr 1 proctitis, gr 1 loss of libido. Vital signs are stable. Patient to receive trastuzumab, pertuzumab only. Next appt is 11/22/23.

## 2023-11-16 ENCOUNTER — TELEPHONE (OUTPATIENT)
Age: 53
End: 2023-11-16

## 2023-11-16 ENCOUNTER — PATIENT MESSAGE (OUTPATIENT)
Age: 53
End: 2023-11-16

## 2023-11-16 ENCOUNTER — PREP FOR PROCEDURE (OUTPATIENT)
Age: 53
End: 2023-11-16

## 2023-11-16 DIAGNOSIS — K52.1 CHEMOTHERAPY INDUCED DIARRHEA: Primary | ICD-10-CM

## 2023-11-16 DIAGNOSIS — C50.912 INVASIVE DUCTAL CARCINOMA OF BREAST, STAGE 2, LEFT (HCC): Primary | ICD-10-CM

## 2023-11-16 DIAGNOSIS — T45.1X5A CHEMOTHERAPY INDUCED DIARRHEA: Primary | ICD-10-CM

## 2023-11-16 NOTE — TELEPHONE ENCOUNTER
Patient called. Her chemo got extended and now completes this on 02/07/24. She has surgery scheduled for 02/22/24. She wants to know if that's okay with Dr. Javed Sanchez. She also reached out to Dr. Edel Muller office regarding this. Will check with Dr. Javed Sanchez and office will get back with patient.

## 2023-11-20 ENCOUNTER — TELEPHONE (OUTPATIENT)
Age: 53
End: 2023-11-20

## 2023-11-20 NOTE — TELEPHONE ENCOUNTER
11/20/23 12:23 p.m.- Returned patient's call. Patient has some questions about taking Imodium. Patient has been having episodes of diarrhea on and off for the last couple days and wanted to clarify dosing of Imodium and how long she should take it. Advised patient of Imodium dosing and that she could continue taking the Imodium as needed for diarrhea throughout the course of her chemotherapy. Patient denies any nausea, vomiting, or fever and states that she is trying to increase her fluid intake. Advised patient that if Imodium wasn't controlling the diarrhea then to let our office know. Patient verbalizes understanding and denies any further questions at this time.

## 2023-11-20 NOTE — TELEPHONE ENCOUNTER
Patient called and stated that she has some questions on whether she can take imodium. Requested a call back to discuss.        # [phone]

## 2023-11-21 ENCOUNTER — TELEPHONE (OUTPATIENT)
Age: 53
End: 2023-11-21

## 2023-11-21 NOTE — TELEPHONE ENCOUNTER
Rogelio Reynolds at [facility]  [phone]    11/21/23 9:44 AM EST - Received call from [facility] with the pre-certification/authorization team.  She inquired if the patient truly needs the echo scheduled for 11/27/23 since she just had one on 11/9/23. Advised, per NP and research team, the echo on 11/27 is not necessary at this time. Advised she does not need another echo until 2/1/24. [facility] voiced understanding and gratitude for the call. She is taking the patient off of the schedule. Will call and update patient of above. No further questions or concerns. Rogelio Reynolds at [facility]  [phone]    11/21/23 9:55 AM EST - Called and spoke with the patient. Patient's ID verified x 2. Advised the echo appointment for 11/27 is not needed. Advised this has been canceled. Advised, per NP, she will not need another echo until 12 weeks after 11/9/23. Advised this would be 2/1/24. Provided the number to [facility] for the patient to call and set up her appointment for 2/1/24. The patient voiced understanding and gratitude for the call. No further questions or concerns.

## 2023-11-22 ENCOUNTER — RESEARCH ENCOUNTER (OUTPATIENT)
Age: 53
End: 2023-11-22

## 2023-11-22 ENCOUNTER — HOSPITAL ENCOUNTER (OUTPATIENT)
Facility: HOSPITAL | Age: 53
Setting detail: INFUSION SERIES
Discharge: HOME | End: 2023-11-22
Payer: COMMERCIAL

## 2023-11-22 VITALS
DIASTOLIC BLOOD PRESSURE: 91 MMHG | BODY MASS INDEX: 16.39 KG/M2 | RESPIRATION RATE: 18 BRPM | WEIGHT: 96 LBS | HEART RATE: 74 BPM | TEMPERATURE: 97.9 F | SYSTOLIC BLOOD PRESSURE: 129 MMHG | OXYGEN SATURATION: 98 % | HEIGHT: 64 IN

## 2023-11-22 DIAGNOSIS — C50.912 INVASIVE DUCTAL CARCINOMA OF BREAST, FEMALE, LEFT (HCC): ICD-10-CM

## 2023-11-22 DIAGNOSIS — Z51.11 ENCOUNTER FOR ANTINEOPLASTIC CHEMOTHERAPY: Primary | ICD-10-CM

## 2023-11-22 DIAGNOSIS — Z76.89 PREVENTION OF CHEMOTHERAPY-INDUCED NEUTROPENIA: ICD-10-CM

## 2023-11-22 LAB
ALBUMIN SERPL-MCNC: 3.6 G/DL (ref 3.5–5)
ALBUMIN/GLOB SERPL: 1.2 (ref 1.1–2.2)
ALP SERPL-CCNC: 47 U/L (ref 45–117)
ALT SERPL-CCNC: 37 U/L (ref 12–78)
ANION GAP SERPL CALC-SCNC: 3 MMOL/L (ref 5–15)
AST SERPL-CCNC: 36 U/L (ref 15–37)
BASOPHILS # BLD: 0 K/UL (ref 0–0.1)
BASOPHILS NFR BLD: 1 % (ref 0–1)
BILIRUB SERPL-MCNC: 0.6 MG/DL (ref 0.2–1)
BUN SERPL-MCNC: 15 MG/DL (ref 6–20)
BUN/CREAT SERPL: 33 (ref 12–20)
CALCIUM SERPL-MCNC: 8.6 MG/DL (ref 8.5–10.1)
CHLORIDE SERPL-SCNC: 108 MMOL/L (ref 97–108)
CO2 SERPL-SCNC: 30 MMOL/L (ref 21–32)
CREAT SERPL-MCNC: 0.45 MG/DL (ref 0.55–1.02)
DIFFERENTIAL METHOD BLD: ABNORMAL
EOSINOPHIL # BLD: 0 K/UL (ref 0–0.4)
EOSINOPHIL NFR BLD: 0 % (ref 0–7)
ERYTHROCYTE [DISTWIDTH] IN BLOOD BY AUTOMATED COUNT: 19.7 % (ref 11.5–14.5)
GLOBULIN SER CALC-MCNC: 3 G/DL (ref 2–4)
GLUCOSE SERPL-MCNC: 101 MG/DL (ref 65–100)
HCT VFR BLD AUTO: 26.3 % (ref 35–47)
HGB BLD-MCNC: 8.5 G/DL (ref 11.5–16)
IMM GRANULOCYTES # BLD AUTO: 0 K/UL (ref 0–0.04)
IMM GRANULOCYTES NFR BLD AUTO: 1 % (ref 0–0.5)
LYMPHOCYTES # BLD: 0.4 K/UL (ref 0.8–3.5)
LYMPHOCYTES NFR BLD: 22 % (ref 12–49)
MCH RBC QN AUTO: 30.4 PG (ref 26–34)
MCHC RBC AUTO-ENTMCNC: 32.3 G/DL (ref 30–36.5)
MCV RBC AUTO: 93.9 FL (ref 80–99)
MONOCYTES # BLD: 0.4 K/UL (ref 0–1)
MONOCYTES NFR BLD: 22 % (ref 5–13)
NEUTS SEG # BLD: 1.1 K/UL (ref 1.8–8)
NEUTS SEG NFR BLD: 54 % (ref 32–75)
NRBC # BLD: 0 K/UL (ref 0–0.01)
NRBC BLD-RTO: 0 PER 100 WBC
PLATELET # BLD AUTO: 240 K/UL (ref 150–400)
PMV BLD AUTO: 8.8 FL (ref 8.9–12.9)
POTASSIUM SERPL-SCNC: 3.3 MMOL/L (ref 3.5–5.1)
PROT SERPL-MCNC: 6.6 G/DL (ref 6.4–8.2)
RBC # BLD AUTO: 2.8 M/UL (ref 3.8–5.2)
RBC MORPH BLD: ABNORMAL
RBC MORPH BLD: ABNORMAL
SODIUM SERPL-SCNC: 141 MMOL/L (ref 136–145)
WBC # BLD AUTO: 1.9 K/UL (ref 3.6–11)

## 2023-11-22 PROCEDURE — 85025 COMPLETE CBC W/AUTO DIFF WBC: CPT

## 2023-11-22 PROCEDURE — 80053 COMPREHEN METABOLIC PANEL: CPT

## 2023-11-22 PROCEDURE — 6370000000 HC RX 637 (ALT 250 FOR IP): Performed by: NURSE PRACTITIONER

## 2023-11-22 PROCEDURE — 6370000000 HC RX 637 (ALT 250 FOR IP): Performed by: INTERNAL MEDICINE

## 2023-11-22 PROCEDURE — 96375 TX/PRO/DX INJ NEW DRUG ADDON: CPT

## 2023-11-22 PROCEDURE — 96413 CHEMO IV INFUSION 1 HR: CPT

## 2023-11-22 PROCEDURE — 2580000003 HC RX 258: Performed by: INTERNAL MEDICINE

## 2023-11-22 PROCEDURE — 2500000003 HC RX 250 WO HCPCS: Performed by: INTERNAL MEDICINE

## 2023-11-22 PROCEDURE — 96415 CHEMO IV INFUSION ADDL HR: CPT

## 2023-11-22 PROCEDURE — 6360000002 HC RX W HCPCS: Performed by: NURSE PRACTITIONER

## 2023-11-22 PROCEDURE — 6360000002 HC RX W HCPCS: Performed by: INTERNAL MEDICINE

## 2023-11-22 RX ORDER — POTASSIUM CHLORIDE 750 MG/1
40 TABLET, EXTENDED RELEASE ORAL ONCE
Status: COMPLETED | OUTPATIENT
Start: 2023-11-22 | End: 2023-11-22

## 2023-11-22 RX ORDER — EPINEPHRINE 1 MG/ML
0.3 INJECTION, SOLUTION, CONCENTRATE INTRAVENOUS PRN
Status: DISCONTINUED | OUTPATIENT
Start: 2023-11-22 | End: 2023-11-23 | Stop reason: HOSPADM

## 2023-11-22 RX ORDER — SODIUM CHLORIDE 0.9 % (FLUSH) 0.9 %
5-40 SYRINGE (ML) INJECTION PRN
Status: DISCONTINUED | OUTPATIENT
Start: 2023-11-22 | End: 2023-11-23 | Stop reason: HOSPADM

## 2023-11-22 RX ORDER — DEXAMETHASONE SODIUM PHOSPHATE 10 MG/ML
10 INJECTION INTRAMUSCULAR; INTRAVENOUS ONCE
Status: COMPLETED | OUTPATIENT
Start: 2023-11-22 | End: 2023-11-22

## 2023-11-22 RX ORDER — ALBUTEROL SULFATE 90 UG/1
4 AEROSOL, METERED RESPIRATORY (INHALATION) PRN
Status: DISCONTINUED | OUTPATIENT
Start: 2023-11-22 | End: 2023-11-23 | Stop reason: HOSPADM

## 2023-11-22 RX ORDER — MEPERIDINE HYDROCHLORIDE 25 MG/ML
12.5 INJECTION INTRAMUSCULAR; INTRAVENOUS; SUBCUTANEOUS PRN
Status: DISCONTINUED | OUTPATIENT
Start: 2023-11-22 | End: 2023-11-23 | Stop reason: HOSPADM

## 2023-11-22 RX ORDER — ACETAMINOPHEN 325 MG/1
650 TABLET ORAL
Status: DISCONTINUED | OUTPATIENT
Start: 2023-11-22 | End: 2023-11-23 | Stop reason: HOSPADM

## 2023-11-22 RX ORDER — SODIUM CHLORIDE 9 MG/ML
5-250 INJECTION, SOLUTION INTRAVENOUS PRN
Status: DISCONTINUED | OUTPATIENT
Start: 2023-11-22 | End: 2023-11-23 | Stop reason: HOSPADM

## 2023-11-22 RX ORDER — ONDANSETRON 2 MG/ML
8 INJECTION INTRAMUSCULAR; INTRAVENOUS
Status: DISCONTINUED | OUTPATIENT
Start: 2023-11-22 | End: 2023-11-23 | Stop reason: HOSPADM

## 2023-11-22 RX ORDER — DIPHENHYDRAMINE HYDROCHLORIDE 50 MG/ML
50 INJECTION INTRAMUSCULAR; INTRAVENOUS
Status: DISCONTINUED | OUTPATIENT
Start: 2023-11-22 | End: 2023-11-23 | Stop reason: HOSPADM

## 2023-11-22 RX ORDER — PROCHLORPERAZINE EDISYLATE 5 MG/ML
10 INJECTION INTRAMUSCULAR; INTRAVENOUS
Status: COMPLETED | OUTPATIENT
Start: 2023-11-22 | End: 2023-11-22

## 2023-11-22 RX ORDER — DIPHENHYDRAMINE HCL 25 MG
50 CAPSULE ORAL ONCE
Status: COMPLETED | OUTPATIENT
Start: 2023-11-22 | End: 2023-11-22

## 2023-11-22 RX ORDER — SODIUM CHLORIDE 9 MG/ML
INJECTION, SOLUTION INTRAVENOUS CONTINUOUS
Status: DISCONTINUED | OUTPATIENT
Start: 2023-11-22 | End: 2023-11-23 | Stop reason: HOSPADM

## 2023-11-22 RX ORDER — LORAZEPAM 2 MG/ML
0.5 INJECTION INTRAMUSCULAR ONCE
Status: COMPLETED | OUTPATIENT
Start: 2023-11-22 | End: 2023-11-22

## 2023-11-22 RX ADMIN — DEXAMETHASONE SODIUM PHOSPHATE 10 MG: 10 INJECTION INTRAMUSCULAR; INTRAVENOUS at 10:24

## 2023-11-22 RX ADMIN — LORAZEPAM 0.5 MG: 2 INJECTION INTRAMUSCULAR; INTRAVENOUS at 11:37

## 2023-11-22 RX ADMIN — POTASSIUM CHLORIDE 40 MEQ: 750 TABLET, EXTENDED RELEASE ORAL at 10:32

## 2023-11-22 RX ADMIN — PACLITAXEL 115 MG: 6 INJECTION, SOLUTION INTRAVENOUS at 11:04

## 2023-11-22 RX ADMIN — SODIUM CHLORIDE 25 ML/HR: 9 INJECTION, SOLUTION INTRAVENOUS at 09:54

## 2023-11-22 RX ADMIN — PROCHLORPERAZINE EDISYLATE 10 MG: 5 INJECTION, SOLUTION INTRAMUSCULAR; INTRAVENOUS at 11:15

## 2023-11-22 RX ADMIN — DIPHENHYDRAMINE HYDROCHLORIDE 50 MG: 25 CAPSULE ORAL at 10:18

## 2023-11-22 RX ADMIN — SODIUM CHLORIDE, PRESERVATIVE FREE 20 ML: 5 INJECTION INTRAVENOUS at 14:22

## 2023-11-22 RX ADMIN — SODIUM CHLORIDE, PRESERVATIVE FREE 20 MG: 5 INJECTION INTRAVENOUS at 10:21

## 2023-11-22 ASSESSMENT — PAIN SCALES - GENERAL: PAINLEVEL_OUTOF10: 0

## 2023-11-22 NOTE — PROGRESS NOTES
Cold cap therapy continued during Taxol infusion(s). Post infusion cooling time: Cold cap therapy continued 90 minutes post  infusion. Two nurses verified prior to administering:  drug name, drug dose, infusion volume or drug volume when prepared in a syringe, rate of administration, route of administration, expiration dates and/or times, appearance and physical integrity of the drugs, rate set on infusion pump, when used, sequencing of drug administration    Pt tolerated treatment well. PAC maintained positive blood return throughout treatment, flushed with positive blood return at conclusion. D/c home in no distress. Pt aware of next appointment scheduled for 11/29.     Labs  Recent Results (from the past 12 hour(s))   CBC With Auto Differential    Collection Time: 11/22/23  8:37 AM   Result Value Ref Range    WBC 1.9 (L) 3.6 - 11.0 K/uL    RBC 2.80 (L) 3.80 - 5.20 M/uL    Hemoglobin 8.5 (L) 11.5 - 16.0 g/dL    Hematocrit 26.3 (L) 35.0 - 47.0 %    MCV 93.9 80.0 - 99.0 FL    MCH 30.4 26.0 - 34.0 PG    MCHC 32.3 30.0 - 36.5 g/dL    RDW 19.7 (H) 11.5 - 14.5 %    Platelets 875 719 - 519 K/uL    MPV 8.8 (L) 8.9 - 12.9 FL    Nucleated RBCs 0.0 0  WBC    nRBC 0.00 0.00 - 0.01 K/uL    Neutrophils % 54 32 - 75 %    Lymphocytes % 22 12 - 49 %    Monocytes % 22 (H) 5 - 13 %    Eosinophils % 0 0 - 7 %    Basophils % 1 0 - 1 %    Immature Granulocytes 1 (H) 0.0 - 0.5 %    Neutrophils Absolute 1.1 (L) 1.8 - 8.0 K/UL    Lymphocytes Absolute 0.4 (L) 0.8 - 3.5 K/UL    Monocytes Absolute 0.4 0.0 - 1.0 K/UL    Eosinophils Absolute 0.0 0.0 - 0.4 K/UL    Basophils Absolute 0.0 0.0 - 0.1 K/UL    Absolute Immature Granulocyte 0.0 0.00 - 0.04 K/UL    Differential Type SMEAR SCANNED      RBC Comment ANISOCYTOSIS  2+        RBC Comment RBC FRAGMENTS     Comprehensive Metabolic Panel    Collection Time: 11/22/23  8:53 AM   Result Value Ref Range    Sodium 141 136 - 145 mmol/L    Potassium 3.3 (L) 3.5 - 5.1 mmol/L Chloride 108 97 - 108 mmol/L    CO2 30 21 - 32 mmol/L    Anion Gap 3 (L) 5 - 15 mmol/L    Glucose 101 (H) 65 - 100 mg/dL    BUN 15 6 - 20 MG/DL    Creatinine 0.45 (L) 0.55 - 1.02 MG/DL    Bun/Cre Ratio 33 (H) 12 - 20      Est, Glom Filt Rate >60 >60 ml/min/1.73m2    Calcium 8.6 8.5 - 10.1 MG/DL    Total Bilirubin 0.6 0.2 - 1.0 MG/DL    ALT 37 12 - 78 U/L    AST 36 15 - 37 U/L    Alk Phosphatase 47 45 - 117 U/L    Total Protein 6.6 6.4 - 8.2 g/dL    Albumin 3.6 3.5 - 5.0 g/dL    Globulin 3.0 2.0 - 4.0 g/dL    Albumin/Globulin Ratio 1.2 1.1 - 2.2

## 2023-11-28 DIAGNOSIS — C50.912 INVASIVE DUCTAL CARCINOMA OF BREAST, FEMALE, LEFT (HCC): ICD-10-CM

## 2023-11-28 DIAGNOSIS — Z76.89 PREVENTION OF CHEMOTHERAPY-INDUCED NEUTROPENIA: ICD-10-CM

## 2023-11-28 DIAGNOSIS — Z51.11 ENCOUNTER FOR ANTINEOPLASTIC CHEMOTHERAPY: Primary | ICD-10-CM

## 2023-11-28 RX ORDER — DIPHENOXYLATE HYDROCHLORIDE AND ATROPINE SULFATE 2.5; .025 MG/1; MG/1
1 TABLET ORAL 4 TIMES DAILY PRN
Qty: 120 TABLET | Refills: 0 | Status: SHIPPED | OUTPATIENT
Start: 2023-11-28 | End: 2023-12-28

## 2023-11-29 ENCOUNTER — RESEARCH ENCOUNTER (OUTPATIENT)
Age: 53
End: 2023-11-29

## 2023-11-29 ENCOUNTER — HOSPITAL ENCOUNTER (OUTPATIENT)
Facility: HOSPITAL | Age: 53
Setting detail: INFUSION SERIES
Discharge: HOME | End: 2023-11-29
Payer: COMMERCIAL

## 2023-11-29 VITALS
RESPIRATION RATE: 16 BRPM | WEIGHT: 96.8 LBS | BODY MASS INDEX: 16.53 KG/M2 | SYSTOLIC BLOOD PRESSURE: 105 MMHG | OXYGEN SATURATION: 100 % | DIASTOLIC BLOOD PRESSURE: 58 MMHG | HEART RATE: 85 BPM | TEMPERATURE: 98.8 F | HEIGHT: 64 IN

## 2023-11-29 DIAGNOSIS — Z51.11 ENCOUNTER FOR ANTINEOPLASTIC CHEMOTHERAPY: Primary | ICD-10-CM

## 2023-11-29 DIAGNOSIS — Z76.89 PREVENTION OF CHEMOTHERAPY-INDUCED NEUTROPENIA: ICD-10-CM

## 2023-11-29 DIAGNOSIS — C50.912 INVASIVE DUCTAL CARCINOMA OF BREAST, FEMALE, LEFT (HCC): ICD-10-CM

## 2023-11-29 LAB
BASOPHILS # BLD: 0 K/UL (ref 0–0.1)
BASOPHILS NFR BLD: 1 % (ref 0–1)
DIFFERENTIAL METHOD BLD: ABNORMAL
EOSINOPHIL # BLD: 0 K/UL (ref 0–0.4)
EOSINOPHIL NFR BLD: 0 % (ref 0–7)
ERYTHROCYTE [DISTWIDTH] IN BLOOD BY AUTOMATED COUNT: 18.5 % (ref 11.5–14.5)
HCT VFR BLD AUTO: 25.6 % (ref 35–47)
HGB BLD-MCNC: 8.6 G/DL (ref 11.5–16)
IMM GRANULOCYTES # BLD AUTO: 0 K/UL (ref 0–0.04)
IMM GRANULOCYTES NFR BLD AUTO: 1 % (ref 0–0.5)
LYMPHOCYTES # BLD: 0.6 K/UL (ref 0.8–3.5)
LYMPHOCYTES NFR BLD: 28 % (ref 12–49)
MCH RBC QN AUTO: 31.2 PG (ref 26–34)
MCHC RBC AUTO-ENTMCNC: 33.6 G/DL (ref 30–36.5)
MCV RBC AUTO: 92.8 FL (ref 80–99)
MONOCYTES # BLD: 0.3 K/UL (ref 0–1)
MONOCYTES NFR BLD: 14 % (ref 5–13)
NEUTS SEG # BLD: 1.4 K/UL (ref 1.8–8)
NEUTS SEG NFR BLD: 56 % (ref 32–75)
NRBC # BLD: 0 K/UL (ref 0–0.01)
NRBC BLD-RTO: 0 PER 100 WBC
PLATELET # BLD AUTO: 168 K/UL (ref 150–400)
PMV BLD AUTO: 9.3 FL (ref 8.9–12.9)
RBC # BLD AUTO: 2.76 M/UL (ref 3.8–5.2)
RBC MORPH BLD: ABNORMAL
WBC # BLD AUTO: 2.3 K/UL (ref 3.6–11)
WBC MORPH BLD: ABNORMAL

## 2023-11-29 PROCEDURE — 96413 CHEMO IV INFUSION 1 HR: CPT

## 2023-11-29 PROCEDURE — 96361 HYDRATE IV INFUSION ADD-ON: CPT

## 2023-11-29 PROCEDURE — 96360 HYDRATION IV INFUSION INIT: CPT

## 2023-11-29 PROCEDURE — 85025 COMPLETE CBC W/AUTO DIFF WBC: CPT

## 2023-11-29 PROCEDURE — 2580000003 HC RX 258: Performed by: INTERNAL MEDICINE

## 2023-11-29 PROCEDURE — 2580000003 HC RX 258: Performed by: NURSE PRACTITIONER

## 2023-11-29 PROCEDURE — 96375 TX/PRO/DX INJ NEW DRUG ADDON: CPT

## 2023-11-29 PROCEDURE — 6360000002 HC RX W HCPCS: Performed by: INTERNAL MEDICINE

## 2023-11-29 PROCEDURE — 96374 THER/PROPH/DIAG INJ IV PUSH: CPT

## 2023-11-29 PROCEDURE — 6370000000 HC RX 637 (ALT 250 FOR IP): Performed by: INTERNAL MEDICINE

## 2023-11-29 PROCEDURE — 2500000003 HC RX 250 WO HCPCS: Performed by: INTERNAL MEDICINE

## 2023-11-29 RX ORDER — DEXAMETHASONE SODIUM PHOSPHATE 10 MG/ML
10 INJECTION INTRAMUSCULAR; INTRAVENOUS ONCE
Status: COMPLETED | OUTPATIENT
Start: 2023-11-29 | End: 2023-11-29

## 2023-11-29 RX ORDER — SODIUM CHLORIDE 0.9 % (FLUSH) 0.9 %
5-40 SYRINGE (ML) INJECTION PRN
Status: DISCONTINUED | OUTPATIENT
Start: 2023-11-29 | End: 2023-11-30 | Stop reason: HOSPADM

## 2023-11-29 RX ORDER — 0.9 % SODIUM CHLORIDE 0.9 %
1000 INTRAVENOUS SOLUTION INTRAVENOUS ONCE
Status: COMPLETED | OUTPATIENT
Start: 2023-11-29 | End: 2023-11-29

## 2023-11-29 RX ORDER — DIPHENHYDRAMINE HCL 25 MG
50 CAPSULE ORAL ONCE
Status: COMPLETED | OUTPATIENT
Start: 2023-11-29 | End: 2023-11-29

## 2023-11-29 RX ORDER — SODIUM CHLORIDE 9 MG/ML
5-250 INJECTION, SOLUTION INTRAVENOUS PRN
Status: DISCONTINUED | OUTPATIENT
Start: 2023-11-29 | End: 2023-11-30 | Stop reason: HOSPADM

## 2023-11-29 RX ADMIN — SODIUM CHLORIDE, PRESERVATIVE FREE 20 ML: 5 INJECTION INTRAVENOUS at 15:01

## 2023-11-29 RX ADMIN — SODIUM CHLORIDE, PRESERVATIVE FREE 20 MG: 5 INJECTION INTRAVENOUS at 13:05

## 2023-11-29 RX ADMIN — PACLITAXEL 115 MG: 6 INJECTION, SOLUTION INTRAVENOUS at 13:56

## 2023-11-29 RX ADMIN — SODIUM CHLORIDE 1000 ML: 9 INJECTION, SOLUTION INTRAVENOUS at 12:28

## 2023-11-29 RX ADMIN — DEXAMETHASONE SODIUM PHOSPHATE 10 MG: 10 INJECTION INTRAMUSCULAR; INTRAVENOUS at 13:07

## 2023-11-29 RX ADMIN — DIPHENHYDRAMINE HYDROCHLORIDE 50 MG: 25 CAPSULE ORAL at 13:04

## 2023-11-29 ASSESSMENT — PAIN SCALES - GENERAL: PAINLEVEL_OUTOF10: 0

## 2023-11-30 RX ORDER — HEPARIN 100 UNIT/ML
500 SYRINGE INTRAVENOUS PRN
OUTPATIENT
Start: 2023-12-20

## 2023-11-30 RX ORDER — HEPARIN 100 UNIT/ML
500 SYRINGE INTRAVENOUS PRN
OUTPATIENT
Start: 2023-12-13

## 2023-11-30 RX ORDER — ONDANSETRON 2 MG/ML
8 INJECTION INTRAMUSCULAR; INTRAVENOUS
Status: CANCELLED | OUTPATIENT
Start: 2023-12-06

## 2023-11-30 RX ORDER — MEPERIDINE HYDROCHLORIDE 25 MG/ML
12.5 INJECTION INTRAMUSCULAR; INTRAVENOUS; SUBCUTANEOUS PRN
Status: CANCELLED | OUTPATIENT
Start: 2023-12-06

## 2023-11-30 RX ORDER — DEXAMETHASONE SODIUM PHOSPHATE 10 MG/ML
10 INJECTION INTRAMUSCULAR; INTRAVENOUS ONCE
Status: CANCELLED | OUTPATIENT
Start: 2023-12-06 | End: 2023-12-06

## 2023-11-30 RX ORDER — SODIUM CHLORIDE 0.9 % (FLUSH) 0.9 %
5-40 SYRINGE (ML) INJECTION PRN
OUTPATIENT
Start: 2023-12-13

## 2023-11-30 RX ORDER — SODIUM CHLORIDE 9 MG/ML
5-250 INJECTION, SOLUTION INTRAVENOUS PRN
OUTPATIENT
Start: 2023-12-20

## 2023-11-30 RX ORDER — EPINEPHRINE 1 MG/ML
0.3 INJECTION, SOLUTION, CONCENTRATE INTRAVENOUS PRN
Status: CANCELLED | OUTPATIENT
Start: 2023-12-06

## 2023-11-30 RX ORDER — DIPHENHYDRAMINE HYDROCHLORIDE 50 MG/ML
50 INJECTION INTRAMUSCULAR; INTRAVENOUS
OUTPATIENT
Start: 2023-12-20

## 2023-11-30 RX ORDER — DIPHENHYDRAMINE HCL 25 MG
50 CAPSULE ORAL ONCE
Status: CANCELLED | OUTPATIENT
Start: 2023-12-06 | End: 2023-12-06

## 2023-11-30 RX ORDER — ALBUTEROL SULFATE 90 UG/1
4 AEROSOL, METERED RESPIRATORY (INHALATION) PRN
OUTPATIENT
Start: 2023-12-20

## 2023-11-30 RX ORDER — EPINEPHRINE 1 MG/ML
0.3 INJECTION, SOLUTION, CONCENTRATE INTRAVENOUS PRN
OUTPATIENT
Start: 2023-12-20

## 2023-11-30 RX ORDER — DIPHENHYDRAMINE HCL 25 MG
50 CAPSULE ORAL ONCE
OUTPATIENT
Start: 2023-12-20 | End: 2023-12-20

## 2023-11-30 RX ORDER — HEPARIN 100 UNIT/ML
500 SYRINGE INTRAVENOUS PRN
Status: CANCELLED | OUTPATIENT
Start: 2023-12-06

## 2023-11-30 RX ORDER — PROCHLORPERAZINE EDISYLATE 5 MG/ML
10 INJECTION INTRAMUSCULAR; INTRAVENOUS
OUTPATIENT
Start: 2023-12-13

## 2023-11-30 RX ORDER — ALBUTEROL SULFATE 90 UG/1
4 AEROSOL, METERED RESPIRATORY (INHALATION) PRN
OUTPATIENT
Start: 2023-12-13

## 2023-11-30 RX ORDER — SODIUM CHLORIDE 0.9 % (FLUSH) 0.9 %
5-40 SYRINGE (ML) INJECTION PRN
OUTPATIENT
Start: 2023-12-20

## 2023-11-30 RX ORDER — SODIUM CHLORIDE 9 MG/ML
5-250 INJECTION, SOLUTION INTRAVENOUS PRN
OUTPATIENT
Start: 2023-12-13

## 2023-11-30 RX ORDER — DIPHENHYDRAMINE HCL 25 MG
50 CAPSULE ORAL ONCE
OUTPATIENT
Start: 2023-12-13 | End: 2023-12-13

## 2023-11-30 RX ORDER — MEPERIDINE HYDROCHLORIDE 25 MG/ML
12.5 INJECTION INTRAMUSCULAR; INTRAVENOUS; SUBCUTANEOUS PRN
OUTPATIENT
Start: 2023-12-13

## 2023-11-30 RX ORDER — ACETAMINOPHEN 325 MG/1
650 TABLET ORAL
Status: CANCELLED | OUTPATIENT
Start: 2023-12-06

## 2023-11-30 RX ORDER — DEXAMETHASONE SODIUM PHOSPHATE 10 MG/ML
10 INJECTION INTRAMUSCULAR; INTRAVENOUS ONCE
OUTPATIENT
Start: 2023-12-20 | End: 2023-12-20

## 2023-11-30 RX ORDER — ALBUTEROL SULFATE 90 UG/1
4 AEROSOL, METERED RESPIRATORY (INHALATION) PRN
Status: CANCELLED | OUTPATIENT
Start: 2023-12-06

## 2023-11-30 RX ORDER — ONDANSETRON 2 MG/ML
8 INJECTION INTRAMUSCULAR; INTRAVENOUS
OUTPATIENT
Start: 2023-12-20

## 2023-11-30 RX ORDER — ONDANSETRON 2 MG/ML
8 INJECTION INTRAMUSCULAR; INTRAVENOUS
OUTPATIENT
Start: 2023-12-13

## 2023-11-30 RX ORDER — DIPHENHYDRAMINE HYDROCHLORIDE 50 MG/ML
50 INJECTION INTRAMUSCULAR; INTRAVENOUS
OUTPATIENT
Start: 2023-12-13

## 2023-11-30 RX ORDER — SODIUM CHLORIDE 9 MG/ML
INJECTION, SOLUTION INTRAVENOUS CONTINUOUS
Status: CANCELLED | OUTPATIENT
Start: 2023-12-06

## 2023-11-30 RX ORDER — MEPERIDINE HYDROCHLORIDE 25 MG/ML
12.5 INJECTION INTRAMUSCULAR; INTRAVENOUS; SUBCUTANEOUS PRN
OUTPATIENT
Start: 2023-12-20

## 2023-11-30 RX ORDER — SODIUM CHLORIDE 9 MG/ML
5-250 INJECTION, SOLUTION INTRAVENOUS PRN
Status: CANCELLED | OUTPATIENT
Start: 2023-12-06

## 2023-11-30 RX ORDER — SODIUM CHLORIDE 9 MG/ML
INJECTION, SOLUTION INTRAVENOUS CONTINUOUS
OUTPATIENT
Start: 2023-12-20

## 2023-11-30 RX ORDER — DIPHENHYDRAMINE HYDROCHLORIDE 50 MG/ML
50 INJECTION INTRAMUSCULAR; INTRAVENOUS
Status: CANCELLED | OUTPATIENT
Start: 2023-12-06

## 2023-11-30 RX ORDER — EPINEPHRINE 1 MG/ML
0.3 INJECTION, SOLUTION, CONCENTRATE INTRAVENOUS PRN
OUTPATIENT
Start: 2023-12-13

## 2023-11-30 RX ORDER — PROCHLORPERAZINE EDISYLATE 5 MG/ML
10 INJECTION INTRAMUSCULAR; INTRAVENOUS
OUTPATIENT
Start: 2023-12-20

## 2023-11-30 RX ORDER — DEXAMETHASONE SODIUM PHOSPHATE 10 MG/ML
10 INJECTION INTRAMUSCULAR; INTRAVENOUS ONCE
OUTPATIENT
Start: 2023-12-13 | End: 2023-12-13

## 2023-11-30 RX ORDER — ACETAMINOPHEN 325 MG/1
650 TABLET ORAL
OUTPATIENT
Start: 2023-12-20

## 2023-11-30 RX ORDER — SODIUM CHLORIDE 9 MG/ML
INJECTION, SOLUTION INTRAVENOUS CONTINUOUS
OUTPATIENT
Start: 2023-12-13

## 2023-11-30 RX ORDER — ACETAMINOPHEN 325 MG/1
650 TABLET ORAL
OUTPATIENT
Start: 2023-12-13

## 2023-12-06 ENCOUNTER — OFFICE VISIT (OUTPATIENT)
Age: 53
End: 2023-12-06

## 2023-12-06 ENCOUNTER — HOSPITAL ENCOUNTER (OUTPATIENT)
Facility: HOSPITAL | Age: 53
Setting detail: INFUSION SERIES
Discharge: HOME | End: 2023-12-06
Payer: COMMERCIAL

## 2023-12-06 ENCOUNTER — RESEARCH ENCOUNTER (OUTPATIENT)
Age: 53
End: 2023-12-06

## 2023-12-06 VITALS
SYSTOLIC BLOOD PRESSURE: 101 MMHG | TEMPERATURE: 97.7 F | HEART RATE: 89 BPM | BODY MASS INDEX: 16.51 KG/M2 | RESPIRATION RATE: 16 BRPM | HEIGHT: 64 IN | OXYGEN SATURATION: 100 % | DIASTOLIC BLOOD PRESSURE: 63 MMHG | WEIGHT: 96.7 LBS

## 2023-12-06 VITALS
WEIGHT: 96 LBS | DIASTOLIC BLOOD PRESSURE: 62 MMHG | BODY MASS INDEX: 16.48 KG/M2 | OXYGEN SATURATION: 99 % | HEART RATE: 92 BPM | SYSTOLIC BLOOD PRESSURE: 113 MMHG | TEMPERATURE: 97.8 F | RESPIRATION RATE: 16 BRPM

## 2023-12-06 DIAGNOSIS — E87.6 HYPOKALEMIA: ICD-10-CM

## 2023-12-06 DIAGNOSIS — Z51.11 ENCOUNTER FOR ANTINEOPLASTIC CHEMOTHERAPY: Primary | ICD-10-CM

## 2023-12-06 DIAGNOSIS — Z76.89 PREVENTION OF CHEMOTHERAPY-INDUCED NEUTROPENIA: ICD-10-CM

## 2023-12-06 DIAGNOSIS — C50.112 MALIGNANT NEOPLASM OF CENTRAL PORTION OF LEFT BREAST IN FEMALE, ESTROGEN RECEPTOR POSITIVE (HCC): Primary | ICD-10-CM

## 2023-12-06 DIAGNOSIS — C50.912 INVASIVE DUCTAL CARCINOMA OF BREAST, FEMALE, LEFT (HCC): ICD-10-CM

## 2023-12-06 DIAGNOSIS — Z17.0 MALIGNANT NEOPLASM OF CENTRAL PORTION OF LEFT BREAST IN FEMALE, ESTROGEN RECEPTOR POSITIVE (HCC): Primary | ICD-10-CM

## 2023-12-06 LAB
ALBUMIN SERPL-MCNC: 3.6 G/DL (ref 3.5–5)
ALBUMIN/GLOB SERPL: 1.2 (ref 1.1–2.2)
ALP SERPL-CCNC: 42 U/L (ref 45–117)
ALT SERPL-CCNC: 33 U/L (ref 12–78)
ANION GAP SERPL CALC-SCNC: 6 MMOL/L (ref 5–15)
AST SERPL-CCNC: 27 U/L (ref 15–37)
BASOPHILS # BLD: 0 K/UL (ref 0–0.1)
BASOPHILS NFR BLD: 1 % (ref 0–1)
BILIRUB SERPL-MCNC: 0.6 MG/DL (ref 0.2–1)
BUN SERPL-MCNC: 10 MG/DL (ref 6–20)
BUN/CREAT SERPL: 19 (ref 12–20)
CALCIUM SERPL-MCNC: 8.7 MG/DL (ref 8.5–10.1)
CHLORIDE SERPL-SCNC: 106 MMOL/L (ref 97–108)
CO2 SERPL-SCNC: 30 MMOL/L (ref 21–32)
CREAT SERPL-MCNC: 0.52 MG/DL (ref 0.55–1.02)
DIFFERENTIAL METHOD BLD: ABNORMAL
EOSINOPHIL # BLD: 0 K/UL (ref 0–0.4)
EOSINOPHIL NFR BLD: 0 % (ref 0–7)
ERYTHROCYTE [DISTWIDTH] IN BLOOD BY AUTOMATED COUNT: 17.7 % (ref 11.5–14.5)
GLOBULIN SER CALC-MCNC: 3 G/DL (ref 2–4)
GLUCOSE SERPL-MCNC: 156 MG/DL (ref 65–100)
HCT VFR BLD AUTO: 24.2 % (ref 35–47)
HGB BLD-MCNC: 8.3 G/DL (ref 11.5–16)
IMM GRANULOCYTES # BLD AUTO: 0 K/UL (ref 0–0.04)
IMM GRANULOCYTES NFR BLD AUTO: 0 % (ref 0–0.5)
LYMPHOCYTES # BLD: 0.3 K/UL (ref 0.8–3.5)
LYMPHOCYTES NFR BLD: 27 % (ref 12–49)
MCH RBC QN AUTO: 32.9 PG (ref 26–34)
MCHC RBC AUTO-ENTMCNC: 34.3 G/DL (ref 30–36.5)
MCV RBC AUTO: 96 FL (ref 80–99)
MONOCYTES # BLD: 0.2 K/UL (ref 0–1)
MONOCYTES NFR BLD: 17 % (ref 5–13)
NEUTS SEG # BLD: 0.7 K/UL (ref 1.8–8)
NEUTS SEG NFR BLD: 55 % (ref 32–75)
NRBC # BLD: 0 K/UL (ref 0–0.01)
NRBC BLD-RTO: 0 PER 100 WBC
PLATELET # BLD AUTO: 174 K/UL (ref 150–400)
PMV BLD AUTO: 9.3 FL (ref 8.9–12.9)
POTASSIUM SERPL-SCNC: 2.9 MMOL/L (ref 3.5–5.1)
PROT SERPL-MCNC: 6.6 G/DL (ref 6.4–8.2)
RBC # BLD AUTO: 2.52 M/UL (ref 3.8–5.2)
RBC MORPH BLD: ABNORMAL
SODIUM SERPL-SCNC: 142 MMOL/L (ref 136–145)
WBC # BLD AUTO: 1.2 K/UL (ref 3.6–11)

## 2023-12-06 PROCEDURE — 2580000003 HC RX 258: Performed by: INTERNAL MEDICINE

## 2023-12-06 PROCEDURE — 96417 CHEMO IV INFUS EACH ADDL SEQ: CPT

## 2023-12-06 PROCEDURE — 6370000000 HC RX 637 (ALT 250 FOR IP): Performed by: NURSE PRACTITIONER

## 2023-12-06 PROCEDURE — 85025 COMPLETE CBC W/AUTO DIFF WBC: CPT

## 2023-12-06 PROCEDURE — 80053 COMPREHEN METABOLIC PANEL: CPT

## 2023-12-06 PROCEDURE — 2500000003 HC RX 250 WO HCPCS: Performed by: INTERNAL MEDICINE

## 2023-12-06 RX ORDER — SODIUM CHLORIDE 9 MG/ML
5-250 INJECTION, SOLUTION INTRAVENOUS PRN
Status: DISCONTINUED | OUTPATIENT
Start: 2023-12-06 | End: 2023-12-07 | Stop reason: HOSPADM

## 2023-12-06 RX ORDER — POTASSIUM CHLORIDE 750 MG/1
60 TABLET, FILM COATED, EXTENDED RELEASE ORAL ONCE
Status: COMPLETED | OUTPATIENT
Start: 2023-12-06 | End: 2023-12-06

## 2023-12-06 RX ORDER — POTASSIUM CHLORIDE 20 MEQ/1
20 TABLET, EXTENDED RELEASE ORAL DAILY
Qty: 30 TABLET | Refills: 2 | Status: SHIPPED | OUTPATIENT
Start: 2023-12-06

## 2023-12-06 RX ORDER — PROCHLORPERAZINE EDISYLATE 5 MG/ML
10 INJECTION INTRAMUSCULAR; INTRAVENOUS
Status: DISCONTINUED | OUTPATIENT
Start: 2023-12-06 | End: 2023-12-07 | Stop reason: HOSPADM

## 2023-12-06 RX ORDER — SODIUM CHLORIDE 0.9 % (FLUSH) 0.9 %
5-40 SYRINGE (ML) INJECTION PRN
Status: DISCONTINUED | OUTPATIENT
Start: 2023-12-06 | End: 2023-12-07 | Stop reason: HOSPADM

## 2023-12-06 RX ADMIN — SODIUM CHLORIDE, PRESERVATIVE FREE 20 ML: 5 INJECTION INTRAVENOUS at 12:39

## 2023-12-06 RX ADMIN — Medication 420 MG: at 12:00

## 2023-12-06 RX ADMIN — SODIUM CHLORIDE 25 ML/HR: 9 INJECTION, SOLUTION INTRAVENOUS at 11:15

## 2023-12-06 RX ADMIN — POTASSIUM CHLORIDE 60 MEQ: 750 TABLET, FILM COATED, EXTENDED RELEASE ORAL at 11:36

## 2023-12-06 RX ADMIN — Medication 260 MG: at 11:19

## 2023-12-06 ASSESSMENT — PAIN SCALES - GENERAL: PAINLEVEL_OUTOF10: 0

## 2023-12-06 NOTE — PROGRESS NOTES
Valdo Mathur is a 47 y.o. female here today to follow up for breast cancer    1. Have you been to the ER, urgent care clinic since your last visit? Hospitalized since your last visit? no    2. Have you seen or consulted any other health care providers outside of the [address] [place] [address] Avenue since your last visit? Include any pap smears or colon screening.  no

## 2023-12-06 NOTE — PROGRESS NOTES
Patient in today for labs and follow-up visit with Dr. Faina Pacheco. Today is C2D1 on study FB-12. Patient reports the following adverse events: gr 1 loss of appetite, gr 1 diarrhea, gr 1 atigue, gr 1 hair loss, gr 1 insomnia, gr 1 anxiety, gr 1 dyspnea, gr 1 headache, gr 1 loss of libido. Vital signs are stable. Patient to receive HP only. Next appt is 12/13/2023.

## 2023-12-06 NOTE — PROGRESS NOTES
[facility] [place] at [address], [address] [place] [address] [place]: [phone]  F: [phone]      Reason for Visit:   Edison Mayer is a 47 y.o. female who is seen in follow up for breast cancer. Breast Surgeon: Dr. Maru Gomez    Treatment History:   7/18/23 left breast stellate mass, core bx:  IDC, gr 2, 4 mm, ER + at 98%, [place] + at 54%, HER 2 negative at [facility] 1+ (SOHA ration 1; sig/cell 1.85), ki67 59%, DCIS, no LVI  Mammaprint shows luminal B, high risk, index -0.160  8/1/23 left axilla LN core bx:  + for breast cancer  Genetic testing , negative BRCA 1/2 by [email or web address] 2021  NSABP FB-12  [facility] 9/20/23 - 11/1/23  Paclitaxel/trastuzumab/pertuzumab 11/15/23-  Held taxol due to Nicholasberg 0.9 on c1d1  Held taxol due to Nicholasberg 0.7 on C2d1    History of Present Illness: An abnormal mammogram led to the pathology above. Interval history:  Patient presents today for follow up and treatment. Reports gr 1 loss of appetite, gr 1 diarrhea, gr 1 atigue, gr 1 hair loss, gr 1 insomnia, gr 1 anxiety, gr 1 dyspnea, gr 1 headache, gr 1 loss of libido. FH:  mother with breast cancer, dx at age 80; maternal aunt with breast cancer, dx 47-56s and lung cancer; maternal aunt:  ovarian cancer, dx 45s; father with colon cancer dx 62s    Lmp 12/2022    Past Medical History:   Diagnosis Date    Anxiety     Invasive ductal carcinoma of breast, female, left ([address]) 07/24/2023 7/18/23: LEFT breast, Stellate Mass, Stereotatic biopsy: invasive and in situ carcinoma, grade 2. Largest tumor focus measured 4mm. No lymphovascular invasion seen. Prognostic markers pending.     Murmur     PONV (postoperative nausea and vomiting)       Past Surgical History:   Procedure Laterality Date    APPENDECTOMY  2000    COLONOSCOPY  2007    ENDOSCOPY, COLON, DIAGNOSTIC      GI  2007    COLONOSCOPY    NASAL SEPTUM SURGERY  2012    PORT SURGERY Right 8/29/2023    LUZ MARIA CATH PLACEMENT, ULTRASOUND GUIDED CORE BIOPSY OF LEFT

## 2023-12-13 ENCOUNTER — RESEARCH ENCOUNTER (OUTPATIENT)
Age: 53
End: 2023-12-13

## 2023-12-13 ENCOUNTER — HOSPITAL ENCOUNTER (OUTPATIENT)
Facility: HOSPITAL | Age: 53
Setting detail: INFUSION SERIES
Discharge: HOME | End: 2023-12-13
Payer: COMMERCIAL

## 2023-12-13 VITALS
BODY MASS INDEX: 16.49 KG/M2 | HEIGHT: 64 IN | HEART RATE: 76 BPM | DIASTOLIC BLOOD PRESSURE: 68 MMHG | OXYGEN SATURATION: 96 % | TEMPERATURE: 98.2 F | SYSTOLIC BLOOD PRESSURE: 107 MMHG | RESPIRATION RATE: 17 BRPM | WEIGHT: 96.6 LBS

## 2023-12-13 DIAGNOSIS — Z76.89 PREVENTION OF CHEMOTHERAPY-INDUCED NEUTROPENIA: Primary | ICD-10-CM

## 2023-12-13 DIAGNOSIS — C50.912 INVASIVE DUCTAL CARCINOMA OF BREAST, FEMALE, LEFT (HCC): ICD-10-CM

## 2023-12-13 DIAGNOSIS — Z51.11 ENCOUNTER FOR ANTINEOPLASTIC CHEMOTHERAPY: ICD-10-CM

## 2023-12-13 LAB
BASOPHILS # BLD: 0 K/UL (ref 0–0.1)
BASOPHILS NFR BLD: 1 % (ref 0–1)
DIFFERENTIAL METHOD BLD: ABNORMAL
EOSINOPHIL # BLD: 0.1 K/UL (ref 0–0.4)
EOSINOPHIL NFR BLD: 3 % (ref 0–7)
ERYTHROCYTE [DISTWIDTH] IN BLOOD BY AUTOMATED COUNT: 16.1 % (ref 11.5–14.5)
HCT VFR BLD AUTO: 28.4 % (ref 35–47)
HGB BLD-MCNC: 9.2 G/DL (ref 11.5–16)
IMM GRANULOCYTES # BLD AUTO: 0 K/UL (ref 0–0.04)
IMM GRANULOCYTES NFR BLD AUTO: 0 % (ref 0–0.5)
LYMPHOCYTES # BLD: 0.5 K/UL (ref 0.8–3.5)
LYMPHOCYTES NFR BLD: 24 % (ref 12–49)
MCH RBC QN AUTO: 31.6 PG (ref 26–34)
MCHC RBC AUTO-ENTMCNC: 32.4 G/DL (ref 30–36.5)
MCV RBC AUTO: 97.6 FL (ref 80–99)
MONOCYTES # BLD: 0.4 K/UL (ref 0–1)
MONOCYTES NFR BLD: 17 % (ref 5–13)
NEUTS SEG # BLD: 1.2 K/UL (ref 1.8–8)
NEUTS SEG NFR BLD: 55 % (ref 32–75)
NRBC # BLD: 0 K/UL (ref 0–0.01)
NRBC BLD-RTO: 0 PER 100 WBC
PLATELET # BLD AUTO: 211 K/UL (ref 150–400)
PMV BLD AUTO: 9.3 FL (ref 8.9–12.9)
RBC # BLD AUTO: 2.91 M/UL (ref 3.8–5.2)
RBC MORPH BLD: ABNORMAL
WBC # BLD AUTO: 2.2 K/UL (ref 3.6–11)

## 2023-12-13 PROCEDURE — 2500000003 HC RX 250 WO HCPCS: Performed by: INTERNAL MEDICINE

## 2023-12-13 PROCEDURE — 2580000003 HC RX 258: Performed by: INTERNAL MEDICINE

## 2023-12-13 PROCEDURE — 96413 CHEMO IV INFUSION 1 HR: CPT

## 2023-12-13 PROCEDURE — 85025 COMPLETE CBC W/AUTO DIFF WBC: CPT

## 2023-12-13 PROCEDURE — 6360000002 HC RX W HCPCS: Performed by: INTERNAL MEDICINE

## 2023-12-13 PROCEDURE — 6370000000 HC RX 637 (ALT 250 FOR IP): Performed by: INTERNAL MEDICINE

## 2023-12-13 PROCEDURE — 96375 TX/PRO/DX INJ NEW DRUG ADDON: CPT

## 2023-12-13 RX ORDER — DIPHENHYDRAMINE HCL 25 MG
50 CAPSULE ORAL ONCE
Status: COMPLETED | OUTPATIENT
Start: 2023-12-13 | End: 2023-12-13

## 2023-12-13 RX ORDER — FAMOTIDINE 10 MG/ML
20 INJECTION, SOLUTION INTRAVENOUS ONCE
Status: COMPLETED | OUTPATIENT
Start: 2023-12-13 | End: 2023-12-13

## 2023-12-13 RX ORDER — SODIUM CHLORIDE 0.9 % (FLUSH) 0.9 %
5-40 SYRINGE (ML) INJECTION PRN
Status: DISCONTINUED | OUTPATIENT
Start: 2023-12-13 | End: 2023-12-14 | Stop reason: HOSPADM

## 2023-12-13 RX ORDER — SODIUM CHLORIDE 9 MG/ML
5-250 INJECTION, SOLUTION INTRAVENOUS PRN
Status: DISCONTINUED | OUTPATIENT
Start: 2023-12-13 | End: 2023-12-14 | Stop reason: HOSPADM

## 2023-12-13 RX ORDER — DEXAMETHASONE SODIUM PHOSPHATE 10 MG/ML
10 INJECTION INTRAMUSCULAR; INTRAVENOUS ONCE
Status: COMPLETED | OUTPATIENT
Start: 2023-12-13 | End: 2023-12-13

## 2023-12-13 RX ADMIN — PACLITAXEL 115 MG: 6 INJECTION, SOLUTION INTRAVENOUS at 11:17

## 2023-12-13 RX ADMIN — DEXAMETHASONE SODIUM PHOSPHATE 10 MG: 10 INJECTION INTRAMUSCULAR; INTRAVENOUS at 10:38

## 2023-12-13 RX ADMIN — DIPHENHYDRAMINE HYDROCHLORIDE 50 MG: 25 CAPSULE ORAL at 10:33

## 2023-12-13 RX ADMIN — SODIUM CHLORIDE, PRESERVATIVE FREE 10 ML: 5 INJECTION INTRAVENOUS at 10:36

## 2023-12-13 RX ADMIN — FAMOTIDINE 20 MG: 10 INJECTION INTRAVENOUS at 10:36

## 2023-12-13 ASSESSMENT — PAIN SCALES - GENERAL: PAINLEVEL_OUTOF10: 0

## 2023-12-13 NOTE — PROGRESS NOTES
Spiritual Care Partner Volunteer visited patient at [facility] in [id number] [place] [address] on 12/13/2023     Documented by:  Devin Espinosa MDiv  Staff   Paging Service [phone] (KOURTNEY)

## 2023-12-14 ENCOUNTER — CLINICAL DOCUMENTATION (OUTPATIENT)
Age: 53
End: 2023-12-14

## 2023-12-14 DIAGNOSIS — C50.912 INVASIVE DUCTAL CARCINOMA OF BREAST, STAGE 2, LEFT (HCC): Primary | ICD-10-CM

## 2023-12-14 NOTE — PROGRESS NOTES
Faxed office note, Pathology report, and recent imaging (7/18/23) to [facility]. Confirmation received.

## 2023-12-18 RX ORDER — HEPARIN 100 UNIT/ML
500 SYRINGE INTRAVENOUS PRN
Status: CANCELLED | OUTPATIENT
Start: 2024-01-03

## 2023-12-18 RX ORDER — ACETAMINOPHEN 325 MG/1
650 TABLET ORAL
Status: CANCELLED | OUTPATIENT
Start: 2023-12-27

## 2023-12-18 RX ORDER — ONDANSETRON 2 MG/ML
8 INJECTION INTRAMUSCULAR; INTRAVENOUS
Status: CANCELLED | OUTPATIENT
Start: 2024-01-03

## 2023-12-18 RX ORDER — SODIUM CHLORIDE 9 MG/ML
5-250 INJECTION, SOLUTION INTRAVENOUS PRN
OUTPATIENT
Start: 2024-01-10

## 2023-12-18 RX ORDER — SODIUM CHLORIDE 9 MG/ML
INJECTION, SOLUTION INTRAVENOUS CONTINUOUS
Status: CANCELLED | OUTPATIENT
Start: 2024-01-03

## 2023-12-18 RX ORDER — ACETAMINOPHEN 325 MG/1
650 TABLET ORAL
OUTPATIENT
Start: 2024-01-10

## 2023-12-18 RX ORDER — MEPERIDINE HYDROCHLORIDE 25 MG/ML
12.5 INJECTION INTRAMUSCULAR; INTRAVENOUS; SUBCUTANEOUS PRN
OUTPATIENT
Start: 2024-01-10

## 2023-12-18 RX ORDER — FAMOTIDINE 10 MG/ML
20 INJECTION, SOLUTION INTRAVENOUS
OUTPATIENT
Start: 2024-01-10

## 2023-12-18 RX ORDER — SODIUM CHLORIDE 9 MG/ML
5-250 INJECTION, SOLUTION INTRAVENOUS PRN
Status: CANCELLED | OUTPATIENT
Start: 2024-01-03

## 2023-12-18 RX ORDER — DEXAMETHASONE SODIUM PHOSPHATE 10 MG/ML
10 INJECTION INTRAMUSCULAR; INTRAVENOUS ONCE
Status: CANCELLED | OUTPATIENT
Start: 2024-01-03 | End: 2024-01-03

## 2023-12-18 RX ORDER — DIPHENHYDRAMINE HCL 25 MG
50 CAPSULE ORAL ONCE
Status: CANCELLED | OUTPATIENT
Start: 2024-01-03 | End: 2024-01-03

## 2023-12-18 RX ORDER — FAMOTIDINE 10 MG/ML
20 INJECTION, SOLUTION INTRAVENOUS ONCE
Status: CANCELLED | OUTPATIENT
Start: 2024-01-03 | End: 2024-01-03

## 2023-12-18 RX ORDER — ALBUTEROL SULFATE 90 UG/1
4 AEROSOL, METERED RESPIRATORY (INHALATION) PRN
OUTPATIENT
Start: 2024-01-10

## 2023-12-18 RX ORDER — SODIUM CHLORIDE 9 MG/ML
INJECTION, SOLUTION INTRAVENOUS CONTINUOUS
Status: CANCELLED | OUTPATIENT
Start: 2023-12-27

## 2023-12-18 RX ORDER — EPINEPHRINE 1 MG/ML
0.3 INJECTION, SOLUTION INTRAMUSCULAR; SUBCUTANEOUS PRN
OUTPATIENT
Start: 2024-01-10

## 2023-12-18 RX ORDER — SODIUM CHLORIDE 0.9 % (FLUSH) 0.9 %
5-40 SYRINGE (ML) INJECTION PRN
OUTPATIENT
Start: 2024-01-10

## 2023-12-18 RX ORDER — DIPHENHYDRAMINE HYDROCHLORIDE 50 MG/ML
50 INJECTION INTRAMUSCULAR; INTRAVENOUS
Status: CANCELLED | OUTPATIENT
Start: 2023-12-27

## 2023-12-18 RX ORDER — EPINEPHRINE 1 MG/ML
0.3 INJECTION, SOLUTION INTRAMUSCULAR; SUBCUTANEOUS PRN
Status: CANCELLED | OUTPATIENT
Start: 2024-01-03

## 2023-12-18 RX ORDER — ALBUTEROL SULFATE 90 UG/1
4 AEROSOL, METERED RESPIRATORY (INHALATION) PRN
Status: CANCELLED | OUTPATIENT
Start: 2024-01-03

## 2023-12-18 RX ORDER — FAMOTIDINE 10 MG/ML
20 INJECTION, SOLUTION INTRAVENOUS
Status: CANCELLED | OUTPATIENT
Start: 2023-12-27

## 2023-12-18 RX ORDER — HEPARIN 100 UNIT/ML
500 SYRINGE INTRAVENOUS PRN
OUTPATIENT
Start: 2024-01-10

## 2023-12-18 RX ORDER — MEPERIDINE HYDROCHLORIDE 25 MG/ML
12.5 INJECTION INTRAMUSCULAR; INTRAVENOUS; SUBCUTANEOUS PRN
Status: CANCELLED | OUTPATIENT
Start: 2024-01-03

## 2023-12-18 RX ORDER — SODIUM CHLORIDE 9 MG/ML
INJECTION, SOLUTION INTRAVENOUS CONTINUOUS
OUTPATIENT
Start: 2024-01-10

## 2023-12-18 RX ORDER — SODIUM CHLORIDE 0.9 % (FLUSH) 0.9 %
5-40 SYRINGE (ML) INJECTION PRN
Status: CANCELLED | OUTPATIENT
Start: 2024-01-03

## 2023-12-18 RX ORDER — PROCHLORPERAZINE EDISYLATE 5 MG/ML
10 INJECTION INTRAMUSCULAR; INTRAVENOUS
Status: CANCELLED | OUTPATIENT
Start: 2023-12-27

## 2023-12-18 RX ORDER — SODIUM CHLORIDE 9 MG/ML
5-250 INJECTION, SOLUTION INTRAVENOUS PRN
Status: CANCELLED | OUTPATIENT
Start: 2023-12-27

## 2023-12-18 RX ORDER — MEPERIDINE HYDROCHLORIDE 25 MG/ML
12.5 INJECTION INTRAMUSCULAR; INTRAVENOUS; SUBCUTANEOUS PRN
Status: CANCELLED | OUTPATIENT
Start: 2023-12-27

## 2023-12-18 RX ORDER — DIPHENHYDRAMINE HYDROCHLORIDE 50 MG/ML
50 INJECTION INTRAMUSCULAR; INTRAVENOUS
OUTPATIENT
Start: 2024-01-10

## 2023-12-18 RX ORDER — ACETAMINOPHEN 325 MG/1
650 TABLET ORAL
Status: CANCELLED | OUTPATIENT
Start: 2024-01-03

## 2023-12-18 RX ORDER — HEPARIN 100 UNIT/ML
500 SYRINGE INTRAVENOUS PRN
Status: CANCELLED | OUTPATIENT
Start: 2023-12-27

## 2023-12-18 RX ORDER — DEXAMETHASONE SODIUM PHOSPHATE 10 MG/ML
10 INJECTION INTRAMUSCULAR; INTRAVENOUS ONCE
OUTPATIENT
Start: 2024-01-10 | End: 2024-01-10

## 2023-12-18 RX ORDER — ONDANSETRON 2 MG/ML
8 INJECTION INTRAMUSCULAR; INTRAVENOUS
Status: CANCELLED | OUTPATIENT
Start: 2023-12-27

## 2023-12-18 RX ORDER — FAMOTIDINE 10 MG/ML
20 INJECTION, SOLUTION INTRAVENOUS ONCE
OUTPATIENT
Start: 2024-01-10 | End: 2024-01-10

## 2023-12-18 RX ORDER — ALBUTEROL SULFATE 90 UG/1
4 AEROSOL, METERED RESPIRATORY (INHALATION) PRN
Status: CANCELLED | OUTPATIENT
Start: 2023-12-27

## 2023-12-18 RX ORDER — PROCHLORPERAZINE EDISYLATE 5 MG/ML
10 INJECTION INTRAMUSCULAR; INTRAVENOUS
OUTPATIENT
Start: 2024-01-10

## 2023-12-18 RX ORDER — DIPHENHYDRAMINE HCL 25 MG
50 CAPSULE ORAL ONCE
OUTPATIENT
Start: 2024-01-10 | End: 2024-01-10

## 2023-12-18 RX ORDER — DIPHENHYDRAMINE HYDROCHLORIDE 50 MG/ML
50 INJECTION INTRAMUSCULAR; INTRAVENOUS
Status: CANCELLED | OUTPATIENT
Start: 2024-01-03

## 2023-12-18 RX ORDER — PROCHLORPERAZINE EDISYLATE 5 MG/ML
10 INJECTION INTRAMUSCULAR; INTRAVENOUS
Status: CANCELLED | OUTPATIENT
Start: 2024-01-03

## 2023-12-18 RX ORDER — ONDANSETRON 2 MG/ML
8 INJECTION INTRAMUSCULAR; INTRAVENOUS
OUTPATIENT
Start: 2024-01-10

## 2023-12-18 RX ORDER — FAMOTIDINE 10 MG/ML
20 INJECTION, SOLUTION INTRAVENOUS
Status: CANCELLED | OUTPATIENT
Start: 2024-01-03

## 2023-12-18 RX ORDER — EPINEPHRINE 1 MG/ML
0.3 INJECTION, SOLUTION INTRAMUSCULAR; SUBCUTANEOUS PRN
Status: CANCELLED | OUTPATIENT
Start: 2023-12-27

## 2023-12-20 ENCOUNTER — HOSPITAL ENCOUNTER (OUTPATIENT)
Facility: HOSPITAL | Age: 53
Setting detail: INFUSION SERIES
Discharge: HOME | End: 2023-12-20
Payer: COMMERCIAL

## 2023-12-20 VITALS
OXYGEN SATURATION: 98 % | TEMPERATURE: 98.9 F | RESPIRATION RATE: 16 BRPM | SYSTOLIC BLOOD PRESSURE: 98 MMHG | HEIGHT: 64 IN | DIASTOLIC BLOOD PRESSURE: 58 MMHG | BODY MASS INDEX: 16.53 KG/M2 | HEART RATE: 80 BPM | WEIGHT: 96.8 LBS

## 2023-12-20 DIAGNOSIS — C50.912 INVASIVE DUCTAL CARCINOMA OF BREAST, FEMALE, LEFT (HCC): ICD-10-CM

## 2023-12-20 DIAGNOSIS — Z51.11 ENCOUNTER FOR ANTINEOPLASTIC CHEMOTHERAPY: ICD-10-CM

## 2023-12-20 DIAGNOSIS — Z76.89 PREVENTION OF CHEMOTHERAPY-INDUCED NEUTROPENIA: Primary | ICD-10-CM

## 2023-12-20 LAB
BASOPHILS # BLD: 0 K/UL (ref 0–0.1)
BASOPHILS NFR BLD: 2 % (ref 0–1)
DIFFERENTIAL METHOD BLD: ABNORMAL
EOSINOPHIL # BLD: 0.1 K/UL (ref 0–0.4)
EOSINOPHIL NFR BLD: 3 % (ref 0–7)
ERYTHROCYTE [DISTWIDTH] IN BLOOD BY AUTOMATED COUNT: 13.9 % (ref 11.5–14.5)
HCT VFR BLD AUTO: 29 % (ref 35–47)
HGB BLD-MCNC: 9.5 G/DL (ref 11.5–16)
IMM GRANULOCYTES # BLD AUTO: 0 K/UL (ref 0–0.04)
IMM GRANULOCYTES NFR BLD AUTO: 1 % (ref 0–0.5)
LYMPHOCYTES # BLD: 0.6 K/UL (ref 0.8–3.5)
LYMPHOCYTES NFR BLD: 32 % (ref 12–49)
MCH RBC QN AUTO: 31.7 PG (ref 26–34)
MCHC RBC AUTO-ENTMCNC: 32.8 G/DL (ref 30–36.5)
MCV RBC AUTO: 96.7 FL (ref 80–99)
MONOCYTES # BLD: 0.1 K/UL (ref 0–1)
MONOCYTES NFR BLD: 7 % (ref 5–13)
NEUTS SEG # BLD: 1.2 K/UL (ref 1.8–8)
NEUTS SEG NFR BLD: 55 % (ref 32–75)
NRBC # BLD: 0 K/UL (ref 0–0.01)
NRBC BLD-RTO: 0 PER 100 WBC
PLATELET # BLD AUTO: 180 K/UL (ref 150–400)
PMV BLD AUTO: 9.5 FL (ref 8.9–12.9)
RBC # BLD AUTO: 3 M/UL (ref 3.8–5.2)
RBC MORPH BLD: ABNORMAL
RBC MORPH BLD: ABNORMAL
WBC # BLD AUTO: 2 K/UL (ref 3.6–11)

## 2023-12-20 PROCEDURE — 85025 COMPLETE CBC W/AUTO DIFF WBC: CPT

## 2023-12-20 PROCEDURE — 6370000000 HC RX 637 (ALT 250 FOR IP): Performed by: INTERNAL MEDICINE

## 2023-12-20 PROCEDURE — 2580000003 HC RX 258: Performed by: INTERNAL MEDICINE

## 2023-12-20 PROCEDURE — 2500000003 HC RX 250 WO HCPCS: Performed by: INTERNAL MEDICINE

## 2023-12-20 PROCEDURE — 6360000002 HC RX W HCPCS: Performed by: INTERNAL MEDICINE

## 2023-12-20 PROCEDURE — 96413 CHEMO IV INFUSION 1 HR: CPT

## 2023-12-20 PROCEDURE — 36415 COLL VENOUS BLD VENIPUNCTURE: CPT

## 2023-12-20 PROCEDURE — 96375 TX/PRO/DX INJ NEW DRUG ADDON: CPT

## 2023-12-20 RX ORDER — DIPHENHYDRAMINE HCL 25 MG
50 CAPSULE ORAL ONCE
Status: COMPLETED | OUTPATIENT
Start: 2023-12-20 | End: 2023-12-20

## 2023-12-20 RX ORDER — SODIUM CHLORIDE 9 MG/ML
5-250 INJECTION, SOLUTION INTRAVENOUS PRN
Status: DISCONTINUED | OUTPATIENT
Start: 2023-12-20 | End: 2023-12-21 | Stop reason: HOSPADM

## 2023-12-20 RX ORDER — FAMOTIDINE 10 MG/ML
20 INJECTION, SOLUTION INTRAVENOUS ONCE
Status: COMPLETED | OUTPATIENT
Start: 2023-12-20 | End: 2023-12-20

## 2023-12-20 RX ORDER — DEXAMETHASONE SODIUM PHOSPHATE 10 MG/ML
10 INJECTION INTRAMUSCULAR; INTRAVENOUS ONCE
Status: COMPLETED | OUTPATIENT
Start: 2023-12-20 | End: 2023-12-20

## 2023-12-20 RX ORDER — SODIUM CHLORIDE 0.9 % (FLUSH) 0.9 %
5-40 SYRINGE (ML) INJECTION PRN
Status: DISCONTINUED | OUTPATIENT
Start: 2023-12-20 | End: 2023-12-21 | Stop reason: HOSPADM

## 2023-12-20 RX ADMIN — FAMOTIDINE 20 MG: 10 INJECTION INTRAVENOUS at 10:34

## 2023-12-20 RX ADMIN — PACLITAXEL 115 MG: 6 INJECTION, SOLUTION INTRAVENOUS at 11:40

## 2023-12-20 RX ADMIN — DEXAMETHASONE SODIUM PHOSPHATE 10 MG: 10 INJECTION INTRAMUSCULAR; INTRAVENOUS at 10:36

## 2023-12-20 RX ADMIN — SODIUM CHLORIDE 25 ML/HR: 9 INJECTION, SOLUTION INTRAVENOUS at 10:29

## 2023-12-20 RX ADMIN — DIPHENHYDRAMINE HYDROCHLORIDE 50 MG: 25 CAPSULE ORAL at 10:33

## 2023-12-26 ENCOUNTER — TELEPHONE (OUTPATIENT)
Age: 53
End: 2023-12-26

## 2023-12-26 DIAGNOSIS — Z51.11 ENCOUNTER FOR ANTINEOPLASTIC CHEMOTHERAPY: Primary | ICD-10-CM

## 2023-12-26 NOTE — TELEPHONE ENCOUNTER
Pt had echo scheduled in [facility] today and on her way there they called her to say there was no technician.  Pt now does not know what to do     CB# [phone]

## 2023-12-27 ENCOUNTER — RESEARCH ENCOUNTER (OUTPATIENT)
Age: 53
End: 2023-12-27

## 2023-12-27 ENCOUNTER — HOSPITAL ENCOUNTER (OUTPATIENT)
Facility: HOSPITAL | Age: 53
Setting detail: INFUSION SERIES
Discharge: HOME | End: 2023-12-27
Payer: COMMERCIAL

## 2023-12-27 ENCOUNTER — OFFICE VISIT (OUTPATIENT)
Age: 53
End: 2023-12-27

## 2023-12-27 VITALS
DIASTOLIC BLOOD PRESSURE: 53 MMHG | BODY MASS INDEX: 16.65 KG/M2 | HEART RATE: 84 BPM | TEMPERATURE: 98.7 F | OXYGEN SATURATION: 100 % | RESPIRATION RATE: 16 BRPM | SYSTOLIC BLOOD PRESSURE: 107 MMHG | WEIGHT: 97 LBS

## 2023-12-27 VITALS
HEART RATE: 84 BPM | OXYGEN SATURATION: 100 % | TEMPERATURE: 98.7 F | SYSTOLIC BLOOD PRESSURE: 103 MMHG | RESPIRATION RATE: 16 BRPM | DIASTOLIC BLOOD PRESSURE: 52 MMHG | WEIGHT: 97 LBS | BODY MASS INDEX: 16.56 KG/M2 | HEIGHT: 64 IN

## 2023-12-27 DIAGNOSIS — Z17.0 MALIGNANT NEOPLASM OF CENTRAL PORTION OF LEFT BREAST IN FEMALE, ESTROGEN RECEPTOR POSITIVE (HCC): Primary | ICD-10-CM

## 2023-12-27 DIAGNOSIS — K62.5 RECTAL BLEEDING: ICD-10-CM

## 2023-12-27 DIAGNOSIS — K52.1 DIARRHEA DUE TO DRUG: ICD-10-CM

## 2023-12-27 DIAGNOSIS — Z51.11 ENCOUNTER FOR ANTINEOPLASTIC CHEMOTHERAPY: Primary | ICD-10-CM

## 2023-12-27 DIAGNOSIS — C50.912 INVASIVE DUCTAL CARCINOMA OF BREAST, FEMALE, LEFT (HCC): ICD-10-CM

## 2023-12-27 DIAGNOSIS — Z76.89 PREVENTION OF CHEMOTHERAPY-INDUCED NEUTROPENIA: ICD-10-CM

## 2023-12-27 DIAGNOSIS — Z51.11 ENCOUNTER FOR ANTINEOPLASTIC CHEMOTHERAPY: ICD-10-CM

## 2023-12-27 DIAGNOSIS — C50.112 MALIGNANT NEOPLASM OF CENTRAL PORTION OF LEFT BREAST IN FEMALE, ESTROGEN RECEPTOR POSITIVE (HCC): Primary | ICD-10-CM

## 2023-12-27 DIAGNOSIS — R00.0 TACHYCARDIA: ICD-10-CM

## 2023-12-27 LAB
ALBUMIN SERPL-MCNC: 3.6 G/DL (ref 3.5–5)
ALBUMIN/GLOB SERPL: 1.2 (ref 1.1–2.2)
ALP SERPL-CCNC: 38 U/L (ref 45–117)
ALT SERPL-CCNC: 29 U/L (ref 12–78)
ANION GAP SERPL CALC-SCNC: 5 MMOL/L (ref 5–15)
AST SERPL-CCNC: 23 U/L (ref 15–37)
BASOPHILS # BLD: 0 K/UL (ref 0–0.1)
BASOPHILS NFR BLD: 2 % (ref 0–1)
BILIRUB SERPL-MCNC: 0.7 MG/DL (ref 0.2–1)
BUN SERPL-MCNC: 14 MG/DL (ref 6–20)
BUN/CREAT SERPL: 30 (ref 12–20)
CALCIUM SERPL-MCNC: 8.9 MG/DL (ref 8.5–10.1)
CHLORIDE SERPL-SCNC: 108 MMOL/L (ref 97–108)
CO2 SERPL-SCNC: 27 MMOL/L (ref 21–32)
CREAT SERPL-MCNC: 0.47 MG/DL (ref 0.55–1.02)
DIFFERENTIAL METHOD BLD: ABNORMAL
EOSINOPHIL # BLD: 0.1 K/UL (ref 0–0.4)
EOSINOPHIL NFR BLD: 3 % (ref 0–7)
ERYTHROCYTE [DISTWIDTH] IN BLOOD BY AUTOMATED COUNT: 13.2 % (ref 11.5–14.5)
GLOBULIN SER CALC-MCNC: 3 G/DL (ref 2–4)
GLUCOSE SERPL-MCNC: 113 MG/DL (ref 65–100)
HCT VFR BLD AUTO: 27.5 % (ref 35–47)
HGB BLD-MCNC: 9.2 G/DL (ref 11.5–16)
IMM GRANULOCYTES # BLD AUTO: 0 K/UL (ref 0–0.04)
IMM GRANULOCYTES NFR BLD AUTO: 1 % (ref 0–0.5)
LYMPHOCYTES # BLD: 0.6 K/UL (ref 0.8–3.5)
LYMPHOCYTES NFR BLD: 30 % (ref 12–49)
MCH RBC QN AUTO: 32.4 PG (ref 26–34)
MCHC RBC AUTO-ENTMCNC: 33.5 G/DL (ref 30–36.5)
MCV RBC AUTO: 96.8 FL (ref 80–99)
MONOCYTES # BLD: 0.1 K/UL (ref 0–1)
MONOCYTES NFR BLD: 6 % (ref 5–13)
NEUTS SEG # BLD: 1.2 K/UL (ref 1.8–8)
NEUTS SEG NFR BLD: 58 % (ref 32–75)
NRBC # BLD: 0 K/UL (ref 0–0.01)
NRBC BLD-RTO: 0 PER 100 WBC
PLATELET # BLD AUTO: 164 K/UL (ref 150–400)
PMV BLD AUTO: 9.4 FL (ref 8.9–12.9)
POTASSIUM SERPL-SCNC: 3.3 MMOL/L (ref 3.5–5.1)
PROT SERPL-MCNC: 6.6 G/DL (ref 6.4–8.2)
RBC # BLD AUTO: 2.84 M/UL (ref 3.8–5.2)
RBC MORPH BLD: ABNORMAL
SODIUM SERPL-SCNC: 140 MMOL/L (ref 136–145)
WBC # BLD AUTO: 2 K/UL (ref 3.6–11)

## 2023-12-27 PROCEDURE — 96375 TX/PRO/DX INJ NEW DRUG ADDON: CPT

## 2023-12-27 PROCEDURE — 2580000003 HC RX 258: Performed by: INTERNAL MEDICINE

## 2023-12-27 PROCEDURE — 96374 THER/PROPH/DIAG INJ IV PUSH: CPT

## 2023-12-27 PROCEDURE — 2500000003 HC RX 250 WO HCPCS: Performed by: INTERNAL MEDICINE

## 2023-12-27 PROCEDURE — 96417 CHEMO IV INFUS EACH ADDL SEQ: CPT

## 2023-12-27 PROCEDURE — 96413 CHEMO IV INFUSION 1 HR: CPT

## 2023-12-27 PROCEDURE — 80053 COMPREHEN METABOLIC PANEL: CPT

## 2023-12-27 PROCEDURE — 96415 CHEMO IV INFUSION ADDL HR: CPT

## 2023-12-27 PROCEDURE — 6370000000 HC RX 637 (ALT 250 FOR IP): Performed by: INTERNAL MEDICINE

## 2023-12-27 PROCEDURE — 85025 COMPLETE CBC W/AUTO DIFF WBC: CPT

## 2023-12-27 PROCEDURE — 6360000002 HC RX W HCPCS: Performed by: INTERNAL MEDICINE

## 2023-12-27 RX ORDER — SODIUM CHLORIDE 9 MG/ML
5-250 INJECTION, SOLUTION INTRAVENOUS PRN
Status: DISCONTINUED | OUTPATIENT
Start: 2023-12-27 | End: 2023-12-28 | Stop reason: HOSPADM

## 2023-12-27 RX ORDER — SODIUM CHLORIDE 0.9 % (FLUSH) 0.9 %
5-40 SYRINGE (ML) INJECTION PRN
Status: DISCONTINUED | OUTPATIENT
Start: 2023-12-27 | End: 2023-12-28 | Stop reason: HOSPADM

## 2023-12-27 RX ORDER — DIPHENHYDRAMINE HCL 25 MG
50 CAPSULE ORAL ONCE
Status: COMPLETED | OUTPATIENT
Start: 2023-12-27 | End: 2023-12-27

## 2023-12-27 RX ORDER — POTASSIUM CHLORIDE 750 MG/1
60 TABLET, FILM COATED, EXTENDED RELEASE ORAL ONCE
Status: COMPLETED | OUTPATIENT
Start: 2023-12-27 | End: 2023-12-27

## 2023-12-27 RX ORDER — FAMOTIDINE 10 MG/ML
20 INJECTION, SOLUTION INTRAVENOUS ONCE
Status: COMPLETED | OUTPATIENT
Start: 2023-12-27 | End: 2023-12-27

## 2023-12-27 RX ORDER — DEXAMETHASONE SODIUM PHOSPHATE 10 MG/ML
10 INJECTION INTRAMUSCULAR; INTRAVENOUS ONCE
Status: COMPLETED | OUTPATIENT
Start: 2023-12-27 | End: 2023-12-27

## 2023-12-27 RX ADMIN — SODIUM CHLORIDE, PRESERVATIVE FREE 20 ML: 5 INJECTION INTRAVENOUS at 15:40

## 2023-12-27 RX ADMIN — FAMOTIDINE 20 MG: 10 INJECTION, SOLUTION INTRAVENOUS at 13:47

## 2023-12-27 RX ADMIN — DIPHENHYDRAMINE HYDROCHLORIDE 50 MG: 25 CAPSULE ORAL at 13:45

## 2023-12-27 RX ADMIN — Medication 260 MG: at 11:57

## 2023-12-27 RX ADMIN — Medication 420 MG: at 12:43

## 2023-12-27 RX ADMIN — SODIUM CHLORIDE 25 ML/HR: 9 INJECTION, SOLUTION INTRAVENOUS at 11:53

## 2023-12-27 RX ADMIN — DEXAMETHASONE SODIUM PHOSPHATE 10 MG: 10 INJECTION INTRAMUSCULAR; INTRAVENOUS at 13:50

## 2023-12-27 RX ADMIN — POTASSIUM CHLORIDE 60 MEQ: 750 TABLET, FILM COATED, EXTENDED RELEASE ORAL at 13:45

## 2023-12-27 RX ADMIN — PACLITAXEL 115 MG: 6 INJECTION, SOLUTION INTRAVENOUS at 14:32

## 2023-12-27 ASSESSMENT — PAIN SCALES - GENERAL: PAINLEVEL_OUTOF10: 0

## 2023-12-27 NOTE — PROGRESS NOTES
[facility] [place] at [address], [address] [place], [place] [address]a [place]: [phone]  F: [phone]      Reason for Visit:   Jason Mcneal is a 47 y.o. female who is seen in follow up for breast cancer. Breast Surgeon: Dr. Kvng Young    Treatment History:   7/18/23 left breast stellate mass, core bx:  IDC, gr 2, 4 mm, ER + at 98%, [place] + at 54%, HER 2 negative at [facility] 1+ (SOHA ration 1; sig/cell 1.85), ki67 59%, DCIS, no LVI  Mammaprint shows luminal B, high risk, index -0.160  8/1/23 left axilla LN core bx:  + for breast cancer  Genetic testing , negative BRCA 1/2 by [employer] 2021  NSABP FB-12  [facility] [place], operated by [facility] 9/20/23 - 11/1/23  Paclitaxel/trastuzumab/pertuzumab 11/15/23-  Held taxol due to Nicholasberg 0.9 on c1d1  Held taxol due to Nicholasberg 0.7 on C2d1    History of Present Illness: An abnormal mammogram led to the pathology above. Interval history:  Patient presents today for follow up and treatment. Reports gr 1 fatigue, gr 1 hair loss, gr 1 chills, gr 1 insomnia, gr 1 diarrhea, gr 1 mouth sores, gr 1 SOB, gr 1 rapid HR, gr 1 headache, gr 1 rectal discomfort. FH:  mother with breast cancer, dx at age 80; maternal aunt with breast cancer, dx 47-56s and lung cancer; maternal aunt:  ovarian cancer, dx 45s; father with colon cancer dx 62s    Lmp 12/2022    Past Medical History:   Diagnosis Date    Anxiety     Invasive ductal carcinoma of breast, female, left ([address]) 07/24/2023 7/18/23: LEFT breast, Stellate Mass, Stereotatic biopsy: invasive and in situ carcinoma, grade 2. Largest tumor focus measured 4mm. No lymphovascular invasion seen. Prognostic markers pending.     Murmur     PONV (postoperative nausea and vomiting)       Past Surgical History:   Procedure Laterality Date    APPENDECTOMY  2000    COLONOSCOPY  2007    ENDOSCOPY, COLON, DIAGNOSTIC      GI  2007    COLONOSCOPY    NASAL SEPTUM SURGERY  2012    PORT SURGERY Right 8/29/2023    LUZ MARIA CATH PLACEMENT, ULTRASOUND GUIDED CORE

## 2023-12-27 NOTE — PROGRESS NOTES
Ying Keith is a 47 y.o. female here today to follow up for breast cancer    1. Have you been to the ER, urgent care clinic since your last visit? Hospitalized since your last visit? no    2. Have you seen or consulted any other health care providers outside of the [address] [place] [address] since your last visit? Include any pap smears or colon screening.  no

## 2023-12-27 NOTE — PROGRESS NOTES
150 - 400 K/uL    MPV 9.4 8.9 - 12.9 FL    Nucleated RBCs 0.0 0  WBC    nRBC 0.00 0.00 - 0.01 K/uL    Neutrophils % 58 32 - 75 %    Lymphocytes % 30 12 - 49 %    Monocytes % 6 5 - 13 %    Eosinophils % 3 0 - 7 %    Basophils % 2 (H) 0 - 1 %    Immature Granulocytes 1 (H) 0.0 - 0.5 %    Neutrophils Absolute 1.2 (L) 1.8 - 8.0 K/UL    Lymphocytes Absolute 0.6 (L) 0.8 - 3.5 K/UL    Monocytes Absolute 0.1 0.0 - 1.0 K/UL    Eosinophils Absolute 0.1 0.0 - 0.4 K/UL    Basophils Absolute 0.0 0.0 - 0.1 K/UL    Absolute Immature Granulocyte 0.0 0.00 - 0.04 K/UL    Differential Type SMEAR SCANNED      RBC Comment NORMOCYTIC, NORMOCHROMIC         Medications:  Medications Administered         ([facility] FB-12 pertuzumab) investigational 420 mg in sodium chloride 0.9 % 250 mL IVPB Admin Date  12/27/2023 Action  New Bag Dose  420 mg Rate  578 mL/hr Route  IntraVENous Administered By  Rickey Narayan RN        ([facility] FB-12 trastuzumab) investigational 260 mg in sodium chloride 0.9 % 250 mL IVPB Admin Date  12/27/2023 Action  New Bag Dose  260 mg Rate  574.8 mL/hr Route  IntraVENous Administered By  Rickey Narayan RN        0.9 % sodium chloride infusion Admin Date  12/27/2023 Action  New Bag Dose  25 mL/hr Rate  25 mL/hr Route  IntraVENous Administered By  Rickey Narayan RN        dexAMETHasone (DECADRON) injection 10 mg Admin Date  12/27/2023 Action  Given Dose  10 mg Rate   Route  IntraVENous Administered By  Rickey Narayan RN        diphenhydrAMINE (BENADRYL) capsule 50 mg Admin Date  12/27/2023 Action  Given Dose  50 mg Rate   Route  Oral Administered By  Rickey Narayan RN        famotidine (PEPCID) injection 20 mg Admin Date  12/27/2023 Action  Given Dose  20 mg Rate   Route  IntraVENous Administered By  Rickey Narayan RN        PACLitaxel (TAXOL) 115 mg in sodium chloride 0.9 % 250 mL chemo infusion Admin Date  12/27/2023 Action  New Bag Dose  115 mg Rate  294.2 mL/hr Route  IntraVENous Administered

## 2023-12-27 NOTE — PROGRESS NOTES
Patient in today for labs and follow-up visit with Dr. Irene Welsh. Today is C3D1 on study FB-12. Patient reports the following adverse events: gr 1 fatigue, gr 1 hair loss, gr 1 chills, gr 1 insomnia, gr 1 diarrhea, gr 1 mouth sores, gr 1 SOB, gr 1 rapid HR, gr 1 headache, gr 1 rectal discomfort. . Vital signs are stable. Patient to receive taxol, herceptin, pertuzumab. Next appt is 1/3/2024.

## 2024-01-02 ENCOUNTER — TELEPHONE (OUTPATIENT)
Age: 54
End: 2024-01-02

## 2024-01-02 ENCOUNTER — HOSPITAL ENCOUNTER (OUTPATIENT)
Facility: HOSPITAL | Age: 54
Discharge: HOME | End: 2024-01-04
Attending: INTERNAL MEDICINE
Payer: COMMERCIAL

## 2024-01-02 VITALS
BODY MASS INDEX: 16.56 KG/M2 | WEIGHT: 97 LBS | HEIGHT: 64 IN | DIASTOLIC BLOOD PRESSURE: 68 MMHG | SYSTOLIC BLOOD PRESSURE: 87 MMHG | HEART RATE: 87 BPM

## 2024-01-02 DIAGNOSIS — Z51.11 ENCOUNTER FOR ANTINEOPLASTIC CHEMOTHERAPY: ICD-10-CM

## 2024-01-02 LAB
ECHO AO ASC DIAM: 2.5 CM
ECHO AO ASCENDING AORTA INDEX: 1.74 CM/M2
ECHO BSA: 1.41 M2
ECHO LA DIAMETER INDEX: 1.53 CM/M2
ECHO LA DIAMETER: 2.2 CM
ECHO LV EF PHYSICIAN: 65 %
ECHO LV EJECTION FRACTION A2C: 66 %
ECHO LV FRACTIONAL SHORTENING: 29 % (ref 28–44)
ECHO LV GLOBAL LONGITUDINAL STRAIN (GLS): -20.6 %
ECHO LV INTERNAL DIMENSION DIASTOLE INDEX: 2.64 CM/M2
ECHO LV INTERNAL DIMENSION DIASTOLIC: 3.8 CM (ref 3.9–5.3)
ECHO LV INTERNAL DIMENSION SYSTOLIC INDEX: 1.88 CM/M2
ECHO LV INTERNAL DIMENSION SYSTOLIC: 2.7 CM
ECHO LV IVSD: 0.6 CM (ref 0.6–0.9)
ECHO LV MASS 2D: 58.9 G (ref 67–162)
ECHO LV MASS INDEX 2D: 40.9 G/M2 (ref 43–95)
ECHO LV POSTERIOR WALL DIASTOLIC: 0.6 CM (ref 0.6–0.9)
ECHO LV RELATIVE WALL THICKNESS RATIO: 0.32
ECHO LVOT AREA: 3.1 CM2
ECHO LVOT DIAM: 2 CM

## 2024-01-02 PROCEDURE — 93308 TTE F-UP OR LMTD: CPT | Performed by: INTERNAL MEDICINE

## 2024-01-02 PROCEDURE — 93356 MYOCRD STRAIN IMG SPCKL TRCK: CPT | Performed by: INTERNAL MEDICINE

## 2024-01-02 PROCEDURE — 93308 TTE F-UP OR LMTD: CPT

## 2024-01-02 NOTE — TELEPHONE ENCOUNTER
[facility] [place] at [facility]  [phone]    01/02/24 3:59 PM EST - Spoke with pt and discussed results

## 2024-01-02 NOTE — TELEPHONE ENCOUNTER
Patient called and stated that she wanted to know what the results of her echo wer. Requested a call back.       # [phone]

## 2024-01-03 ENCOUNTER — RESEARCH ENCOUNTER (OUTPATIENT)
Age: 54
End: 2024-01-03

## 2024-01-03 ENCOUNTER — HOSPITAL ENCOUNTER (OUTPATIENT)
Facility: HOSPITAL | Age: 54
Setting detail: INFUSION SERIES
Discharge: HOME | End: 2024-01-03
Payer: COMMERCIAL

## 2024-01-03 VITALS
DIASTOLIC BLOOD PRESSURE: 54 MMHG | WEIGHT: 97.1 LBS | OXYGEN SATURATION: 98 % | RESPIRATION RATE: 16 BRPM | TEMPERATURE: 98.4 F | BODY MASS INDEX: 16.58 KG/M2 | SYSTOLIC BLOOD PRESSURE: 99 MMHG | HEIGHT: 64 IN | HEART RATE: 87 BPM

## 2024-01-03 DIAGNOSIS — Z51.11 ENCOUNTER FOR ANTINEOPLASTIC CHEMOTHERAPY: ICD-10-CM

## 2024-01-03 DIAGNOSIS — Z76.89 PREVENTION OF CHEMOTHERAPY-INDUCED NEUTROPENIA: ICD-10-CM

## 2024-01-03 DIAGNOSIS — C50.912 INVASIVE DUCTAL CARCINOMA OF BREAST, FEMALE, LEFT (HCC): ICD-10-CM

## 2024-01-03 LAB
BASOPHILS # BLD: 0 K/UL (ref 0–0.1)
BASOPHILS NFR BLD: 2 % (ref 0–1)
DIFFERENTIAL METHOD BLD: ABNORMAL
EOSINOPHIL # BLD: 0 K/UL (ref 0–0.4)
EOSINOPHIL NFR BLD: 3 % (ref 0–7)
ERYTHROCYTE [DISTWIDTH] IN BLOOD BY AUTOMATED COUNT: 13.3 % (ref 11.5–14.5)
HCT VFR BLD AUTO: 28.5 % (ref 35–47)
HGB BLD-MCNC: 9.8 G/DL (ref 11.5–16)
IMM GRANULOCYTES # BLD AUTO: 0 K/UL (ref 0–0.04)
IMM GRANULOCYTES NFR BLD AUTO: 1 % (ref 0–0.5)
LYMPHOCYTES # BLD: 0.6 K/UL (ref 0.8–3.5)
LYMPHOCYTES NFR BLD: 35 % (ref 12–49)
MCH RBC QN AUTO: 32.6 PG (ref 26–34)
MCHC RBC AUTO-ENTMCNC: 34.4 G/DL (ref 30–36.5)
MCV RBC AUTO: 94.7 FL (ref 80–99)
MONOCYTES # BLD: 0.1 K/UL (ref 0–1)
MONOCYTES NFR BLD: 8 % (ref 5–13)
NEUTS SEG # BLD: 0.9 K/UL (ref 1.8–8)
NEUTS SEG NFR BLD: 51 % (ref 32–75)
NRBC # BLD: 0 K/UL (ref 0–0.01)
NRBC BLD-RTO: 0 PER 100 WBC
PLATELET # BLD AUTO: 193 K/UL (ref 150–400)
PMV BLD AUTO: 9.6 FL (ref 8.9–12.9)
RBC # BLD AUTO: 3.01 M/UL (ref 3.8–5.2)
RBC MORPH BLD: ABNORMAL
WBC # BLD AUTO: 1.6 K/UL (ref 3.6–11)

## 2024-01-03 PROCEDURE — 36591 DRAW BLOOD OFF VENOUS DEVICE: CPT

## 2024-01-03 PROCEDURE — 85025 COMPLETE CBC W/AUTO DIFF WBC: CPT

## 2024-01-03 ASSESSMENT — PAIN SCALES - GENERAL: PAINLEVEL_OUTOF10: 0

## 2024-01-03 NOTE — PROGRESS NOTES
[facility] VISIT NOTE  Date: January 3, 2024    Name: Madelin Parekh    MRN: [id number]         : 1970    Ms. Parekh Arrived ambulatory and in no distress for C3D8 of Taxol Regimen (HELD).  Assessment was completed and right chest wall port accessed without difficulty, labs drawn & sent for processing.       Treatment held today per Research for ANC 0.9.            Ms. Parekh's vitals were reviewed.  Patient Vitals for the past 12 hrs:   Temp Pulse Resp BP SpO2   24 0841 98.4 °F (36.9 °C) 87 16 (!) 99/54 98 %         Lab results were obtained and reviewed.  Recent Results (from the past 12 hour(s))   CBC With Auto Differential    Collection Time: 24  8:52 AM   Result Value Ref Range    WBC 1.6 (L) 3.6 - 11.0 K/uL    RBC 3.01 (L) 3.80 - 5.20 M/uL    Hemoglobin 9.8 (L) 11.5 - 16.0 g/dL    Hematocrit 28.5 (L) 35.0 - 47.0 %    MCV 94.7 80.0 - 99.0 FL    MCH 32.6 26.0 - 34.0 PG    MCHC 34.4 30.0 - 36.5 g/dL    RDW 13.3 11.5 - 14.5 %    Platelets 193 150 - 400 K/uL    MPV 9.6 8.9 - 12.9 FL    Nucleated RBCs 0.0 0  WBC    nRBC 0.00 0.00 - 0.01 K/uL    Neutrophils % 51 32 - 75 %    Lymphocytes % 35 12 - 49 %    Monocytes % 8 5 - 13 %    Eosinophils % 3 0 - 7 %    Basophils % 2 (H) 0 - 1 %    Immature Granulocytes 1 (H) 0.0 - 0.5 %    Neutrophils Absolute 0.9 (L) 1.8 - 8.0 K/UL    Lymphocytes Absolute 0.6 (L) 0.8 - 3.5 K/UL    Monocytes Absolute 0.1 0.0 - 1.0 K/UL    Eosinophils Absolute 0.0 0.0 - 0.4 K/UL    Basophils Absolute 0.0 0.0 - 0.1 K/UL    Absolute Immature Granulocyte 0.0 0.00 - 0.04 K/UL    Differential Type SMEAR SCANNED      RBC Comment OVALOCYTES  1+             Treatment held due to low ANC.  Patient educated on reasons for holding treatment.      RN notified pharmacist of held treatment.      [facility]. Pare[facility] in [facility] condition at 1006.   Port flushed & de-accessed per protocol.     TIFFANI LAZARO RN  January 3, 2024    Future

## 2024-01-10 ENCOUNTER — HOSPITAL ENCOUNTER (OUTPATIENT)
Facility: HOSPITAL | Age: 54
Setting detail: INFUSION SERIES
Discharge: HOME | End: 2024-01-10
Payer: COMMERCIAL

## 2024-01-10 ENCOUNTER — RESEARCH ENCOUNTER (OUTPATIENT)
Facility: HOSPITAL | Age: 54
End: 2024-01-10

## 2024-01-10 ENCOUNTER — TELEPHONE (OUTPATIENT)
Age: 54
End: 2024-01-10

## 2024-01-10 VITALS
WEIGHT: 96.9 LBS | OXYGEN SATURATION: 97 % | BODY MASS INDEX: 16.54 KG/M2 | DIASTOLIC BLOOD PRESSURE: 60 MMHG | HEART RATE: 82 BPM | RESPIRATION RATE: 17 BRPM | SYSTOLIC BLOOD PRESSURE: 108 MMHG | TEMPERATURE: 98.4 F | HEIGHT: 64 IN

## 2024-01-10 DIAGNOSIS — C50.112 MALIGNANT NEOPLASM OF CENTRAL PORTION OF LEFT BREAST IN FEMALE, ESTROGEN RECEPTOR POSITIVE (HCC): ICD-10-CM

## 2024-01-10 DIAGNOSIS — Z76.89 PREVENTION OF CHEMOTHERAPY-INDUCED NEUTROPENIA: ICD-10-CM

## 2024-01-10 DIAGNOSIS — Z51.11 ENCOUNTER FOR ANTINEOPLASTIC CHEMOTHERAPY: ICD-10-CM

## 2024-01-10 DIAGNOSIS — Z17.0 MALIGNANT NEOPLASM OF CENTRAL PORTION OF LEFT BREAST IN FEMALE, ESTROGEN RECEPTOR POSITIVE (HCC): ICD-10-CM

## 2024-01-10 DIAGNOSIS — T45.1X5A CHEMOTHERAPY INDUCED NEUTROPENIA (HCC): Primary | ICD-10-CM

## 2024-01-10 DIAGNOSIS — D70.1 CHEMOTHERAPY INDUCED NEUTROPENIA (HCC): Primary | ICD-10-CM

## 2024-01-10 DIAGNOSIS — C50.912 INVASIVE DUCTAL CARCINOMA OF BREAST, FEMALE, LEFT (HCC): ICD-10-CM

## 2024-01-10 LAB
BASOPHILS # BLD: 0 K/UL (ref 0–0.1)
BASOPHILS NFR BLD: 1 % (ref 0–1)
DIFFERENTIAL METHOD BLD: ABNORMAL
EOSINOPHIL # BLD: 0 K/UL (ref 0–0.4)
EOSINOPHIL NFR BLD: 2 % (ref 0–7)
ERYTHROCYTE [DISTWIDTH] IN BLOOD BY AUTOMATED COUNT: 12.9 % (ref 11.5–14.5)
HCT VFR BLD AUTO: 30.7 % (ref 35–47)
HGB BLD-MCNC: 10.2 G/DL (ref 11.5–16)
IMM GRANULOCYTES # BLD AUTO: 0 K/UL (ref 0–0.04)
IMM GRANULOCYTES NFR BLD AUTO: 1 % (ref 0–0.5)
LYMPHOCYTES # BLD: 0.5 K/UL (ref 0.8–3.5)
LYMPHOCYTES NFR BLD: 31 % (ref 12–49)
MCH RBC QN AUTO: 31.6 PG (ref 26–34)
MCHC RBC AUTO-ENTMCNC: 33.2 G/DL (ref 30–36.5)
MCV RBC AUTO: 95 FL (ref 80–99)
MONOCYTES # BLD: 0.3 K/UL (ref 0–1)
MONOCYTES NFR BLD: 16 % (ref 5–13)
NEUTS SEG # BLD: 0.9 K/UL (ref 1.8–8)
NEUTS SEG NFR BLD: 49 % (ref 32–75)
NRBC # BLD: 0 K/UL (ref 0–0.01)
NRBC BLD-RTO: 0 PER 100 WBC
PLATELET # BLD AUTO: 184 K/UL (ref 150–400)
PMV BLD AUTO: 8.8 FL (ref 8.9–12.9)
RBC # BLD AUTO: 3.23 M/UL (ref 3.8–5.2)
RBC MORPH BLD: ABNORMAL
WBC # BLD AUTO: 1.7 K/UL (ref 3.6–11)

## 2024-01-10 PROCEDURE — 36591 DRAW BLOOD OFF VENOUS DEVICE: CPT

## 2024-01-10 PROCEDURE — 36415 COLL VENOUS BLD VENIPUNCTURE: CPT

## 2024-01-10 PROCEDURE — 85025 COMPLETE CBC W/AUTO DIFF WBC: CPT

## 2024-01-10 RX ORDER — DIPHENHYDRAMINE HYDROCHLORIDE 50 MG/ML
50 INJECTION INTRAMUSCULAR; INTRAVENOUS
OUTPATIENT
Start: 2024-01-31

## 2024-01-10 RX ORDER — ACETAMINOPHEN 325 MG/1
650 TABLET ORAL
Status: CANCELLED | OUTPATIENT
Start: 2024-01-11

## 2024-01-10 RX ORDER — DIPHENHYDRAMINE HYDROCHLORIDE 50 MG/ML
50 INJECTION INTRAMUSCULAR; INTRAVENOUS
Status: CANCELLED | OUTPATIENT
Start: 2024-01-17

## 2024-01-10 RX ORDER — ALBUTEROL SULFATE 90 UG/1
4 AEROSOL, METERED RESPIRATORY (INHALATION) PRN
Status: CANCELLED | OUTPATIENT
Start: 2024-01-17

## 2024-01-10 RX ORDER — ONDANSETRON 2 MG/ML
8 INJECTION INTRAMUSCULAR; INTRAVENOUS
Status: CANCELLED | OUTPATIENT
Start: 2024-01-24

## 2024-01-10 RX ORDER — ONDANSETRON 2 MG/ML
8 INJECTION INTRAMUSCULAR; INTRAVENOUS
OUTPATIENT
Start: 2024-01-31

## 2024-01-10 RX ORDER — FAMOTIDINE 10 MG/ML
20 INJECTION, SOLUTION INTRAVENOUS
Status: CANCELLED | OUTPATIENT
Start: 2024-01-17

## 2024-01-10 RX ORDER — ONDANSETRON 2 MG/ML
8 INJECTION INTRAMUSCULAR; INTRAVENOUS
Status: CANCELLED | OUTPATIENT
Start: 2024-01-11

## 2024-01-10 RX ORDER — SODIUM CHLORIDE 9 MG/ML
INJECTION, SOLUTION INTRAVENOUS CONTINUOUS
Status: CANCELLED | OUTPATIENT
Start: 2024-01-17

## 2024-01-10 RX ORDER — SODIUM CHLORIDE 9 MG/ML
INJECTION, SOLUTION INTRAVENOUS CONTINUOUS
Status: CANCELLED | OUTPATIENT
Start: 2024-01-11

## 2024-01-10 RX ORDER — SODIUM CHLORIDE 0.9 % (FLUSH) 0.9 %
5-40 SYRINGE (ML) INJECTION PRN
OUTPATIENT
Start: 2024-01-31

## 2024-01-10 RX ORDER — DIPHENHYDRAMINE HCL 25 MG
50 CAPSULE ORAL ONCE
OUTPATIENT
Start: 2024-01-31 | End: 2024-01-31

## 2024-01-10 RX ORDER — FAMOTIDINE 10 MG/ML
20 INJECTION, SOLUTION INTRAVENOUS
Status: CANCELLED | OUTPATIENT
Start: 2024-01-24

## 2024-01-10 RX ORDER — HEPARIN 100 UNIT/ML
500 SYRINGE INTRAVENOUS PRN
Status: CANCELLED | OUTPATIENT
Start: 2024-01-24

## 2024-01-10 RX ORDER — ACETAMINOPHEN 325 MG/1
650 TABLET ORAL
Status: CANCELLED | OUTPATIENT
Start: 2024-01-24

## 2024-01-10 RX ORDER — SODIUM CHLORIDE 9 MG/ML
INJECTION, SOLUTION INTRAVENOUS CONTINUOUS
Status: CANCELLED | OUTPATIENT
Start: 2024-01-24

## 2024-01-10 RX ORDER — DEXAMETHASONE SODIUM PHOSPHATE 10 MG/ML
10 INJECTION INTRAMUSCULAR; INTRAVENOUS ONCE
Status: CANCELLED | OUTPATIENT
Start: 2024-01-24 | End: 2024-01-24

## 2024-01-10 RX ORDER — PROCHLORPERAZINE EDISYLATE 5 MG/ML
10 INJECTION INTRAMUSCULAR; INTRAVENOUS
OUTPATIENT
Start: 2024-01-31

## 2024-01-10 RX ORDER — DIPHENHYDRAMINE HCL 25 MG
50 CAPSULE ORAL ONCE
Status: CANCELLED | OUTPATIENT
Start: 2024-01-24 | End: 2024-01-24

## 2024-01-10 RX ORDER — EPINEPHRINE 1 MG/ML
0.3 INJECTION, SOLUTION INTRAMUSCULAR; SUBCUTANEOUS PRN
OUTPATIENT
Start: 2024-01-31

## 2024-01-10 RX ORDER — MEPERIDINE HYDROCHLORIDE 25 MG/ML
12.5 INJECTION INTRAMUSCULAR; INTRAVENOUS; SUBCUTANEOUS PRN
Status: CANCELLED | OUTPATIENT
Start: 2024-01-24

## 2024-01-10 RX ORDER — DIPHENHYDRAMINE HYDROCHLORIDE 50 MG/ML
50 INJECTION INTRAMUSCULAR; INTRAVENOUS
Status: CANCELLED | OUTPATIENT
Start: 2024-01-11

## 2024-01-10 RX ORDER — ONDANSETRON 2 MG/ML
8 INJECTION INTRAMUSCULAR; INTRAVENOUS
Status: CANCELLED | OUTPATIENT
Start: 2024-01-17

## 2024-01-10 RX ORDER — SODIUM CHLORIDE 9 MG/ML
5-250 INJECTION, SOLUTION INTRAVENOUS PRN
Status: CANCELLED | OUTPATIENT
Start: 2024-01-17

## 2024-01-10 RX ORDER — ACETAMINOPHEN 325 MG/1
650 TABLET ORAL
OUTPATIENT
Start: 2024-01-31

## 2024-01-10 RX ORDER — SODIUM CHLORIDE 9 MG/ML
INJECTION, SOLUTION INTRAVENOUS CONTINUOUS
OUTPATIENT
Start: 2024-01-31

## 2024-01-10 RX ORDER — HEPARIN 100 UNIT/ML
500 SYRINGE INTRAVENOUS PRN
Status: CANCELLED | OUTPATIENT
Start: 2024-01-17

## 2024-01-10 RX ORDER — ACETAMINOPHEN 325 MG/1
650 TABLET ORAL
Status: CANCELLED | OUTPATIENT
Start: 2024-01-17

## 2024-01-10 RX ORDER — SODIUM CHLORIDE 9 MG/ML
5-250 INJECTION, SOLUTION INTRAVENOUS PRN
OUTPATIENT
Start: 2024-01-31

## 2024-01-10 RX ORDER — FAMOTIDINE 10 MG/ML
20 INJECTION, SOLUTION INTRAVENOUS ONCE
OUTPATIENT
Start: 2024-01-31 | End: 2024-01-31

## 2024-01-10 RX ORDER — DEXAMETHASONE SODIUM PHOSPHATE 10 MG/ML
10 INJECTION INTRAMUSCULAR; INTRAVENOUS ONCE
OUTPATIENT
Start: 2024-01-31 | End: 2024-01-31

## 2024-01-10 RX ORDER — DIPHENHYDRAMINE HYDROCHLORIDE 50 MG/ML
50 INJECTION INTRAMUSCULAR; INTRAVENOUS
Status: CANCELLED | OUTPATIENT
Start: 2024-01-24

## 2024-01-10 RX ORDER — MEPERIDINE HYDROCHLORIDE 25 MG/ML
12.5 INJECTION INTRAMUSCULAR; INTRAVENOUS; SUBCUTANEOUS PRN
OUTPATIENT
Start: 2024-01-31

## 2024-01-10 RX ORDER — EPINEPHRINE 1 MG/ML
0.3 INJECTION, SOLUTION INTRAMUSCULAR; SUBCUTANEOUS PRN
Status: CANCELLED | OUTPATIENT
Start: 2024-01-17

## 2024-01-10 RX ORDER — FAMOTIDINE 10 MG/ML
20 INJECTION, SOLUTION INTRAVENOUS ONCE
Status: CANCELLED | OUTPATIENT
Start: 2024-01-24 | End: 2024-01-24

## 2024-01-10 RX ORDER — ALBUTEROL SULFATE 90 UG/1
4 AEROSOL, METERED RESPIRATORY (INHALATION) PRN
OUTPATIENT
Start: 2024-01-31

## 2024-01-10 RX ORDER — ALBUTEROL SULFATE 90 UG/1
4 AEROSOL, METERED RESPIRATORY (INHALATION) PRN
Status: CANCELLED | OUTPATIENT
Start: 2024-01-24

## 2024-01-10 RX ORDER — PROCHLORPERAZINE EDISYLATE 5 MG/ML
10 INJECTION INTRAMUSCULAR; INTRAVENOUS
Status: CANCELLED | OUTPATIENT
Start: 2024-01-24

## 2024-01-10 RX ORDER — SODIUM CHLORIDE 0.9 % (FLUSH) 0.9 %
5-40 SYRINGE (ML) INJECTION PRN
Status: CANCELLED | OUTPATIENT
Start: 2024-01-24

## 2024-01-10 RX ORDER — ALBUTEROL SULFATE 90 UG/1
4 AEROSOL, METERED RESPIRATORY (INHALATION) PRN
Status: CANCELLED | OUTPATIENT
Start: 2024-01-11

## 2024-01-10 RX ORDER — FAMOTIDINE 10 MG/ML
20 INJECTION, SOLUTION INTRAVENOUS
OUTPATIENT
Start: 2024-01-31

## 2024-01-10 RX ORDER — EPINEPHRINE 1 MG/ML
0.3 INJECTION, SOLUTION, CONCENTRATE INTRAVENOUS PRN
Status: CANCELLED | OUTPATIENT
Start: 2024-01-11

## 2024-01-10 RX ORDER — PROCHLORPERAZINE EDISYLATE 5 MG/ML
10 INJECTION INTRAMUSCULAR; INTRAVENOUS
Status: CANCELLED | OUTPATIENT
Start: 2024-01-17

## 2024-01-10 RX ORDER — HEPARIN 100 UNIT/ML
500 SYRINGE INTRAVENOUS PRN
OUTPATIENT
Start: 2024-01-31

## 2024-01-10 RX ORDER — SODIUM CHLORIDE 9 MG/ML
5-250 INJECTION, SOLUTION INTRAVENOUS PRN
Status: CANCELLED | OUTPATIENT
Start: 2024-01-24

## 2024-01-10 RX ORDER — EPINEPHRINE 1 MG/ML
0.3 INJECTION, SOLUTION INTRAMUSCULAR; SUBCUTANEOUS PRN
Status: CANCELLED | OUTPATIENT
Start: 2024-01-24

## 2024-01-10 RX ORDER — MEPERIDINE HYDROCHLORIDE 25 MG/ML
12.5 INJECTION INTRAMUSCULAR; INTRAVENOUS; SUBCUTANEOUS PRN
Status: CANCELLED | OUTPATIENT
Start: 2024-01-17

## 2024-01-10 ASSESSMENT — PAIN SCALES - GENERAL: PAINLEVEL_OUTOF10: 0

## 2024-01-10 NOTE — PROGRESS NOTES
hospitals Chemo Progress Note    Date: January 10, 2024         Ms. Parekh Arrived to hospitals for  Taxol(HELD) ambulatory in stable condition.  Assessment was completed and port accessed by Yen RASCON. Labs drawn and sent for processing.     Per research, treatment held today for ANC 0.9. Pharmacy notified.     Ms. Parekh's vitals were reviewed.  Patient Vitals for the past 12 hrs:   Temp Pulse Resp BP SpO2   01/10/24 0815 98.4 °F (36.9 °C) 82 17 108/60 97 %         Lab results were obtained and reviewed.    Recent Results (from the past 12 hour(s))   CBC With Auto Differential    Collection Time: 01/10/24  8:40 AM   Result Value Ref Range    WBC 1.7 (L) 3.6 - 11.0 K/uL    RBC 3.23 (L) 3.80 - 5.20 M/uL    Hemoglobin 10.2 (L) 11.5 - 16.0 g/dL    Hematocrit 30.7 (L) 35.0 - 47.0 %    MCV 95.0 80.0 - 99.0 FL    MCH 31.6 26.0 - 34.0 PG    MCHC 33.2 30.0 - 36.5 g/dL    RDW 12.9 11.5 - 14.5 %    Platelets 184 150 - 400 K/uL    MPV 8.8 (L) 8.9 - 12.9 FL    Nucleated RBCs 0.0 0  WBC    nRBC 0.00 0.00 - 0.01 K/uL    Neutrophils % 49 32 - 75 %    Lymphocytes % 31 12 - 49 %    Monocytes % 16 (H) 5 - 13 %    Eosinophils % 2 0 - 7 %    Basophils % 1 0 - 1 %    Immature Granulocytes 1 (H) 0.0 - 0.5 %    Neutrophils Absolute 0.9 (L) 1.8 - 8.0 K/UL    Lymphocytes Absolute 0.5 (L) 0.8 - 3.5 K/UL    Monocytes Absolute 0.3 0.0 - 1.0 K/UL    Eosinophils Absolute 0.0 0.0 - 0.4 K/UL    Basophils Absolute 0.0 0.0 - 0.1 K/UL    Absolute Immature Granulocyte 0.0 0.00 - 0.04 K/UL    Differential Type SMEAR SCANNED      RBC Comment OVALOCYTES  PRESENT             Two nurses verified prior to administering: Drug name, Drug dose, Infusion volume or drug volume when prepared in a syringe, Rate of administration, Route of administration, Expiration dates and/or times, Appearance and physical integrity of the drugs, Rate set on infusion pump, when used, and Sequencing of drug administration.    1015 Patient tolerated treatment well.  Port maintained

## 2024-01-10 NOTE — TELEPHONE ENCOUNTER
[place] [place] at [facility]  [phone]    Called to patient. Discussed continued neutropenia and plan for filgrastim 300mcg daily 1/11 - 1/12/24 as well as sending script to specialty pharmacy for 300 mcg for 3 days after each weekly chemotherapy. Patient verbalized understanding and had no further questions or concerns.     Marianna Gonsalves, NP

## 2024-01-10 NOTE — TELEPHONE ENCOUNTER
[place] [place] at [facility]  [phone]    01/10/24 1:28 PM EST - Called patient after she spoke with our NP and advised her that we got her scheduled tomorrow and Friday at 2:30 PM both days.

## 2024-01-10 NOTE — TELEPHONE ENCOUNTER
Patient called stating she wasn't able to get treatment. Patent would like to speak with someone about the side effects from missing  treatment. Request a call back.       # [phone]

## 2024-01-11 ENCOUNTER — HOSPITAL ENCOUNTER (OUTPATIENT)
Facility: HOSPITAL | Age: 54
Setting detail: INFUSION SERIES
Discharge: HOME | End: 2024-01-11
Payer: COMMERCIAL

## 2024-01-11 VITALS
SYSTOLIC BLOOD PRESSURE: 123 MMHG | TEMPERATURE: 97.6 F | DIASTOLIC BLOOD PRESSURE: 75 MMHG | HEART RATE: 75 BPM | RESPIRATION RATE: 18 BRPM | OXYGEN SATURATION: 98 %

## 2024-01-11 DIAGNOSIS — D70.1 CHEMOTHERAPY INDUCED NEUTROPENIA (HCC): Primary | ICD-10-CM

## 2024-01-11 DIAGNOSIS — T45.1X5A CHEMOTHERAPY INDUCED NEUTROPENIA (HCC): Primary | ICD-10-CM

## 2024-01-11 PROCEDURE — 96372 THER/PROPH/DIAG INJ SC/IM: CPT

## 2024-01-11 PROCEDURE — 6360000002 HC RX W HCPCS: Performed by: NURSE PRACTITIONER

## 2024-01-11 RX ORDER — SODIUM CHLORIDE 9 MG/ML
INJECTION, SOLUTION INTRAVENOUS CONTINUOUS
Status: CANCELLED | OUTPATIENT
Start: 2024-01-12

## 2024-01-11 RX ORDER — EPINEPHRINE 1 MG/ML
0.3 INJECTION, SOLUTION INTRAMUSCULAR; SUBCUTANEOUS PRN
Status: CANCELLED | OUTPATIENT
Start: 2024-01-12

## 2024-01-11 RX ORDER — ONDANSETRON 2 MG/ML
8 INJECTION INTRAMUSCULAR; INTRAVENOUS
Status: CANCELLED | OUTPATIENT
Start: 2024-01-12

## 2024-01-11 RX ORDER — ALBUTEROL SULFATE 90 UG/1
4 AEROSOL, METERED RESPIRATORY (INHALATION) PRN
Status: CANCELLED | OUTPATIENT
Start: 2024-01-12

## 2024-01-11 RX ORDER — ACETAMINOPHEN 325 MG/1
650 TABLET ORAL
Status: CANCELLED | OUTPATIENT
Start: 2024-01-12

## 2024-01-11 RX ORDER — FAMOTIDINE 10 MG/ML
20 INJECTION, SOLUTION INTRAVENOUS
Status: CANCELLED | OUTPATIENT
Start: 2024-01-12

## 2024-01-11 RX ORDER — DIPHENHYDRAMINE HYDROCHLORIDE 50 MG/ML
50 INJECTION INTRAMUSCULAR; INTRAVENOUS
Status: CANCELLED | OUTPATIENT
Start: 2024-01-12

## 2024-01-11 RX ADMIN — FILGRASTIM-AAFI 300 MCG: 300 INJECTION, SOLUTION SUBCUTANEOUS at 14:36

## 2024-01-11 ASSESSMENT — PAIN SCALES - GENERAL: PAINLEVEL_OUTOF10: 0

## 2024-01-11 NOTE — PROGRESS NOTES
[facility] Progress Note    Date: 2024    Name: Madelin Parekh    MRN: [id number]         : 1970    Ms. Parekh Arrived ambulatory and in no distress for Filgrastim Regimen.  Assessment was completed. Patient states she is having some stomach issue, gill notified. Suggestions for patient to take pepcid, simethicone (gas-x) or nausea medicine. Patient verbalized understanding.      Ms. Parekh's vitals were reviewed.  Vitals:    24 1429   BP: 123/75   Pulse: 75   Resp: 18   Temp: 97.6 °F (36.4 °C)   SpO2: 98%       Medications:  Medications Administered         filgrastim-aafi (NIVESTYM) injection 300 mcg Admin Date  2024 Action  Given Dose  300 mcg Route  SubCUTAneous Administered By  Mel Bajwa RN            Given in left arm.     Ms. Parekh tolerated treatment well and [facility]. She is to return on     Future Appointments   Date Time Provider Department Center   2024  2:30 PM SS INF5 CH4 <1H [facility]   2024  9:00 AM SS INF4 CH3 <4H [facility]   2024  9:30 AM Chintan Matt MD ONC BS AMB   2024  9:00 AM SS INF3 CH3 <4H [facility]   2024  9:00 AM SS INF3 CH3 <4H [facility]   2024  9:00 AM SS INF1 CH3 <4H [facility]   2024  9:30 AM Chintan Matt MD [facility] BS AMB   2024  8:30 AM SS INF3 CH3 <4H [facility]   2024  8:00 AM Ton Forrest Jr, MD [facility] BS AMB

## 2024-01-12 ENCOUNTER — HOSPITAL ENCOUNTER (OUTPATIENT)
Facility: HOSPITAL | Age: 54
Setting detail: INFUSION SERIES
Discharge: HOME | End: 2024-01-12
Payer: COMMERCIAL

## 2024-01-12 VITALS
HEART RATE: 70 BPM | OXYGEN SATURATION: 99 % | RESPIRATION RATE: 18 BRPM | DIASTOLIC BLOOD PRESSURE: 56 MMHG | TEMPERATURE: 97.8 F | SYSTOLIC BLOOD PRESSURE: 100 MMHG

## 2024-01-12 DIAGNOSIS — T45.1X5A CHEMOTHERAPY INDUCED NEUTROPENIA (HCC): Primary | ICD-10-CM

## 2024-01-12 DIAGNOSIS — D70.1 CHEMOTHERAPY INDUCED NEUTROPENIA (HCC): Primary | ICD-10-CM

## 2024-01-12 PROCEDURE — 6360000002 HC RX W HCPCS: Performed by: NURSE PRACTITIONER

## 2024-01-12 PROCEDURE — 96372 THER/PROPH/DIAG INJ SC/IM: CPT

## 2024-01-12 RX ORDER — ONDANSETRON 2 MG/ML
8 INJECTION INTRAMUSCULAR; INTRAVENOUS
OUTPATIENT
Start: 2024-01-12

## 2024-01-12 RX ORDER — DIPHENHYDRAMINE HYDROCHLORIDE 50 MG/ML
50 INJECTION INTRAMUSCULAR; INTRAVENOUS
OUTPATIENT
Start: 2024-01-12

## 2024-01-12 RX ORDER — ALBUTEROL SULFATE 90 UG/1
4 AEROSOL, METERED RESPIRATORY (INHALATION) PRN
OUTPATIENT
Start: 2024-01-12

## 2024-01-12 RX ORDER — ACETAMINOPHEN 325 MG/1
650 TABLET ORAL
OUTPATIENT
Start: 2024-01-12

## 2024-01-12 RX ORDER — LORAZEPAM 2 MG/ML
0.5 INJECTION INTRAMUSCULAR ONCE
Status: DISCONTINUED | OUTPATIENT
Start: 2024-01-12 | End: 2024-01-12

## 2024-01-12 RX ORDER — FAMOTIDINE 10 MG/ML
20 INJECTION, SOLUTION INTRAVENOUS
OUTPATIENT
Start: 2024-01-12

## 2024-01-12 RX ORDER — EPINEPHRINE 1 MG/ML
0.3 INJECTION, SOLUTION INTRAMUSCULAR; SUBCUTANEOUS PRN
OUTPATIENT
Start: 2024-01-12

## 2024-01-12 RX ORDER — SODIUM CHLORIDE 9 MG/ML
INJECTION, SOLUTION INTRAVENOUS CONTINUOUS
OUTPATIENT
Start: 2024-01-12

## 2024-01-12 RX ADMIN — FILGRASTIM-AAFI 300 MCG: 300 INJECTION, SOLUTION SUBCUTANEOUS at 14:52

## 2024-01-12 ASSESSMENT — PAIN SCALES - GENERAL: PAINLEVEL_OUTOF10: 0

## 2024-01-12 NOTE — PROGRESS NOTES
[facility] Progress Note    Date: 2024    Name: Madelin Parekh    MRN: [id number]         : 1970    Ms. Parekh Arrived ambulatory and in no distress for filgrastim Injection.  Assessment was completed, no acute issues at this time, patient reports continued GI issues.       Ms. Parekh's vitals were reviewed.  Vitals:    24 1445   BP: (!) 100/56   Pulse: 70   Resp: 18   Temp: 97.8 °F (36.6 °C)   SpO2: 99%             Medications:  Medications Administered         filgrastim-aafi (NIVESTYM) injection 300 mcg Admin Date  2024 Action  Given Dose  300 mcg Route  SubCUTAneous Administered By  Cinthia Lee, RN             Ms. Parekh tolerated treatment well and was discharged [facility] in stable condition.   Patient is aware of next appointment.    Cinthia Lee RN    2024  Future Appointments   Date Time Provider Department Center   2024  9:00 AM SS INF4 CH3 <4H [facility]   2024  9:30 AM Chintan Matt MD [facility]   2024  9:00 AM SS INF3 CH3 <4H [facility]   2024  9:00 AM SS INF3 CH3 <4H [facility]   2024  9:00 AM SS INF1 CH3 <4H [facility]   2024  9:30 [facility]   2024  8:30 AM SS INF3 CH3 <4H [facility]   2024  8:00 AM Ton Forrest Jr, MD [facility] AMB

## 2024-01-15 ENCOUNTER — PREP FOR PROCEDURE (OUTPATIENT)
Age: 54
End: 2024-01-15

## 2024-01-15 DIAGNOSIS — Z17.0 MALIGNANT NEOPLASM OF LEFT BREAST IN FEMALE, ESTROGEN RECEPTOR POSITIVE, UNSPECIFIED SITE OF BREAST (HCC): Primary | ICD-10-CM

## 2024-01-15 DIAGNOSIS — C50.912 MALIGNANT NEOPLASM OF LEFT BREAST IN FEMALE, ESTROGEN RECEPTOR POSITIVE, UNSPECIFIED SITE OF BREAST (HCC): Primary | ICD-10-CM

## 2024-01-17 ENCOUNTER — HOSPITAL ENCOUNTER (OUTPATIENT)
Facility: HOSPITAL | Age: 54
Setting detail: INFUSION SERIES
Discharge: HOME | End: 2024-01-17
Payer: COMMERCIAL

## 2024-01-17 ENCOUNTER — OFFICE VISIT (OUTPATIENT)
Age: 54
End: 2024-01-17

## 2024-01-17 ENCOUNTER — RESEARCH ENCOUNTER (OUTPATIENT)
Age: 54
End: 2024-01-17

## 2024-01-17 VITALS
SYSTOLIC BLOOD PRESSURE: 93 MMHG | BODY MASS INDEX: 15.62 KG/M2 | OXYGEN SATURATION: 98 % | HEART RATE: 64 BPM | DIASTOLIC BLOOD PRESSURE: 60 MMHG | HEIGHT: 64 IN | WEIGHT: 91.5 LBS | RESPIRATION RATE: 17 BRPM | TEMPERATURE: 98 F

## 2024-01-17 VITALS
BODY MASS INDEX: 16.48 KG/M2 | RESPIRATION RATE: 16 BRPM | SYSTOLIC BLOOD PRESSURE: 100 MMHG | TEMPERATURE: 98 F | WEIGHT: 96 LBS | DIASTOLIC BLOOD PRESSURE: 64 MMHG | HEART RATE: 80 BPM | OXYGEN SATURATION: 98 %

## 2024-01-17 DIAGNOSIS — Z76.89 PREVENTION OF CHEMOTHERAPY-INDUCED NEUTROPENIA: ICD-10-CM

## 2024-01-17 DIAGNOSIS — C50.112 MALIGNANT NEOPLASM OF CENTRAL PORTION OF LEFT BREAST IN FEMALE, ESTROGEN RECEPTOR POSITIVE (HCC): Primary | ICD-10-CM

## 2024-01-17 DIAGNOSIS — Z17.0 MALIGNANT NEOPLASM OF CENTRAL PORTION OF LEFT BREAST IN FEMALE, ESTROGEN RECEPTOR POSITIVE (HCC): Primary | ICD-10-CM

## 2024-01-17 DIAGNOSIS — Z51.11 ENCOUNTER FOR ANTINEOPLASTIC CHEMOTHERAPY: Primary | ICD-10-CM

## 2024-01-17 DIAGNOSIS — C50.912 INVASIVE DUCTAL CARCINOMA OF BREAST, FEMALE, LEFT (HCC): ICD-10-CM

## 2024-01-17 LAB
ALBUMIN SERPL-MCNC: 3.8 G/DL (ref 3.5–5)
ALBUMIN/GLOB SERPL: 1.3 (ref 1.1–2.2)
ALP SERPL-CCNC: 49 U/L (ref 45–117)
ALT SERPL-CCNC: 30 U/L (ref 12–78)
ANION GAP SERPL CALC-SCNC: 5 MMOL/L (ref 5–15)
AST SERPL-CCNC: 22 U/L (ref 15–37)
BASOPHILS # BLD: 0.1 K/UL (ref 0–0.1)
BASOPHILS NFR BLD: 2 % (ref 0–1)
BILIRUB SERPL-MCNC: 0.3 MG/DL (ref 0.2–1)
BUN SERPL-MCNC: 13 MG/DL (ref 6–20)
BUN/CREAT SERPL: 23 (ref 12–20)
CALCIUM SERPL-MCNC: 9.3 MG/DL (ref 8.5–10.1)
CHLORIDE SERPL-SCNC: 108 MMOL/L (ref 97–108)
CO2 SERPL-SCNC: 29 MMOL/L (ref 21–32)
CREAT SERPL-MCNC: 0.56 MG/DL (ref 0.55–1.02)
DIFFERENTIAL METHOD BLD: ABNORMAL
EOSINOPHIL # BLD: 0.1 K/UL (ref 0–0.4)
EOSINOPHIL NFR BLD: 3 % (ref 0–7)
ERYTHROCYTE [DISTWIDTH] IN BLOOD BY AUTOMATED COUNT: 12.8 % (ref 11.5–14.5)
GLOBULIN SER CALC-MCNC: 3 G/DL (ref 2–4)
GLUCOSE SERPL-MCNC: 109 MG/DL (ref 65–100)
HCT VFR BLD AUTO: 33.8 % (ref 35–47)
HGB BLD-MCNC: 11.3 G/DL (ref 11.5–16)
IMM GRANULOCYTES # BLD AUTO: 0.1 K/UL (ref 0–0.04)
IMM GRANULOCYTES NFR BLD AUTO: 2 % (ref 0–0.5)
LYMPHOCYTES # BLD: 0.8 K/UL (ref 0.8–3.5)
LYMPHOCYTES NFR BLD: 29 % (ref 12–49)
MCH RBC QN AUTO: 31.4 PG (ref 26–34)
MCHC RBC AUTO-ENTMCNC: 33.4 G/DL (ref 30–36.5)
MCV RBC AUTO: 93.9 FL (ref 80–99)
MONOCYTES # BLD: 0.5 K/UL (ref 0–1)
MONOCYTES NFR BLD: 17 % (ref 5–13)
NEUTS SEG # BLD: 1.2 K/UL (ref 1.8–8)
NEUTS SEG NFR BLD: 47 % (ref 32–75)
NRBC # BLD: 0 K/UL (ref 0–0.01)
NRBC BLD-RTO: 0 PER 100 WBC
PLATELET # BLD AUTO: 172 K/UL (ref 150–400)
PMV BLD AUTO: 9.1 FL (ref 8.9–12.9)
POTASSIUM SERPL-SCNC: 3.6 MMOL/L (ref 3.5–5.1)
PROT SERPL-MCNC: 6.8 G/DL (ref 6.4–8.2)
RBC # BLD AUTO: 3.6 M/UL (ref 3.8–5.2)
RBC MORPH BLD: ABNORMAL
SODIUM SERPL-SCNC: 142 MMOL/L (ref 136–145)
WBC # BLD AUTO: 2.8 K/UL (ref 3.6–11)

## 2024-01-17 PROCEDURE — 96417 CHEMO IV INFUS EACH ADDL SEQ: CPT

## 2024-01-17 PROCEDURE — 6370000000 HC RX 637 (ALT 250 FOR IP): Performed by: INTERNAL MEDICINE

## 2024-01-17 PROCEDURE — 96413 CHEMO IV INFUSION 1 HR: CPT

## 2024-01-17 PROCEDURE — 85025 COMPLETE CBC W/AUTO DIFF WBC: CPT

## 2024-01-17 PROCEDURE — 2580000003 HC RX 258: Performed by: INTERNAL MEDICINE

## 2024-01-17 PROCEDURE — 96375 TX/PRO/DX INJ NEW DRUG ADDON: CPT

## 2024-01-17 PROCEDURE — 2500000003 HC RX 250 WO HCPCS: Performed by: INTERNAL MEDICINE

## 2024-01-17 PROCEDURE — 6360000002 HC RX W HCPCS: Performed by: INTERNAL MEDICINE

## 2024-01-17 PROCEDURE — 80053 COMPREHEN METABOLIC PANEL: CPT

## 2024-01-17 RX ORDER — DEXAMETHASONE SODIUM PHOSPHATE 10 MG/ML
10 INJECTION INTRAMUSCULAR; INTRAVENOUS ONCE
Status: COMPLETED | OUTPATIENT
Start: 2024-01-17 | End: 2024-01-17

## 2024-01-17 RX ORDER — SODIUM CHLORIDE 0.9 % (FLUSH) 0.9 %
5-40 SYRINGE (ML) INJECTION PRN
Status: DISCONTINUED | OUTPATIENT
Start: 2024-01-17 | End: 2024-01-18 | Stop reason: HOSPADM

## 2024-01-17 RX ORDER — DIPHENHYDRAMINE HCL 25 MG
50 CAPSULE ORAL ONCE
Status: COMPLETED | OUTPATIENT
Start: 2024-01-17 | End: 2024-01-17

## 2024-01-17 RX ORDER — FAMOTIDINE 10 MG/ML
20 INJECTION, SOLUTION INTRAVENOUS ONCE
Status: COMPLETED | OUTPATIENT
Start: 2024-01-17 | End: 2024-01-17

## 2024-01-17 RX ORDER — SODIUM CHLORIDE 9 MG/ML
5-250 INJECTION, SOLUTION INTRAVENOUS PRN
Status: DISCONTINUED | OUTPATIENT
Start: 2024-01-17 | End: 2024-01-18 | Stop reason: HOSPADM

## 2024-01-17 RX ADMIN — DEXAMETHASONE SODIUM PHOSPHATE 10 MG: 10 INJECTION INTRAMUSCULAR; INTRAVENOUS at 13:22

## 2024-01-17 RX ADMIN — SODIUM CHLORIDE, PRESERVATIVE FREE 10 ML: 5 INJECTION INTRAVENOUS at 15:27

## 2024-01-17 RX ADMIN — SODIUM CHLORIDE 25 ML/HR: 9 INJECTION, SOLUTION INTRAVENOUS at 11:57

## 2024-01-17 RX ADMIN — Medication 420 MG: at 12:39

## 2024-01-17 RX ADMIN — SODIUM CHLORIDE, PRESERVATIVE FREE 10 ML: 5 INJECTION INTRAVENOUS at 15:25

## 2024-01-17 RX ADMIN — PACLITAXEL 90 MG: 6 INJECTION, SOLUTION INTRAVENOUS at 14:10

## 2024-01-17 RX ADMIN — FAMOTIDINE 20 MG: 10 INJECTION, SOLUTION INTRAVENOUS at 13:17

## 2024-01-17 RX ADMIN — DIPHENHYDRAMINE HYDROCHLORIDE 50 MG: 25 CAPSULE ORAL at 13:16

## 2024-01-17 RX ADMIN — Medication 260 MG: at 12:01

## 2024-01-17 ASSESSMENT — PAIN SCALES - GENERAL: PAINLEVEL_OUTOF10: 0

## 2024-01-17 NOTE — PROGRESS NOTES
Madelin Parekh is a 53 y.o. female here today to follow up for breast cancer    1. Have you been to the ER, urgent care clinic since your last visit?  Hospitalized since your last visit? no    2. Have you seen or consulted any other health care providers outside of the [facility] since your last visit?  Include any pap smears or colon screening. no

## 2024-01-17 NOTE — PROGRESS NOTES
Patient in today for labs and follow-up visit with Dr. Matt. Today is C4D1 on study FB-12.  Patient reports the following adverse events: gr 1 loss of concentration, gr 1 insomnia, gr 1 pain, gr 1 mouth sores, gr 1 sob, gr 1 tachycardia, gr 1 neuropathy, gr 1 proctitis. Vital signs are stable.  Patient to receive THP. Next appt is 1/24/24.

## 2024-01-17 NOTE — PROGRESS NOTES
Cancer [place] at [facility]  [address], [address] [facility] [address]  W: [phone]  F: [phone]      Reason for Visit:   Madelin Parekh is a 53 y.o. female who is seen in follow up for breast cancer.    Breast Surgeon: Dr. Forrest    Treatment History:   7/18/23 left breast stellate mass, core bx:  IDC, gr 2, 4 mm, ER + at 98%, [place] + at 54%, HER 2 negative at [facility] 1+ (SOHA ration 1; sig/cell 1.85), ki67 59%, DCIS, no LVI  Mammaprint shows luminal B, high risk, index -0.160  8/1/23 left axilla LN core bx:  + for breast cancer  Genetic testing , negative BRCA 1/2 by [employer] 2021  NSABP FB-12  AC 9/20/23 - 11/1/23  Paclitaxel/trastuzumab/pertuzumab 11/15/23-  Held taxol due to ANC 0.9 on c1d1  Held taxol due to ANC 0.7 on C2d1  c3d8 skipped for neutropenia  C3d15 skipped for neutropenia   taxol at c4d1 to 65 mg/m2    History of Present Illness:   An abnormal mammogram led to the pathology above.      Interval history:  Patient presents today for follow up and treatment. Reports gr 1 loss of constipation, gr 1 insomnia, gr 1 pain, gr 1 mouth sores, gr 1 sob, gr 1 tachycardia, gr 1 neuropathy, gr 1 proctitis    FH:  mother with breast cancer, dx at age 83; maternal aunt with breast cancer, dx 50-60s and lung cancer; maternal aunt:  ovarian cancer, dx 40s; father with colon cancer dx 60s    Lmp 12/2022    Past Medical History:   Diagnosis Date    Anxiety     Invasive ductal carcinoma of breast, female, left (HCC) 07/24/2023 7/18/23: LEFT breast, Stellate Mass, Stereotatic biopsy: invasive and in situ carcinoma, grade 2. Largest tumor focus measured 4mm. No lymphovascular invasion seen. Prognostic markers pending.    Murmur     PONV (postoperative nausea and vomiting)       Past Surgical History:   Procedure Laterality Date    APPENDECTOMY  2000    COLONOSCOPY  2007    ENDOSCOPY, COLON, DIAGNOSTIC      GI  2007    COLONOSCOPY    NASAL SEPTUM SURGERY  2012    PORT SURGERY

## 2024-01-17 NOTE — PROGRESS NOTES
flush 0.9 % injection 5-40 mL Admin Date  01/17/2024 Action  Given Dose  10 mL Rate   Route  IntraVENous Administered By  Yen Cummings RN              Two nurses verified prior to administering: Drug name, Drug dose, Infusion volume or drug volume when prepared in a syringe, Rate of administration, Route of administration, Expiration dates and/or times, Appearance and physical integrity of the drugs, Rate set on infusion pump, when used, and Sequencing of drug administration.    1530 Patient tolerated treatment well.  Port maintained positive blood return throughout treatment. Port flushed and de accessed per protocol. Patient was discharged in stable condition. Patient is aware of next scheduled [facility] appointment on 1/24/24.    Future Appointments   Date Time Provider Department Center   1/24/2024  9:00 AM SS INF3 CH3 <4H [facility]   1/31/2024  9:00 AM SS INF3 CH3 <4H [facility]   1/31/2024  9:30 AM Chintan Matt MD [facility] AMB   2/7/2024  9:00 AM SS INF1 CH3 <4H [facility]   2/14/2024  8:30 AM SS INF3 CH3 <4H [facility]   4/4/2024  8:00 AM Ton Forrest Jr, MD [facility] BS AMB         Yen Cummings RN  January 17, 2024

## 2024-01-18 ENCOUNTER — TELEPHONE (OUTPATIENT)
Age: 54
End: 2024-01-18

## 2024-01-18 NOTE — TELEPHONE ENCOUNTER
Patient called and stated that she has some questions about her echocardiogram that she got done. Pt also requested that she speak with Dr. Matt directly if it was possible. Requested a call back.     CB# [phone]

## 2024-01-18 NOTE — TELEPHONE ENCOUNTER
Called and answered questions about lumpectomy v mastectomy. She will come to the office for an US and discuss further.

## 2024-01-24 ENCOUNTER — HOSPITAL ENCOUNTER (OUTPATIENT)
Facility: HOSPITAL | Age: 54
Setting detail: INFUSION SERIES
Discharge: HOME | End: 2024-01-24
Payer: COMMERCIAL

## 2024-01-24 ENCOUNTER — RESEARCH ENCOUNTER (OUTPATIENT)
Age: 54
End: 2024-01-24

## 2024-01-24 VITALS
OXYGEN SATURATION: 100 % | TEMPERATURE: 97.5 F | RESPIRATION RATE: 18 BRPM | HEART RATE: 68 BPM | BODY MASS INDEX: 16.49 KG/M2 | SYSTOLIC BLOOD PRESSURE: 98 MMHG | DIASTOLIC BLOOD PRESSURE: 68 MMHG | WEIGHT: 96.6 LBS | HEIGHT: 64 IN

## 2024-01-24 DIAGNOSIS — Z17.0 MALIGNANT NEOPLASM OF LEFT BREAST IN FEMALE, ESTROGEN RECEPTOR POSITIVE, UNSPECIFIED SITE OF BREAST (HCC): Primary | ICD-10-CM

## 2024-01-24 DIAGNOSIS — K52.9 COLITIS: Primary | ICD-10-CM

## 2024-01-24 DIAGNOSIS — Z51.11 ENCOUNTER FOR ANTINEOPLASTIC CHEMOTHERAPY: ICD-10-CM

## 2024-01-24 DIAGNOSIS — C50.912 MALIGNANT NEOPLASM OF LEFT BREAST IN FEMALE, ESTROGEN RECEPTOR POSITIVE, UNSPECIFIED SITE OF BREAST (HCC): Primary | ICD-10-CM

## 2024-01-24 DIAGNOSIS — Z76.89 PREVENTION OF CHEMOTHERAPY-INDUCED NEUTROPENIA: ICD-10-CM

## 2024-01-24 DIAGNOSIS — C50.112 MALIGNANT NEOPLASM OF CENTRAL PORTION OF LEFT BREAST IN FEMALE, ESTROGEN RECEPTOR POSITIVE (HCC): ICD-10-CM

## 2024-01-24 DIAGNOSIS — C50.912 INVASIVE DUCTAL CARCINOMA OF BREAST, FEMALE, LEFT (HCC): ICD-10-CM

## 2024-01-24 DIAGNOSIS — Z17.0 MALIGNANT NEOPLASM OF CENTRAL PORTION OF LEFT BREAST IN FEMALE, ESTROGEN RECEPTOR POSITIVE (HCC): ICD-10-CM

## 2024-01-24 LAB
BASOPHILS # BLD: 0 K/UL (ref 0–0.1)
BASOPHILS NFR BLD: 1 % (ref 0–1)
DIFFERENTIAL METHOD BLD: ABNORMAL
EOSINOPHIL # BLD: 0.1 K/UL (ref 0–0.4)
EOSINOPHIL NFR BLD: 2 % (ref 0–7)
ERYTHROCYTE [DISTWIDTH] IN BLOOD BY AUTOMATED COUNT: 12.7 % (ref 11.5–14.5)
HCT VFR BLD AUTO: 32.4 % (ref 35–47)
HGB BLD-MCNC: 10.9 G/DL (ref 11.5–16)
IMM GRANULOCYTES # BLD AUTO: 0 K/UL (ref 0–0.04)
IMM GRANULOCYTES NFR BLD AUTO: 0 % (ref 0–0.5)
LYMPHOCYTES # BLD: 0.6 K/UL (ref 0.8–3.5)
LYMPHOCYTES NFR BLD: 19 % (ref 12–49)
MCH RBC QN AUTO: 31.3 PG (ref 26–34)
MCHC RBC AUTO-ENTMCNC: 33.6 G/DL (ref 30–36.5)
MCV RBC AUTO: 93.1 FL (ref 80–99)
MONOCYTES # BLD: 0.2 K/UL (ref 0–1)
MONOCYTES NFR BLD: 8 % (ref 5–13)
NEUTS SEG # BLD: 2.1 K/UL (ref 1.8–8)
NEUTS SEG NFR BLD: 70 % (ref 32–75)
NRBC # BLD: 0 K/UL (ref 0–0.01)
NRBC BLD-RTO: 0 PER 100 WBC
PLATELET # BLD AUTO: 159 K/UL (ref 150–400)
PMV BLD AUTO: 9.4 FL (ref 8.9–12.9)
RBC # BLD AUTO: 3.48 M/UL (ref 3.8–5.2)
RBC MORPH BLD: ABNORMAL
WBC # BLD AUTO: 3 K/UL (ref 3.6–11)

## 2024-01-24 PROCEDURE — 96375 TX/PRO/DX INJ NEW DRUG ADDON: CPT

## 2024-01-24 PROCEDURE — 96413 CHEMO IV INFUSION 1 HR: CPT

## 2024-01-24 PROCEDURE — 2580000003 HC RX 258: Performed by: INTERNAL MEDICINE

## 2024-01-24 PROCEDURE — 6370000000 HC RX 637 (ALT 250 FOR IP): Performed by: INTERNAL MEDICINE

## 2024-01-24 PROCEDURE — 85025 COMPLETE CBC W/AUTO DIFF WBC: CPT

## 2024-01-24 PROCEDURE — 6360000002 HC RX W HCPCS: Performed by: INTERNAL MEDICINE

## 2024-01-24 PROCEDURE — 2500000003 HC RX 250 WO HCPCS: Performed by: INTERNAL MEDICINE

## 2024-01-24 RX ORDER — DEXAMETHASONE SODIUM PHOSPHATE 10 MG/ML
10 INJECTION INTRAMUSCULAR; INTRAVENOUS ONCE
Status: COMPLETED | OUTPATIENT
Start: 2024-01-24 | End: 2024-01-24

## 2024-01-24 RX ORDER — SODIUM CHLORIDE 0.9 % (FLUSH) 0.9 %
5-40 SYRINGE (ML) INJECTION PRN
Status: DISCONTINUED | OUTPATIENT
Start: 2024-01-24 | End: 2024-01-25 | Stop reason: HOSPADM

## 2024-01-24 RX ORDER — SODIUM CHLORIDE 9 MG/ML
5-250 INJECTION, SOLUTION INTRAVENOUS PRN
Status: DISCONTINUED | OUTPATIENT
Start: 2024-01-24 | End: 2024-01-25 | Stop reason: HOSPADM

## 2024-01-24 RX ORDER — CIPROFLOXACIN 500 MG/1
500 TABLET, FILM COATED ORAL 2 TIMES DAILY
Qty: 14 TABLET | Refills: 0 | Status: SHIPPED | OUTPATIENT
Start: 2024-01-24 | End: 2024-01-31

## 2024-01-24 RX ORDER — DIPHENHYDRAMINE HCL 25 MG
50 CAPSULE ORAL ONCE
Status: COMPLETED | OUTPATIENT
Start: 2024-01-24 | End: 2024-01-24

## 2024-01-24 RX ORDER — FAMOTIDINE 10 MG/ML
20 INJECTION, SOLUTION INTRAVENOUS ONCE
Status: COMPLETED | OUTPATIENT
Start: 2024-01-24 | End: 2024-01-24

## 2024-01-24 RX ADMIN — DEXAMETHASONE SODIUM PHOSPHATE 10 MG: 10 INJECTION INTRAMUSCULAR; INTRAVENOUS at 10:37

## 2024-01-24 RX ADMIN — DIPHENHYDRAMINE HYDROCHLORIDE 50 MG: 25 CAPSULE ORAL at 10:30

## 2024-01-24 RX ADMIN — SODIUM CHLORIDE 25 ML/HR: 9 INJECTION, SOLUTION INTRAVENOUS at 10:30

## 2024-01-24 RX ADMIN — FAMOTIDINE 20 MG: 10 INJECTION INTRAVENOUS at 10:34

## 2024-01-24 RX ADMIN — PACLITAXEL 90 MG: 6 INJECTION, SOLUTION INTRAVENOUS at 11:20

## 2024-01-24 ASSESSMENT — PAIN SCALES - GENERAL: PAINLEVEL_OUTOF10: 0

## 2024-01-24 NOTE — PROGRESS NOTES
Spiritual Care Partner Volunteer visited patient at [facility] in [facility] on 1/24/2024     Documented by:  Brown Mejia MDiv  Staff   Paging Service [phone] (KOURTNEY)

## 2024-01-24 NOTE — PROGRESS NOTES
[facility] Progress Note    Date: 2024    Name: Madelin Parekh    MRN: [id number]         : 1970    Ms. Parekh Arrived ambulatory and in no distress for C4D8 of Taxol. Assessment was completed, no acute issues at this time, no new complaints voiced. RCW chest wall port accessed without difficulty, labs drawn & sent for processing.    Ms. Parekh's vitals were reviewed.  Vitals:    24 1239   BP: 98/68   Pulse: 68   Resp:    Temp:    SpO2:        Lab results were obtained and reviewed.  Recent Results (from the past 12 hour(s))   CBC With Auto Differential    Collection Time: 24  9:16 AM   Result Value Ref Range    WBC 3.0 (L) 3.6 - 11.0 K/uL    RBC 3.48 (L) 3.80 - 5.20 M/uL    Hemoglobin 10.9 (L) 11.5 - 16.0 g/dL    Hematocrit 32.4 (L) 35.0 - 47.0 %    MCV 93.1 80.0 - 99.0 FL    MCH 31.3 26.0 - 34.0 PG    MCHC 33.6 30.0 - 36.5 g/dL    RDW 12.7 11.5 - 14.5 %    Platelets 159 150 - 400 K/uL    MPV 9.4 8.9 - 12.9 FL    Nucleated RBCs 0.0 0  WBC    nRBC 0.00 0.00 - 0.01 K/uL    Neutrophils % 70 32 - 75 %    Lymphocytes % 19 12 - 49 %    Monocytes % 8 5 - 13 %    Eosinophils % 2 0 - 7 %    Basophils % 1 0 - 1 %    Immature Granulocytes 0 0.0 - 0.5 %    Neutrophils Absolute 2.1 1.8 - 8.0 K/UL    Lymphocytes Absolute 0.6 (L) 0.8 - 3.5 K/UL    Monocytes Absolute 0.2 0.0 - 1.0 K/UL    Eosinophils Absolute 0.1 0.0 - 0.4 K/UL    Basophils Absolute 0.0 0.0 - 0.1 K/UL    Absolute Immature Granulocyte 0.0 0.00 - 0.04 K/UL    Differential Type SMEAR SCANNED      RBC Comment OVALOCYTES  PRESENT         Medications:  Medications Administered         0.9 % sodium chloride infusion Admin Date  2024 Action  New Bag Dose  25 mL/hr Rate  25 mL/hr Route  IntraVENous Administered By  Yoselyn Luevano, TARAH        dexAMETHasone (DECADRON) injection 10 mg Admin Date  2024 Action  Given Dose  10 mg Rate   Route  IntraVENous Administered By  Yoselyn Luevano, RN        diphenhydrAMINE

## 2024-01-31 ENCOUNTER — OFFICE VISIT (OUTPATIENT)
Age: 54
End: 2024-01-31
Payer: COMMERCIAL

## 2024-01-31 ENCOUNTER — RESEARCH ENCOUNTER (OUTPATIENT)
Age: 54
End: 2024-01-31

## 2024-01-31 ENCOUNTER — HOSPITAL ENCOUNTER (OUTPATIENT)
Facility: HOSPITAL | Age: 54
Setting detail: INFUSION SERIES
Discharge: HOME | End: 2024-01-31
Payer: COMMERCIAL

## 2024-01-31 VITALS
WEIGHT: 96.5 LBS | SYSTOLIC BLOOD PRESSURE: 100 MMHG | DIASTOLIC BLOOD PRESSURE: 71 MMHG | HEIGHT: 64 IN | BODY MASS INDEX: 16.47 KG/M2 | OXYGEN SATURATION: 96 % | HEART RATE: 76 BPM | RESPIRATION RATE: 16 BRPM | TEMPERATURE: 97.9 F

## 2024-01-31 VITALS
WEIGHT: 96 LBS | RESPIRATION RATE: 16 BRPM | OXYGEN SATURATION: 97 % | HEART RATE: 78 BPM | DIASTOLIC BLOOD PRESSURE: 76 MMHG | SYSTOLIC BLOOD PRESSURE: 111 MMHG | TEMPERATURE: 97.9 F | BODY MASS INDEX: 16.47 KG/M2

## 2024-01-31 DIAGNOSIS — C50.912 MALIGNANT NEOPLASM OF LEFT BREAST IN FEMALE, ESTROGEN RECEPTOR POSITIVE, UNSPECIFIED SITE OF BREAST (HCC): Primary | ICD-10-CM

## 2024-01-31 DIAGNOSIS — Z17.0 MALIGNANT NEOPLASM OF CENTRAL PORTION OF LEFT BREAST IN FEMALE, ESTROGEN RECEPTOR POSITIVE (HCC): Primary | ICD-10-CM

## 2024-01-31 DIAGNOSIS — Z76.89 PREVENTION OF CHEMOTHERAPY-INDUCED NEUTROPENIA: ICD-10-CM

## 2024-01-31 DIAGNOSIS — C50.912 INVASIVE DUCTAL CARCINOMA OF BREAST, FEMALE, LEFT (HCC): ICD-10-CM

## 2024-01-31 DIAGNOSIS — Z51.11 ENCOUNTER FOR ANTINEOPLASTIC CHEMOTHERAPY: ICD-10-CM

## 2024-01-31 DIAGNOSIS — Z17.0 MALIGNANT NEOPLASM OF LEFT BREAST IN FEMALE, ESTROGEN RECEPTOR POSITIVE, UNSPECIFIED SITE OF BREAST (HCC): Primary | ICD-10-CM

## 2024-01-31 DIAGNOSIS — C50.112 MALIGNANT NEOPLASM OF CENTRAL PORTION OF LEFT BREAST IN FEMALE, ESTROGEN RECEPTOR POSITIVE (HCC): Primary | ICD-10-CM

## 2024-01-31 LAB
BASOPHILS # BLD: 0.1 K/UL (ref 0–0.1)
BASOPHILS NFR BLD: 2 % (ref 0–1)
DIFFERENTIAL METHOD BLD: ABNORMAL
EOSINOPHIL # BLD: 0 K/UL (ref 0–0.4)
EOSINOPHIL NFR BLD: 0 % (ref 0–7)
ERYTHROCYTE [DISTWIDTH] IN BLOOD BY AUTOMATED COUNT: 12.8 % (ref 11.5–14.5)
HCT VFR BLD AUTO: 32.3 % (ref 35–47)
HGB BLD-MCNC: 10.8 G/DL (ref 11.5–16)
IMM GRANULOCYTES # BLD AUTO: 0 K/UL (ref 0–0.04)
IMM GRANULOCYTES NFR BLD AUTO: 0 % (ref 0–0.5)
LYMPHOCYTES # BLD: 0.5 K/UL (ref 0.8–3.5)
LYMPHOCYTES NFR BLD: 20 % (ref 12–49)
MCH RBC QN AUTO: 31.3 PG (ref 26–34)
MCHC RBC AUTO-ENTMCNC: 33.4 G/DL (ref 30–36.5)
MCV RBC AUTO: 93.6 FL (ref 80–99)
MONOCYTES # BLD: 0.3 K/UL (ref 0–1)
MONOCYTES NFR BLD: 12 % (ref 5–13)
NEUTS SEG # BLD: 1.8 K/UL (ref 1.8–8)
NEUTS SEG NFR BLD: 66 % (ref 32–75)
NRBC # BLD: 0 K/UL (ref 0–0.01)
NRBC BLD-RTO: 0 PER 100 WBC
PLATELET # BLD AUTO: 198 K/UL (ref 150–400)
PMV BLD AUTO: 9.4 FL (ref 8.9–12.9)
RBC # BLD AUTO: 3.45 M/UL (ref 3.8–5.2)
RBC MORPH BLD: ABNORMAL
WBC # BLD AUTO: 2.7 K/UL (ref 3.6–11)
WBC MORPH BLD: ABNORMAL

## 2024-01-31 PROCEDURE — 2500000003 HC RX 250 WO HCPCS: Performed by: INTERNAL MEDICINE

## 2024-01-31 PROCEDURE — 2580000003 HC RX 258: Performed by: INTERNAL MEDICINE

## 2024-01-31 PROCEDURE — 99215 OFFICE O/P EST HI 40 MIN: CPT | Performed by: INTERNAL MEDICINE

## 2024-01-31 PROCEDURE — 6360000002 HC RX W HCPCS: Performed by: INTERNAL MEDICINE

## 2024-01-31 PROCEDURE — 85025 COMPLETE CBC W/AUTO DIFF WBC: CPT

## 2024-01-31 PROCEDURE — 96375 TX/PRO/DX INJ NEW DRUG ADDON: CPT

## 2024-01-31 PROCEDURE — 96413 CHEMO IV INFUSION 1 HR: CPT

## 2024-01-31 PROCEDURE — 6370000000 HC RX 637 (ALT 250 FOR IP): Performed by: INTERNAL MEDICINE

## 2024-01-31 RX ORDER — SODIUM CHLORIDE 9 MG/ML
5-250 INJECTION, SOLUTION INTRAVENOUS PRN
Status: CANCELLED | OUTPATIENT
Start: 2024-02-14

## 2024-01-31 RX ORDER — SODIUM CHLORIDE 9 MG/ML
5-250 INJECTION, SOLUTION INTRAVENOUS PRN
Status: DISCONTINUED | OUTPATIENT
Start: 2024-01-31 | End: 2024-02-01 | Stop reason: HOSPADM

## 2024-01-31 RX ORDER — DIPHENHYDRAMINE HYDROCHLORIDE 50 MG/ML
50 INJECTION INTRAMUSCULAR; INTRAVENOUS
Status: CANCELLED | OUTPATIENT
Start: 2024-02-14

## 2024-01-31 RX ORDER — ONDANSETRON 2 MG/ML
8 INJECTION INTRAMUSCULAR; INTRAVENOUS
OUTPATIENT
Start: 2024-02-21

## 2024-01-31 RX ORDER — FAMOTIDINE 10 MG/ML
20 INJECTION, SOLUTION INTRAVENOUS ONCE
Status: CANCELLED | OUTPATIENT
Start: 2024-02-14 | End: 2024-02-14

## 2024-01-31 RX ORDER — DEXAMETHASONE SODIUM PHOSPHATE 10 MG/ML
10 INJECTION INTRAMUSCULAR; INTRAVENOUS ONCE
Status: CANCELLED | OUTPATIENT
Start: 2024-02-14 | End: 2024-02-14

## 2024-01-31 RX ORDER — ALBUTEROL SULFATE 90 UG/1
4 AEROSOL, METERED RESPIRATORY (INHALATION) PRN
Status: CANCELLED | OUTPATIENT
Start: 2024-02-14

## 2024-01-31 RX ORDER — FAMOTIDINE 10 MG/ML
20 INJECTION, SOLUTION INTRAVENOUS ONCE
OUTPATIENT
Start: 2024-02-28 | End: 2024-02-28

## 2024-01-31 RX ORDER — HEPARIN 100 UNIT/ML
500 SYRINGE INTRAVENOUS PRN
Status: CANCELLED | OUTPATIENT
Start: 2024-02-07

## 2024-01-31 RX ORDER — DIPHENHYDRAMINE HYDROCHLORIDE 50 MG/ML
50 INJECTION INTRAMUSCULAR; INTRAVENOUS
OUTPATIENT
Start: 2024-02-28

## 2024-01-31 RX ORDER — DIPHENHYDRAMINE HCL 25 MG
50 CAPSULE ORAL ONCE
OUTPATIENT
Start: 2024-02-28 | End: 2024-02-28

## 2024-01-31 RX ORDER — SODIUM CHLORIDE 9 MG/ML
5-250 INJECTION, SOLUTION INTRAVENOUS PRN
OUTPATIENT
Start: 2024-02-21

## 2024-01-31 RX ORDER — SODIUM CHLORIDE 0.9 % (FLUSH) 0.9 %
5-40 SYRINGE (ML) INJECTION PRN
Status: DISCONTINUED | OUTPATIENT
Start: 2024-01-31 | End: 2024-02-01 | Stop reason: HOSPADM

## 2024-01-31 RX ORDER — EPINEPHRINE 1 MG/ML
0.3 INJECTION, SOLUTION INTRAMUSCULAR; SUBCUTANEOUS PRN
Status: CANCELLED | OUTPATIENT
Start: 2024-02-07

## 2024-01-31 RX ORDER — SODIUM CHLORIDE 9 MG/ML
INJECTION, SOLUTION INTRAVENOUS CONTINUOUS
Status: CANCELLED | OUTPATIENT
Start: 2024-02-07

## 2024-01-31 RX ORDER — SODIUM CHLORIDE 0.9 % (FLUSH) 0.9 %
5-40 SYRINGE (ML) INJECTION PRN
OUTPATIENT
Start: 2024-02-21

## 2024-01-31 RX ORDER — SODIUM CHLORIDE 9 MG/ML
5-250 INJECTION, SOLUTION INTRAVENOUS PRN
OUTPATIENT
Start: 2024-02-28

## 2024-01-31 RX ORDER — SODIUM CHLORIDE 0.9 % (FLUSH) 0.9 %
5-40 SYRINGE (ML) INJECTION PRN
OUTPATIENT
Start: 2024-02-28

## 2024-01-31 RX ORDER — DEXAMETHASONE SODIUM PHOSPHATE 10 MG/ML
10 INJECTION INTRAMUSCULAR; INTRAVENOUS ONCE
Status: COMPLETED | OUTPATIENT
Start: 2024-01-31 | End: 2024-01-31

## 2024-01-31 RX ORDER — ACETAMINOPHEN 325 MG/1
650 TABLET ORAL
OUTPATIENT
Start: 2024-02-21

## 2024-01-31 RX ORDER — HEPARIN 100 UNIT/ML
500 SYRINGE INTRAVENOUS PRN
OUTPATIENT
Start: 2024-02-21

## 2024-01-31 RX ORDER — MEPERIDINE HYDROCHLORIDE 25 MG/ML
12.5 INJECTION INTRAMUSCULAR; INTRAVENOUS; SUBCUTANEOUS PRN
Status: CANCELLED | OUTPATIENT
Start: 2024-02-07

## 2024-01-31 RX ORDER — ACETAMINOPHEN 325 MG/1
650 TABLET ORAL
OUTPATIENT
Start: 2024-02-28

## 2024-01-31 RX ORDER — ALBUTEROL SULFATE 90 UG/1
4 AEROSOL, METERED RESPIRATORY (INHALATION) PRN
OUTPATIENT
Start: 2024-02-28

## 2024-01-31 RX ORDER — FAMOTIDINE 10 MG/ML
20 INJECTION, SOLUTION INTRAVENOUS
Status: CANCELLED | OUTPATIENT
Start: 2024-02-07

## 2024-01-31 RX ORDER — ALBUTEROL SULFATE 90 UG/1
4 AEROSOL, METERED RESPIRATORY (INHALATION) PRN
Status: CANCELLED | OUTPATIENT
Start: 2024-02-07

## 2024-01-31 RX ORDER — SODIUM CHLORIDE 9 MG/ML
INJECTION, SOLUTION INTRAVENOUS CONTINUOUS
Status: CANCELLED | OUTPATIENT
Start: 2024-02-14

## 2024-01-31 RX ORDER — EPINEPHRINE 1 MG/ML
0.3 INJECTION, SOLUTION INTRAMUSCULAR; SUBCUTANEOUS PRN
OUTPATIENT
Start: 2024-02-21

## 2024-01-31 RX ORDER — SODIUM CHLORIDE 0.9 % (FLUSH) 0.9 %
5-40 SYRINGE (ML) INJECTION PRN
Status: CANCELLED | OUTPATIENT
Start: 2024-02-14

## 2024-01-31 RX ORDER — EPINEPHRINE 1 MG/ML
0.3 INJECTION, SOLUTION INTRAMUSCULAR; SUBCUTANEOUS PRN
OUTPATIENT
Start: 2024-02-28

## 2024-01-31 RX ORDER — PROCHLORPERAZINE EDISYLATE 5 MG/ML
10 INJECTION INTRAMUSCULAR; INTRAVENOUS
OUTPATIENT
Start: 2024-02-28

## 2024-01-31 RX ORDER — ONDANSETRON 2 MG/ML
8 INJECTION INTRAMUSCULAR; INTRAVENOUS
Status: CANCELLED | OUTPATIENT
Start: 2024-02-07

## 2024-01-31 RX ORDER — HEPARIN 100 UNIT/ML
500 SYRINGE INTRAVENOUS PRN
Status: CANCELLED | OUTPATIENT
Start: 2024-02-14

## 2024-01-31 RX ORDER — ACETAMINOPHEN 325 MG/1
650 TABLET ORAL
Status: CANCELLED | OUTPATIENT
Start: 2024-02-14

## 2024-01-31 RX ORDER — HEPARIN 100 UNIT/ML
500 SYRINGE INTRAVENOUS PRN
OUTPATIENT
Start: 2024-02-28

## 2024-01-31 RX ORDER — SODIUM CHLORIDE 9 MG/ML
INJECTION, SOLUTION INTRAVENOUS CONTINUOUS
OUTPATIENT
Start: 2024-02-21

## 2024-01-31 RX ORDER — ALBUTEROL SULFATE 90 UG/1
4 AEROSOL, METERED RESPIRATORY (INHALATION) PRN
OUTPATIENT
Start: 2024-02-21

## 2024-01-31 RX ORDER — DIPHENHYDRAMINE HYDROCHLORIDE 50 MG/ML
50 INJECTION INTRAMUSCULAR; INTRAVENOUS
Status: CANCELLED | OUTPATIENT
Start: 2024-02-07

## 2024-01-31 RX ORDER — DIPHENHYDRAMINE HCL 25 MG
50 CAPSULE ORAL ONCE
Status: COMPLETED | OUTPATIENT
Start: 2024-01-31 | End: 2024-01-31

## 2024-01-31 RX ORDER — FAMOTIDINE 10 MG/ML
20 INJECTION, SOLUTION INTRAVENOUS
Status: CANCELLED | OUTPATIENT
Start: 2024-02-14

## 2024-01-31 RX ORDER — MEPERIDINE HYDROCHLORIDE 25 MG/ML
12.5 INJECTION INTRAMUSCULAR; INTRAVENOUS; SUBCUTANEOUS PRN
OUTPATIENT
Start: 2024-02-28

## 2024-01-31 RX ORDER — ONDANSETRON 2 MG/ML
8 INJECTION INTRAMUSCULAR; INTRAVENOUS
OUTPATIENT
Start: 2024-02-28

## 2024-01-31 RX ORDER — EPINEPHRINE 1 MG/ML
0.3 INJECTION, SOLUTION INTRAMUSCULAR; SUBCUTANEOUS PRN
Status: CANCELLED | OUTPATIENT
Start: 2024-02-14

## 2024-01-31 RX ORDER — DEXAMETHASONE SODIUM PHOSPHATE 10 MG/ML
10 INJECTION INTRAMUSCULAR; INTRAVENOUS ONCE
OUTPATIENT
Start: 2024-02-21 | End: 2024-02-21

## 2024-01-31 RX ORDER — ONDANSETRON 2 MG/ML
8 INJECTION INTRAMUSCULAR; INTRAVENOUS
Status: CANCELLED | OUTPATIENT
Start: 2024-02-14

## 2024-01-31 RX ORDER — SODIUM CHLORIDE 9 MG/ML
INJECTION, SOLUTION INTRAVENOUS CONTINUOUS
OUTPATIENT
Start: 2024-02-28

## 2024-01-31 RX ORDER — PROCHLORPERAZINE EDISYLATE 5 MG/ML
10 INJECTION INTRAMUSCULAR; INTRAVENOUS
OUTPATIENT
Start: 2024-02-21

## 2024-01-31 RX ORDER — DEXAMETHASONE SODIUM PHOSPHATE 10 MG/ML
10 INJECTION INTRAMUSCULAR; INTRAVENOUS ONCE
OUTPATIENT
Start: 2024-02-28 | End: 2024-02-28

## 2024-01-31 RX ORDER — SODIUM CHLORIDE 9 MG/ML
5-250 INJECTION, SOLUTION INTRAVENOUS PRN
Status: CANCELLED | OUTPATIENT
Start: 2024-02-07

## 2024-01-31 RX ORDER — DIPHENHYDRAMINE HYDROCHLORIDE 50 MG/ML
50 INJECTION INTRAMUSCULAR; INTRAVENOUS
OUTPATIENT
Start: 2024-02-21

## 2024-01-31 RX ORDER — MEPERIDINE HYDROCHLORIDE 25 MG/ML
12.5 INJECTION INTRAMUSCULAR; INTRAVENOUS; SUBCUTANEOUS PRN
OUTPATIENT
Start: 2024-02-21

## 2024-01-31 RX ORDER — PROCHLORPERAZINE EDISYLATE 5 MG/ML
10 INJECTION INTRAMUSCULAR; INTRAVENOUS
Status: CANCELLED | OUTPATIENT
Start: 2024-02-07

## 2024-01-31 RX ORDER — FAMOTIDINE 10 MG/ML
20 INJECTION, SOLUTION INTRAVENOUS
OUTPATIENT
Start: 2024-02-21

## 2024-01-31 RX ORDER — DIPHENHYDRAMINE HCL 25 MG
50 CAPSULE ORAL ONCE
Status: CANCELLED | OUTPATIENT
Start: 2024-02-14 | End: 2024-02-14

## 2024-01-31 RX ORDER — DIPHENHYDRAMINE HCL 25 MG
50 CAPSULE ORAL ONCE
OUTPATIENT
Start: 2024-02-21 | End: 2024-02-21

## 2024-01-31 RX ORDER — ACETAMINOPHEN 325 MG/1
650 TABLET ORAL
Status: CANCELLED | OUTPATIENT
Start: 2024-02-07

## 2024-01-31 RX ORDER — FAMOTIDINE 10 MG/ML
20 INJECTION, SOLUTION INTRAVENOUS ONCE
Status: COMPLETED | OUTPATIENT
Start: 2024-01-31 | End: 2024-01-31

## 2024-01-31 RX ORDER — FAMOTIDINE 10 MG/ML
20 INJECTION, SOLUTION INTRAVENOUS
OUTPATIENT
Start: 2024-02-28

## 2024-01-31 RX ORDER — PROCHLORPERAZINE EDISYLATE 5 MG/ML
10 INJECTION INTRAMUSCULAR; INTRAVENOUS
Status: CANCELLED | OUTPATIENT
Start: 2024-02-14

## 2024-01-31 RX ORDER — MEPERIDINE HYDROCHLORIDE 25 MG/ML
12.5 INJECTION INTRAMUSCULAR; INTRAVENOUS; SUBCUTANEOUS PRN
Status: CANCELLED | OUTPATIENT
Start: 2024-02-14

## 2024-01-31 RX ORDER — FAMOTIDINE 10 MG/ML
20 INJECTION, SOLUTION INTRAVENOUS ONCE
OUTPATIENT
Start: 2024-02-21 | End: 2024-02-21

## 2024-01-31 RX ADMIN — PACLITAXEL 90 MG: 6 INJECTION, SOLUTION INTRAVENOUS at 12:10

## 2024-01-31 RX ADMIN — FAMOTIDINE 20 MG: 10 INJECTION INTRAVENOUS at 11:16

## 2024-01-31 RX ADMIN — DIPHENHYDRAMINE HYDROCHLORIDE 50 MG: 25 CAPSULE ORAL at 11:14

## 2024-01-31 RX ADMIN — SODIUM CHLORIDE 25 ML/HR: 9 INJECTION, SOLUTION INTRAVENOUS at 11:14

## 2024-01-31 RX ADMIN — SODIUM CHLORIDE, PRESERVATIVE FREE 20 ML: 5 INJECTION INTRAVENOUS at 13:24

## 2024-01-31 RX ADMIN — DEXAMETHASONE SODIUM PHOSPHATE 10 MG: 10 INJECTION INTRAMUSCULAR; INTRAVENOUS at 11:16

## 2024-01-31 ASSESSMENT — PAIN SCALES - GENERAL: PAINLEVEL_OUTOF10: 0

## 2024-01-31 NOTE — PROGRESS NOTES
Madelin Parekh is a 53 y.o. female  here today to follow up for breast cancer    1. Have you been to the ER, urgent care clinic since your last visit?  Hospitalized since your last visit?no    2. Have you seen or consulted any other health care providers outside of the [facility] since your last visit?  Include any pap smears or colon screening. no

## 2024-01-31 NOTE — PROGRESS NOTES
[facility] [facility]  [address], [address] [facility] [address]  W: [phone]  F: [phone]      Reason for Visit:   Madelin Parkeh is a 53 y.o. female who is seen in follow up for breast cancer.    Breast Surgeon: Dr. Forrest    Treatment History:   7/18/23 left breast stellate mass, core bx:  IDC, gr 2, 4 mm, ER + at 98%, OR + at 54%, HER 2 negative at IHC 1+ (SOHA ration 1; sig/cell 1.85), ki67 59%, DCIS, no LVI  Mammaprint shows luminal B, high risk, index -0.160  8/1/23 left axilla LN core bx:  + for breast cancer  Genetic testing , negative BRCA 1/2 by [employer] 2021  NSABP FB-12  AC 9/20/23 - 11/1/23  Paclitaxel/trastuzumab/pertuzumab 11/15/23-  Held taxol due to ANC 0.9 on c1d1  Held taxol due to ANC 0.7 on C2d1  c3d8 skipped for neutropenia  C3d15 skipped for neutropenia   taxol at c4d1 to 65 mg/m2    History of Present Illness:   An abnormal mammogram led to the pathology above.      Interval history:  Patient presents today for follow up and treatment. Reports gr 1 fatigue, gr 1 chills, gr 1 insomnia, gr 1 mouth sores, gr 1 sob, gr 1 proctitis    FH:  mother with breast cancer, dx at age 83; maternal aunt with breast cancer, dx 50-60s and lung cancer; maternal aunt:  ovarian cancer, dx 40s; father with colon cancer dx 60s    Lmp 12/2022    Past Medical History:   Diagnosis Date    Anxiety     Invasive ductal carcinoma of breast, female, left (HCC) 07/24/2023 7/18/23: LEFT breast, Stellate Mass, Stereotatic biopsy: invasive and in situ carcinoma, grade 2. Largest tumor focus measured 4mm. No lymphovascular invasion seen. Prognostic markers pending.    Murmur     PONV (postoperative nausea and vomiting)       Past Surgical History:   Procedure Laterality Date    APPENDECTOMY  2000    COLONOSCOPY  2007    ENDOSCOPY, COLON, DIAGNOSTIC      GI  2007    COLONOSCOPY    NASAL SEPTUM SURGERY  2012    PORT SURGERY Right 8/29/2023    [facility] CATH PLACEMENT,

## 2024-01-31 NOTE — PROGRESS NOTES
Patient in today for labs and follow-up visit with Dr. Matt. Today is C4D15 on study FB-12.  Patient reports the following adverse events: gr 1 fatigue, gr 1 chills, gr 1 insomnia, gr 1 mouth sores, gr 1 sob, gr 1 proctitis. Vital signs are stable.  Patient to receive paclitaxel. Next appt is 2/7/2024.

## 2024-01-31 NOTE — PROGRESS NOTES
[facility] Progress Note    Date: 2024    Name: Madelin Parekh    MRN: [id number]         : 1970    Ms. Parekh Arrived ambulatory and in no distress for C4D15 of Regimen.  Assessment was completed, no acute issues at this time, no new complaints voiced.  Right chest wall port accessed without difficulty, labs drawn & sent for processing.       Patient proceed to appointment with Dr. Matt.    Ms. Parekh's vitals were reviewed.  Vitals:    24 0855   BP: 111/76   Pulse: 78   Resp: 18   Temp: 97.9 °F (36.6 °C)   SpO2: 96%       Lab results were obtained and reviewed.  Recent Results (from the past 12 hour(s))   CBC With Auto Differential    Collection Time: 24  9:01 AM   Result Value Ref Range    WBC 2.7 (L) 3.6 - 11.0 K/uL    RBC 3.45 (L) 3.80 - 5.20 M/uL    Hemoglobin 10.8 (L) 11.5 - 16.0 g/dL    Hematocrit 32.3 (L) 35.0 - 47.0 %    MCV 93.6 80.0 - 99.0 FL    MCH 31.3 26.0 - 34.0 PG    MCHC 33.4 30.0 - 36.5 g/dL    RDW 12.8 11.5 - 14.5 %    Platelets 198 150 - 400 K/uL    MPV 9.4 8.9 - 12.9 FL    Nucleated RBCs 0.0 0  WBC    nRBC 0.00 0.00 - 0.01 K/uL    Neutrophils % 66 32 - 75 %    Lymphocytes % 20 12 - 49 %    Monocytes % 12 5 - 13 %    Eosinophils % 0 0 - 7 %    Basophils % 2 (H) 0 - 1 %    Immature Granulocytes 0 0.0 - 0.5 %    Neutrophils Absolute 1.8 1.8 - 8.0 K/UL    Lymphocytes Absolute 0.5 (L) 0.8 - 3.5 K/UL    Monocytes Absolute 0.3 0.0 - 1.0 K/UL    Eosinophils Absolute 0.0 0.0 - 0.4 K/UL    Basophils Absolute 0.1 0.0 - 0.1 K/UL    Absolute Immature Granulocyte 0.0 0.00 - 0.04 K/UL    Differential Type MANUAL      RBC Comment NORMOCYTIC, NORMOCHROMIC      WBC Comment TOXIC GRANULATION         Medications:    Medications Administered         0.9 % sodium chloride infusion Admin Date  2024 Action  New Bag Dose  25 mL/hr Rate  25 mL/hr Route  IntraVENous Administered By  Henrietta Pina RN        dexAMETHasone (DECADRON) injection 10 mg Admin Date  2024  Pt wants an appt for tomorrow with PCP, has abdominal right pain, refused to schedule with a different provider. Please assist

## 2024-02-05 ENCOUNTER — OFFICE VISIT (OUTPATIENT)
Age: 54
End: 2024-02-05
Payer: COMMERCIAL

## 2024-02-05 VITALS — HEIGHT: 64 IN | WEIGHT: 96 LBS | BODY MASS INDEX: 16.39 KG/M2

## 2024-02-05 DIAGNOSIS — Z17.0 MALIGNANT NEOPLASM OF LEFT BREAST IN FEMALE, ESTROGEN RECEPTOR POSITIVE, UNSPECIFIED SITE OF BREAST (HCC): Primary | ICD-10-CM

## 2024-02-05 DIAGNOSIS — C50.912 MALIGNANT NEOPLASM OF LEFT BREAST IN FEMALE, ESTROGEN RECEPTOR POSITIVE, UNSPECIFIED SITE OF BREAST (HCC): Primary | ICD-10-CM

## 2024-02-05 PROCEDURE — 99215 OFFICE O/P EST HI 40 MIN: CPT | Performed by: SURGERY

## 2024-02-05 PROCEDURE — 76642 ULTRASOUND BREAST LIMITED: CPT | Performed by: SURGERY

## 2024-02-05 NOTE — PROGRESS NOTES
HISTORY OF PRESENT ILLNESS  Madelin Parekh is a 53 y.o. female     HPI ESTABLISHED patient here today for follow up LEFT breast cancer. She is receiving neoadjuvant chemotherapy that should be completed by 2/2024. The patient is unable to palpate her breast and axilla masses. She is accompanied by her . The patient would like to discuss lumpectomy vs. mastectomy once again.       Review of Systems      Physical Exam       ASSESSMENT and PLAN  {Assessment and Plan Chronic Disease:[id number]}    
adenopathy.   Skin:     General: Skin is warm.   Neurological:      Mental Status: She is alert and oriented to person, place, and time.   Psychiatric:         Behavior: Behavior normal.         Thought Content: Thought content normal.         Judgment: Judgment normal.       Imaging & Procedures  BREAST ULTRASOUND  Indication: LEFT breast cancer  Technique: The area was scanned using a high-frequency linear-array near-field transducer  Findings: Normal ultrasound.  Impression: No acute findings.  Disposition: Follow assessment and plan below.       ASSESSMENT and PLAN   Diagnosis Orders   1. Malignant neoplasm of left breast in female, estrogen receptor positive, unspecified site of breast (HCC)          Patient presents to follow up on LEFT breast cancer, and is doing well overall. Her physical exam was normal. Her ultrasound was normal.             F/U ***. This plan was reviewed with the patient and patient agrees. All questions were answered.    Total time spent excluding procedural time was 20 minutes.    I, Dr. Forrest, personally performed the services described in this document as scribed by Lary Chambers in my presence, and it is both accurate and complete.   
both accurate and complete.

## 2024-02-07 ENCOUNTER — HOSPITAL ENCOUNTER (OUTPATIENT)
Facility: HOSPITAL | Age: 54
Setting detail: INFUSION SERIES
Discharge: HOME | End: 2024-02-07
Payer: COMMERCIAL

## 2024-02-07 ENCOUNTER — RESEARCH ENCOUNTER (OUTPATIENT)
Facility: HOSPITAL | Age: 54
End: 2024-02-07

## 2024-02-07 VITALS
DIASTOLIC BLOOD PRESSURE: 59 MMHG | OXYGEN SATURATION: 99 % | WEIGHT: 95.9 LBS | RESPIRATION RATE: 18 BRPM | TEMPERATURE: 97.4 F | SYSTOLIC BLOOD PRESSURE: 93 MMHG | HEART RATE: 73 BPM | BODY MASS INDEX: 16.37 KG/M2 | HEIGHT: 64 IN

## 2024-02-07 DIAGNOSIS — Z51.11 ENCOUNTER FOR ANTINEOPLASTIC CHEMOTHERAPY: Primary | ICD-10-CM

## 2024-02-07 DIAGNOSIS — Z76.89 PREVENTION OF CHEMOTHERAPY-INDUCED NEUTROPENIA: ICD-10-CM

## 2024-02-07 DIAGNOSIS — C50.912 INVASIVE DUCTAL CARCINOMA OF BREAST, FEMALE, LEFT (HCC): ICD-10-CM

## 2024-02-07 LAB
BASOPHILS # BLD: 0 K/UL (ref 0–0.1)
BASOPHILS NFR BLD: 1 % (ref 0–1)
DIFFERENTIAL METHOD BLD: ABNORMAL
EOSINOPHIL # BLD: 0.1 K/UL (ref 0–0.4)
EOSINOPHIL NFR BLD: 2 % (ref 0–7)
ERYTHROCYTE [DISTWIDTH] IN BLOOD BY AUTOMATED COUNT: 13.1 % (ref 11.5–14.5)
HCT VFR BLD AUTO: 32.5 % (ref 35–47)
HGB BLD-MCNC: 10.7 G/DL (ref 11.5–16)
IMM GRANULOCYTES # BLD AUTO: 0 K/UL (ref 0–0.04)
IMM GRANULOCYTES NFR BLD AUTO: 1 % (ref 0–0.5)
LYMPHOCYTES # BLD: 0.8 K/UL (ref 0.8–3.5)
LYMPHOCYTES NFR BLD: 24 % (ref 12–49)
MCH RBC QN AUTO: 30.9 PG (ref 26–34)
MCHC RBC AUTO-ENTMCNC: 32.9 G/DL (ref 30–36.5)
MCV RBC AUTO: 93.9 FL (ref 80–99)
MONOCYTES # BLD: 0.3 K/UL (ref 0–1)
MONOCYTES NFR BLD: 10 % (ref 5–13)
NEUTS SEG # BLD: 2 K/UL (ref 1.8–8)
NEUTS SEG NFR BLD: 62 % (ref 32–75)
NRBC # BLD: 0 K/UL (ref 0–0.01)
NRBC BLD-RTO: 0 PER 100 WBC
PLATELET # BLD AUTO: 196 K/UL (ref 150–400)
PMV BLD AUTO: 9.2 FL (ref 8.9–12.9)
RBC # BLD AUTO: 3.46 M/UL (ref 3.8–5.2)
RBC MORPH BLD: ABNORMAL
WBC # BLD AUTO: 3.2 K/UL (ref 3.6–11)

## 2024-02-07 PROCEDURE — 6360000002 HC RX W HCPCS: Performed by: INTERNAL MEDICINE

## 2024-02-07 PROCEDURE — 2580000003 HC RX 258: Performed by: INTERNAL MEDICINE

## 2024-02-07 PROCEDURE — 96375 TX/PRO/DX INJ NEW DRUG ADDON: CPT

## 2024-02-07 PROCEDURE — 96413 CHEMO IV INFUSION 1 HR: CPT

## 2024-02-07 PROCEDURE — 2500000003 HC RX 250 WO HCPCS: Performed by: INTERNAL MEDICINE

## 2024-02-07 PROCEDURE — 6370000000 HC RX 637 (ALT 250 FOR IP): Performed by: INTERNAL MEDICINE

## 2024-02-07 PROCEDURE — 85025 COMPLETE CBC W/AUTO DIFF WBC: CPT

## 2024-02-07 RX ORDER — FAMOTIDINE 10 MG/ML
20 INJECTION, SOLUTION INTRAVENOUS ONCE
Status: COMPLETED | OUTPATIENT
Start: 2024-02-07 | End: 2024-02-07

## 2024-02-07 RX ORDER — DIPHENHYDRAMINE HCL 25 MG
50 CAPSULE ORAL ONCE
Status: COMPLETED | OUTPATIENT
Start: 2024-02-07 | End: 2024-02-07

## 2024-02-07 RX ORDER — DEXAMETHASONE SODIUM PHOSPHATE 10 MG/ML
10 INJECTION INTRAMUSCULAR; INTRAVENOUS ONCE
Status: COMPLETED | OUTPATIENT
Start: 2024-02-07 | End: 2024-02-07

## 2024-02-07 RX ORDER — SODIUM CHLORIDE 0.9 % (FLUSH) 0.9 %
5-40 SYRINGE (ML) INJECTION PRN
Status: DISCONTINUED | OUTPATIENT
Start: 2024-02-07 | End: 2024-02-08 | Stop reason: HOSPADM

## 2024-02-07 RX ORDER — SODIUM CHLORIDE 9 MG/ML
5-250 INJECTION, SOLUTION INTRAVENOUS PRN
Status: DISCONTINUED | OUTPATIENT
Start: 2024-02-07 | End: 2024-02-08 | Stop reason: HOSPADM

## 2024-02-07 RX ADMIN — DEXAMETHASONE SODIUM PHOSPHATE 10 MG: 10 INJECTION INTRAMUSCULAR; INTRAVENOUS at 10:24

## 2024-02-07 RX ADMIN — DIPHENHYDRAMINE HYDROCHLORIDE 50 MG: 25 CAPSULE ORAL at 10:17

## 2024-02-07 RX ADMIN — SODIUM CHLORIDE 25 ML/HR: 9 INJECTION, SOLUTION INTRAVENOUS at 10:16

## 2024-02-07 RX ADMIN — FAMOTIDINE 20 MG: 10 INJECTION, SOLUTION INTRAVENOUS at 10:20

## 2024-02-07 RX ADMIN — PACLITAXEL 90 MG: 6 INJECTION, SOLUTION INTRAVENOUS at 11:19

## 2024-02-07 ASSESSMENT — PAIN SCALES - GENERAL: PAINLEVEL_OUTOF10: 0

## 2024-02-07 NOTE — PROGRESS NOTES
[facility] Progress Note    Date: 2024    Name: Madelin Parekh    MRN: [id number]         : 1970    Ms. Parekh Arrived ambulatory and in no distress for C5D1 of Taxol.  Assessment was completed, no acute issues at this time, no new complaints voiced.  RCW chest wall port accessed without difficulty, labs drawn & sent for processing.    Ms. Parekh's vitals were reviewed.  Vitals:    24 0854   BP: (!) 104/59   Pulse: 90   Resp: 18   Temp: 97.4 °F (36.3 °C)   SpO2: 99%       Lab results were obtained and reviewed.  Recent Results (from the past 12 hour(s))   CBC With Auto Differential    Collection Time: 24  8:59 AM   Result Value Ref Range    WBC 3.2 (L) 3.6 - 11.0 K/uL    RBC 3.46 (L) 3.80 - 5.20 M/uL    Hemoglobin 10.7 (L) 11.5 - 16.0 g/dL    Hematocrit 32.5 (L) 35.0 - 47.0 %    MCV 93.9 80.0 - 99.0 FL    MCH 30.9 26.0 - 34.0 PG    MCHC 32.9 30.0 - 36.5 g/dL    RDW 13.1 11.5 - 14.5 %    Platelets 196 150 - 400 K/uL    MPV 9.2 8.9 - 12.9 FL    Nucleated RBCs 0.0 0  WBC    nRBC 0.00 0.00 - 0.01 K/uL    Neutrophils % 62 32 - 75 %    Lymphocytes % 24 12 - 49 %    Monocytes % 10 5 - 13 %    Eosinophils % 2 0 - 7 %    Basophils % 1 0 - 1 %    Immature Granulocytes 1 (H) 0.0 - 0.5 %    Neutrophils Absolute 2.0 1.8 - 8.0 K/UL    Lymphocytes Absolute 0.8 0.8 - 3.5 K/UL    Monocytes Absolute 0.3 0.0 - 1.0 K/UL    Eosinophils Absolute 0.1 0.0 - 0.4 K/UL    Basophils Absolute 0.0 0.0 - 0.1 K/UL    Absolute Immature Granulocyte 0.0 0.00 - 0.04 K/UL    Differential Type SMEAR SCANNED      RBC Comment OVALOCYTES  1+         Medications:  Medications Administered         0.9 % sodium chloride infusion Admin Date  2024 Action  New Bag Dose  25 mL/hr Rate  25 mL/hr Route  IntraVENous Administered By  Yoselyn Luevano, TARAH        dexAMETHasone (DECADRON) injection 10 mg Admin Date  2024 Action  Given Dose  10 mg Rate   Route  IntraVENous Administered By  Yoselyn Luevano, RN         diphenhydrAMINE (BENADRYL) capsule 50 mg Admin Date  02/07/2024 Action  Given Dose  50 mg Rate   Route  Oral Administered By  Yoselyn Luevano RN        famotidine (PEPCID) injection 20 mg Admin Date  02/07/2024 Action  Given Dose  20 mg Rate   Route  IntraVENous Administered By  Yoselyn Luevano RN        PACLitaxel (TAXOL) 90 mg in sodium chloride 0.9 % 250 mL chemo infusion Admin Date  02/07/2024 Action  New Bag Dose  90 mg Rate  290 mL/hr Route  IntraVENous Administered By  Yoselyn Luevano RN          Ms. Parekh tolerated treatment well and [facility] in [facility] condition at 1230.   Port de-accessed, flushed per protocol. She is to return on 2/14/24 for her next appointment.    Yoselyn Luevano RN  February 7, 2024

## 2024-02-12 ENCOUNTER — TELEPHONE (OUTPATIENT)
Age: 54
End: 2024-02-12

## 2024-02-14 ENCOUNTER — RESEARCH ENCOUNTER (OUTPATIENT)
Facility: HOSPITAL | Age: 54
End: 2024-02-14

## 2024-02-14 ENCOUNTER — HOSPITAL ENCOUNTER (OUTPATIENT)
Facility: HOSPITAL | Age: 54
Setting detail: INFUSION SERIES
Discharge: HOME | End: 2024-02-14
Payer: COMMERCIAL

## 2024-02-14 ENCOUNTER — OFFICE VISIT (OUTPATIENT)
Age: 54
End: 2024-02-14

## 2024-02-14 VITALS
OXYGEN SATURATION: 96 % | HEART RATE: 83 BPM | BODY MASS INDEX: 16.48 KG/M2 | RESPIRATION RATE: 16 BRPM | TEMPERATURE: 97.6 F | WEIGHT: 96 LBS | DIASTOLIC BLOOD PRESSURE: 73 MMHG | SYSTOLIC BLOOD PRESSURE: 109 MMHG

## 2024-02-14 VITALS
WEIGHT: 96.9 LBS | BODY MASS INDEX: 16.54 KG/M2 | RESPIRATION RATE: 16 BRPM | SYSTOLIC BLOOD PRESSURE: 108 MMHG | TEMPERATURE: 97.6 F | OXYGEN SATURATION: 96 % | DIASTOLIC BLOOD PRESSURE: 60 MMHG | HEART RATE: 82 BPM | HEIGHT: 64 IN

## 2024-02-14 DIAGNOSIS — C50.912 INVASIVE DUCTAL CARCINOMA OF BREAST, FEMALE, LEFT (HCC): ICD-10-CM

## 2024-02-14 DIAGNOSIS — C50.112 MALIGNANT NEOPLASM OF CENTRAL PORTION OF LEFT BREAST IN FEMALE, ESTROGEN RECEPTOR POSITIVE (HCC): Primary | ICD-10-CM

## 2024-02-14 DIAGNOSIS — Z17.0 MALIGNANT NEOPLASM OF CENTRAL PORTION OF LEFT BREAST IN FEMALE, ESTROGEN RECEPTOR POSITIVE (HCC): Primary | ICD-10-CM

## 2024-02-14 DIAGNOSIS — Z76.89 PREVENTION OF CHEMOTHERAPY-INDUCED NEUTROPENIA: Primary | ICD-10-CM

## 2024-02-14 DIAGNOSIS — Z51.11 ENCOUNTER FOR ANTINEOPLASTIC CHEMOTHERAPY: ICD-10-CM

## 2024-02-14 LAB
BASOPHILS # BLD: 0 K/UL (ref 0–0.1)
BASOPHILS NFR BLD: 0 % (ref 0–1)
DIFFERENTIAL METHOD BLD: ABNORMAL
EOSINOPHIL # BLD: 0 K/UL (ref 0–0.4)
EOSINOPHIL NFR BLD: 0 % (ref 0–7)
ERYTHROCYTE [DISTWIDTH] IN BLOOD BY AUTOMATED COUNT: 13.8 % (ref 11.5–14.5)
HCT VFR BLD AUTO: 31.7 % (ref 35–47)
HGB BLD-MCNC: 10.8 G/DL (ref 11.5–16)
IMM GRANULOCYTES # BLD AUTO: 0 K/UL
IMM GRANULOCYTES NFR BLD AUTO: 0 %
LYMPHOCYTES # BLD: 1.1 K/UL (ref 0.8–3.5)
LYMPHOCYTES NFR BLD: 34 % (ref 12–49)
MCH RBC QN AUTO: 31.6 PG (ref 26–34)
MCHC RBC AUTO-ENTMCNC: 34.1 G/DL (ref 30–36.5)
MCV RBC AUTO: 92.7 FL (ref 80–99)
MONOCYTES # BLD: 0.4 K/UL (ref 0–1)
MONOCYTES NFR BLD: 14 % (ref 5–13)
MYELOCYTES NFR BLD MANUAL: 2 %
NEUTS BAND NFR BLD MANUAL: 1 % (ref 0–6)
NEUTS SEG # BLD: 1.6 K/UL (ref 1.8–8)
NEUTS SEG NFR BLD: 49 % (ref 32–75)
NRBC # BLD: 0 K/UL (ref 0–0.01)
NRBC BLD-RTO: 0 PER 100 WBC
PLATELET # BLD AUTO: 202 K/UL (ref 150–400)
PMV BLD AUTO: 9.5 FL (ref 8.9–12.9)
RBC # BLD AUTO: 3.42 M/UL (ref 3.8–5.2)
RBC MORPH BLD: ABNORMAL
WBC # BLD AUTO: 3.2 K/UL (ref 3.6–11)

## 2024-02-14 PROCEDURE — 96413 CHEMO IV INFUSION 1 HR: CPT

## 2024-02-14 PROCEDURE — 2580000003 HC RX 258: Performed by: INTERNAL MEDICINE

## 2024-02-14 PROCEDURE — 6360000002 HC RX W HCPCS: Performed by: INTERNAL MEDICINE

## 2024-02-14 PROCEDURE — 96375 TX/PRO/DX INJ NEW DRUG ADDON: CPT

## 2024-02-14 PROCEDURE — 6370000000 HC RX 637 (ALT 250 FOR IP): Performed by: INTERNAL MEDICINE

## 2024-02-14 PROCEDURE — 2500000003 HC RX 250 WO HCPCS: Performed by: INTERNAL MEDICINE

## 2024-02-14 PROCEDURE — 85025 COMPLETE CBC W/AUTO DIFF WBC: CPT

## 2024-02-14 RX ORDER — DEXAMETHASONE SODIUM PHOSPHATE 10 MG/ML
10 INJECTION INTRAMUSCULAR; INTRAVENOUS ONCE
Status: COMPLETED | OUTPATIENT
Start: 2024-02-14 | End: 2024-02-14

## 2024-02-14 RX ORDER — SODIUM CHLORIDE 0.9 % (FLUSH) 0.9 %
5-40 SYRINGE (ML) INJECTION PRN
Status: DISCONTINUED | OUTPATIENT
Start: 2024-02-14 | End: 2024-02-15 | Stop reason: HOSPADM

## 2024-02-14 RX ORDER — SODIUM CHLORIDE 9 MG/ML
5-250 INJECTION, SOLUTION INTRAVENOUS PRN
Status: DISCONTINUED | OUTPATIENT
Start: 2024-02-14 | End: 2024-02-15 | Stop reason: HOSPADM

## 2024-02-14 RX ORDER — FAMOTIDINE 10 MG/ML
20 INJECTION, SOLUTION INTRAVENOUS ONCE
Status: COMPLETED | OUTPATIENT
Start: 2024-02-14 | End: 2024-02-14

## 2024-02-14 RX ORDER — DIPHENHYDRAMINE HCL 25 MG
50 CAPSULE ORAL ONCE
Status: COMPLETED | OUTPATIENT
Start: 2024-02-14 | End: 2024-02-14

## 2024-02-14 RX ADMIN — DIPHENHYDRAMINE HYDROCHLORIDE 50 MG: 25 CAPSULE ORAL at 10:29

## 2024-02-14 RX ADMIN — FAMOTIDINE 20 MG: 10 INJECTION INTRAVENOUS at 10:31

## 2024-02-14 RX ADMIN — PACLITAXEL 90 MG: 6 INJECTION, SOLUTION INTRAVENOUS at 11:10

## 2024-02-14 RX ADMIN — SODIUM CHLORIDE 25 ML/HR: 9 INJECTION, SOLUTION INTRAVENOUS at 10:28

## 2024-02-14 RX ADMIN — DEXAMETHASONE SODIUM PHOSPHATE 10 MG: 10 INJECTION INTRAMUSCULAR; INTRAVENOUS at 10:33

## 2024-02-14 ASSESSMENT — PAIN SCALES - GENERAL: PAINLEVEL_OUTOF10: 0

## 2024-02-14 NOTE — PROGRESS NOTES
Madelin Parekh is a 53 y.o. female here today to follow up for breast cancer    1. Have you been to the ER, urgent care clinic since your last visit?  Hospitalized since your last visit?no    2. Have you seen or consulted any other health care providers outside of the [facility] since your last visit?  Include any pap smears or colon screening. no

## 2024-02-14 NOTE — PROGRESS NOTES
GIVEN:  Medications Administered         0.9 % sodium chloride infusion Admin Date  02/14/2024 Action  New Bag Dose  25 mL/hr Rate  25 mL/hr Route  IntraVENous Administered By  Seble Miller RN        dexAMETHasone (DECADRON) injection 10 mg Admin Date  02/14/2024 Action  Given Dose  10 mg Rate   Route  IntraVENous Administered By  Seble Miller RN        diphenhydrAMINE (BENADRYL) capsule 50 mg Admin Date  02/14/2024 Action  Given Dose  50 mg Rate   Route  Oral Administered By  Seble Miller RN        famotidine (PEPCID) injection 20 mg Admin Date  02/14/2024 Action  Given Dose  20 mg Rate   Route  IntraVENous Administered By  Seble Miller RN        PACLitaxel (TAXOL) 90 mg in sodium chloride 0.9 % 250 mL chemo infusion Admin Date  02/14/2024 Action  New Bag Dose  90 mg Rate  290 mL/hr Route  IntraVENous Administered By  Seble Miller RN              Two nurses verified prior to administering:    Drug name  Drug dose  Infusion volume or drug volume when prepared in a syringe  Rate of administration  Route of administration  Expiration dates and/or times  Appearance and physical integrity of the drugs  Rate set on infusion pump, when used  Sequencing of drug administration      Post-Infusion Vitals:  Vitals:    02/14/24 1215   BP: 108/60   Pulse: 82   Resp: 16   Temp: 97.6 °F (36.4 °C)   SpO2: 96%           Pt tolerated treatment well. Port maintained positive blood return throughout treatment, flushed with positive blood return at conclusion and de-accessed per protocol. D/c home ambulatory in no distress. Patient is aware of next appointment on 2/21/24 @ 0900 at the [facility] location.        Future Appointments:  Future Appointments   Date Time Provider Department Center   2/21/2024  9:00 AM [facility]   2/28/2024  9:00 AM SS INF1 CH3 <4H [facility]   2/28/2024  9:30 AM Chintan Matt MD [facility] AMB   3/8/2024  9:00 AM SF JAMES  ASSESSMENT A SFMORPAT RI OR Pre As   4/4/2024  8:00 AM Ton Forrest Jr, MD [facility] BS AMB

## 2024-02-14 NOTE — PROGRESS NOTES
[facility] [place] at [facility]  [address], [address] [facility] [address]  W: [phone]  F: [phone]      Reason for Visit:   Madelin Parekh is a 53 y.o. female who is seen in follow up for breast cancer.    Breast Surgeon: Dr. Forrest    Treatment History:   7/18/23 left breast stellate mass, core bx:  IDC, gr 2, 4 mm, ER + at 98%, TX + at 54%, HER 2 negative at IHC 1+ (SOHA ration 1; sig/cell 1.85), ki67 59%, DCIS, no LVI  Mammaprint shows luminal B, high risk, index -0.160  8/1/23 left axilla LN core bx:  + for breast cancer  Genetic testing , negative BRCA 1/2 by [employer] 2021  NSABP FB-12  AC 9/20/23 - 11/1/23  Paclitaxel/trastuzumab/pertuzumab 11/15/23-  Held taxol due to ANC 0.9 on c1d1  Held taxol due to ANC 0.7 on C2d1  c3d8 skipped for neutropenia  C3d15 skipped for neutropenia   taxol at c4d1 to 65 mg/m2    History of Present Illness:   An abnormal mammogram led to the pathology above.      Interval history:  Patient presents today for follow up and treatment. Reports gr 1 fatigue, gr 1 insomnia, stomach cramps on occasion, gr 1 sob, gr 1 neuropathy    FH:  mother with breast cancer, dx at age 83; maternal aunt with breast cancer, dx 50-60s and lung cancer; maternal aunt:  ovarian cancer, dx 40s; father with colon cancer dx 60s    Lmp 12/2022    Past Medical History:   Diagnosis Date    Anxiety     Invasive ductal carcinoma of breast, female, left (HCC) 07/24/2023 7/18/23: LEFT breast, Stellate Mass, Stereotatic biopsy: invasive and in situ carcinoma, grade 2. Largest tumor focus measured 4mm. No lymphovascular invasion seen. Prognostic markers pending.    Murmur     PONV (postoperative nausea and vomiting)       Past Surgical History:   Procedure Laterality Date    APPENDECTOMY  2000    COLONOSCOPY  2007    ENDOSCOPY, COLON, DIAGNOSTIC      GI  2007    COLONOSCOPY    NASAL SEPTUM SURGERY  2012    PORT SURGERY Right 8/29/2023    [facility] CATH PLACEMENT, ULTRASOUND

## 2024-02-21 ENCOUNTER — HOSPITAL ENCOUNTER (OUTPATIENT)
Facility: HOSPITAL | Age: 54
Setting detail: INFUSION SERIES
Discharge: HOME | End: 2024-02-21
Payer: COMMERCIAL

## 2024-02-21 ENCOUNTER — RESEARCH ENCOUNTER (OUTPATIENT)
Age: 54
End: 2024-02-21

## 2024-02-21 VITALS
OXYGEN SATURATION: 98 % | DIASTOLIC BLOOD PRESSURE: 55 MMHG | HEART RATE: 79 BPM | HEIGHT: 64 IN | RESPIRATION RATE: 16 BRPM | TEMPERATURE: 97.7 F | BODY MASS INDEX: 16.53 KG/M2 | WEIGHT: 96.8 LBS | SYSTOLIC BLOOD PRESSURE: 101 MMHG

## 2024-02-21 DIAGNOSIS — Z76.89 PREVENTION OF CHEMOTHERAPY-INDUCED NEUTROPENIA: Primary | ICD-10-CM

## 2024-02-21 DIAGNOSIS — Z51.11 ENCOUNTER FOR ANTINEOPLASTIC CHEMOTHERAPY: ICD-10-CM

## 2024-02-21 DIAGNOSIS — C50.912 INVASIVE DUCTAL CARCINOMA OF BREAST, FEMALE, LEFT (HCC): ICD-10-CM

## 2024-02-21 LAB
BASOPHILS # BLD: 0 K/UL (ref 0–0.1)
BASOPHILS NFR BLD: 1 % (ref 0–1)
DIFFERENTIAL METHOD BLD: ABNORMAL
EOSINOPHIL # BLD: 0 K/UL (ref 0–0.4)
EOSINOPHIL NFR BLD: 1 % (ref 0–7)
ERYTHROCYTE [DISTWIDTH] IN BLOOD BY AUTOMATED COUNT: 14.2 % (ref 11.5–14.5)
HCT VFR BLD AUTO: 32.4 % (ref 35–47)
HGB BLD-MCNC: 10.9 G/DL (ref 11.5–16)
IMM GRANULOCYTES # BLD AUTO: 0.1 K/UL (ref 0–0.04)
IMM GRANULOCYTES NFR BLD AUTO: 2 % (ref 0–0.5)
LYMPHOCYTES # BLD: 0.8 K/UL (ref 0.8–3.5)
LYMPHOCYTES NFR BLD: 19 % (ref 12–49)
MCH RBC QN AUTO: 31.3 PG (ref 26–34)
MCHC RBC AUTO-ENTMCNC: 33.6 G/DL (ref 30–36.5)
MCV RBC AUTO: 93.1 FL (ref 80–99)
MONOCYTES # BLD: 0.4 K/UL (ref 0–1)
MONOCYTES NFR BLD: 10 % (ref 5–13)
NEUTS SEG # BLD: 3.1 K/UL (ref 1.8–8)
NEUTS SEG NFR BLD: 67 % (ref 32–75)
NRBC # BLD: 0 K/UL (ref 0–0.01)
NRBC BLD-RTO: 0 PER 100 WBC
PLATELET # BLD AUTO: 228 K/UL (ref 150–400)
PMV BLD AUTO: 9.5 FL (ref 8.9–12.9)
RBC # BLD AUTO: 3.48 M/UL (ref 3.8–5.2)
RBC MORPH BLD: ABNORMAL
WBC # BLD AUTO: 4.4 K/UL (ref 3.6–11)

## 2024-02-21 PROCEDURE — 6370000000 HC RX 637 (ALT 250 FOR IP): Performed by: INTERNAL MEDICINE

## 2024-02-21 PROCEDURE — 36415 COLL VENOUS BLD VENIPUNCTURE: CPT

## 2024-02-21 PROCEDURE — 85025 COMPLETE CBC W/AUTO DIFF WBC: CPT

## 2024-02-21 PROCEDURE — 6360000002 HC RX W HCPCS: Performed by: INTERNAL MEDICINE

## 2024-02-21 PROCEDURE — 2500000003 HC RX 250 WO HCPCS: Performed by: INTERNAL MEDICINE

## 2024-02-21 PROCEDURE — 96413 CHEMO IV INFUSION 1 HR: CPT

## 2024-02-21 PROCEDURE — 2580000003 HC RX 258: Performed by: INTERNAL MEDICINE

## 2024-02-21 PROCEDURE — 96375 TX/PRO/DX INJ NEW DRUG ADDON: CPT

## 2024-02-21 RX ORDER — SODIUM CHLORIDE 9 MG/ML
5-250 INJECTION, SOLUTION INTRAVENOUS PRN
Status: DISCONTINUED | OUTPATIENT
Start: 2024-02-21 | End: 2024-02-22 | Stop reason: HOSPADM

## 2024-02-21 RX ORDER — DEXAMETHASONE SODIUM PHOSPHATE 10 MG/ML
10 INJECTION INTRAMUSCULAR; INTRAVENOUS ONCE
Status: COMPLETED | OUTPATIENT
Start: 2024-02-21 | End: 2024-02-21

## 2024-02-21 RX ORDER — DIPHENHYDRAMINE HCL 25 MG
50 CAPSULE ORAL ONCE
Status: COMPLETED | OUTPATIENT
Start: 2024-02-21 | End: 2024-02-21

## 2024-02-21 RX ORDER — SODIUM CHLORIDE 0.9 % (FLUSH) 0.9 %
5-40 SYRINGE (ML) INJECTION PRN
Status: DISCONTINUED | OUTPATIENT
Start: 2024-02-21 | End: 2024-02-22 | Stop reason: HOSPADM

## 2024-02-21 RX ORDER — FAMOTIDINE 10 MG/ML
20 INJECTION, SOLUTION INTRAVENOUS ONCE
Status: COMPLETED | OUTPATIENT
Start: 2024-02-21 | End: 2024-02-21

## 2024-02-21 RX ADMIN — FAMOTIDINE 20 MG: 10 INJECTION INTRAVENOUS at 11:47

## 2024-02-21 RX ADMIN — SODIUM CHLORIDE, PRESERVATIVE FREE 20 ML: 5 INJECTION INTRAVENOUS at 14:20

## 2024-02-21 RX ADMIN — DEXAMETHASONE SODIUM PHOSPHATE 10 MG: 10 INJECTION INTRAMUSCULAR; INTRAVENOUS at 11:50

## 2024-02-21 RX ADMIN — PACLITAXEL 90 MG: 6 INJECTION, SOLUTION INTRAVENOUS at 13:15

## 2024-02-21 RX ADMIN — SODIUM CHLORIDE 50 ML/HR: 9 INJECTION, SOLUTION INTRAVENOUS at 11:46

## 2024-02-21 RX ADMIN — DIPHENHYDRAMINE HYDROCHLORIDE 50 MG: 25 CAPSULE ORAL at 11:46

## 2024-02-21 ASSESSMENT — PAIN SCALES - GENERAL: PAINLEVEL_OUTOF10: 0

## 2024-02-21 NOTE — PROGRESS NOTES
[facility] Progress Note        Date: 2024    Name: Madelin Parekh    MRN: [id number]         : 1970    Ms. Parekh Arrived ambulatory and in no distress for C5D15  of   Taxol Regimen.  Assessment was completed, no acute issues at this time, no new complaints voiced.  Right chest wall port accessed without difficulty, labs drawn & sent for processing.      Ms. Parekh's vitals were reviewed.  Vitals:    24 0900 24 1415   BP: 113/67 (!) 101/55   Pulse: 81 79   Resp: 16    Temp: 97.7 °F (36.5 °C)    TempSrc: Temporal    SpO2: 98%    Weight: 43.9 kg (96 lb 12.8 oz)    Height: 1.626 m (5' 4.02\")         Lab results were obtained and reviewed, within parameters for treatment.  Recent Results (from the past 12 hour(s))   CBC With Auto Differential    Collection Time: 24  9:12 AM   Result Value Ref Range    WBC 4.4 3.6 - 11.0 K/uL    RBC 3.48 (L) 3.80 - 5.20 M/uL    Hemoglobin 10.9 (L) 11.5 - 16.0 g/dL    Hematocrit 32.4 (L) 35.0 - 47.0 %    MCV 93.1 80.0 - 99.0 FL    MCH 31.3 26.0 - 34.0 PG    MCHC 33.6 30.0 - 36.5 g/dL    RDW 14.2 11.5 - 14.5 %    Platelets 228 150 - 400 K/uL    MPV 9.5 8.9 - 12.9 FL    Nucleated RBCs 0.0 0  WBC    nRBC 0.00 0.00 - 0.01 K/uL    Neutrophils % 67 32 - 75 %    Lymphocytes % 19 12 - 49 %    Monocytes % 10 5 - 13 %    Eosinophils % 1 0 - 7 %    Basophils % 1 0 - 1 %    Immature Granulocytes 2 (H) 0.0 - 0.5 %    Neutrophils Absolute 3.1 1.8 - 8.0 K/UL    Lymphocytes Absolute 0.8 0.8 - 3.5 K/UL    Monocytes Absolute 0.4 0.0 - 1.0 K/UL    Eosinophils Absolute 0.0 0.0 - 0.4 K/UL    Basophils Absolute 0.0 0.0 - 0.1 K/UL    Absolute Immature Granulocyte 0.1 (H) 0.00 - 0.04 K/UL    Differential Type SMEAR SCANNED      RBC Comment OVALOCYTES  PRESENT           Medications:    Medications Administered         0.9 % sodium chloride infusion Admin Date  2024 Action  New Bag Dose  50 mL/hr Rate  50 mL/hr

## 2024-02-26 ENCOUNTER — PATIENT MESSAGE (OUTPATIENT)
Age: 54
End: 2024-02-26

## 2024-02-26 DIAGNOSIS — C50.112 MALIGNANT NEOPLASM OF CENTRAL PORTION OF LEFT BREAST IN FEMALE, ESTROGEN RECEPTOR POSITIVE (HCC): ICD-10-CM

## 2024-02-26 DIAGNOSIS — Z17.0 MALIGNANT NEOPLASM OF CENTRAL PORTION OF LEFT BREAST IN FEMALE, ESTROGEN RECEPTOR POSITIVE (HCC): ICD-10-CM

## 2024-02-26 DIAGNOSIS — I82.621 ACUTE DEEP VEIN THROMBOSIS (DVT) OF RIGHT UPPER EXTREMITY, UNSPECIFIED VEIN (HCC): ICD-10-CM

## 2024-02-26 RX ORDER — APIXABAN 5 MG/1
TABLET, FILM COATED ORAL
Qty: 70 TABLET | OUTPATIENT
Start: 2024-02-26

## 2024-02-27 ENCOUNTER — OFFICE VISIT (OUTPATIENT)
Age: 54
End: 2024-02-27
Payer: COMMERCIAL

## 2024-02-27 ENCOUNTER — HOSPITAL ENCOUNTER (OUTPATIENT)
Facility: HOSPITAL | Age: 54
Setting detail: INFUSION SERIES
Discharge: HOME | End: 2024-02-27
Payer: COMMERCIAL

## 2024-02-27 ENCOUNTER — RESEARCH ENCOUNTER (OUTPATIENT)
Age: 54
End: 2024-02-27

## 2024-02-27 ENCOUNTER — TELEPHONE (OUTPATIENT)
Age: 54
End: 2024-02-27

## 2024-02-27 VITALS
TEMPERATURE: 98.3 F | BODY MASS INDEX: 16.71 KG/M2 | HEIGHT: 64 IN | RESPIRATION RATE: 16 BRPM | WEIGHT: 97.9 LBS | DIASTOLIC BLOOD PRESSURE: 55 MMHG | HEART RATE: 78 BPM | SYSTOLIC BLOOD PRESSURE: 99 MMHG | OXYGEN SATURATION: 95 %

## 2024-02-27 VITALS
OXYGEN SATURATION: 96 % | BODY MASS INDEX: 16.64 KG/M2 | DIASTOLIC BLOOD PRESSURE: 60 MMHG | SYSTOLIC BLOOD PRESSURE: 104 MMHG | HEART RATE: 75 BPM | TEMPERATURE: 98 F | RESPIRATION RATE: 16 BRPM | WEIGHT: 97 LBS

## 2024-02-27 DIAGNOSIS — Z17.0 MALIGNANT NEOPLASM OF CENTRAL PORTION OF LEFT BREAST IN FEMALE, ESTROGEN RECEPTOR POSITIVE (HCC): Primary | ICD-10-CM

## 2024-02-27 DIAGNOSIS — C50.112 MALIGNANT NEOPLASM OF CENTRAL PORTION OF LEFT BREAST IN FEMALE, ESTROGEN RECEPTOR POSITIVE (HCC): Primary | ICD-10-CM

## 2024-02-27 DIAGNOSIS — Z51.11 ENCOUNTER FOR ANTINEOPLASTIC CHEMOTHERAPY: ICD-10-CM

## 2024-02-27 DIAGNOSIS — C50.912 INVASIVE DUCTAL CARCINOMA OF BREAST, FEMALE, LEFT (HCC): ICD-10-CM

## 2024-02-27 DIAGNOSIS — Z76.89 PREVENTION OF CHEMOTHERAPY-INDUCED NEUTROPENIA: Primary | ICD-10-CM

## 2024-02-27 LAB
BASOPHILS # BLD: 0 K/UL (ref 0–0.1)
BASOPHILS NFR BLD: 1 % (ref 0–1)
DIFFERENTIAL METHOD BLD: ABNORMAL
EOSINOPHIL # BLD: 0 K/UL (ref 0–0.4)
EOSINOPHIL NFR BLD: 1 % (ref 0–7)
ERYTHROCYTE [DISTWIDTH] IN BLOOD BY AUTOMATED COUNT: 14.6 % (ref 11.5–14.5)
HCT VFR BLD AUTO: 31.7 % (ref 35–47)
HGB BLD-MCNC: 10.5 G/DL (ref 11.5–16)
IMM GRANULOCYTES # BLD AUTO: 0 K/UL (ref 0–0.04)
IMM GRANULOCYTES NFR BLD AUTO: 1 % (ref 0–0.5)
LYMPHOCYTES # BLD: 0.8 K/UL (ref 0.8–3.5)
LYMPHOCYTES NFR BLD: 17 % (ref 12–49)
MCH RBC QN AUTO: 31.2 PG (ref 26–34)
MCHC RBC AUTO-ENTMCNC: 33.1 G/DL (ref 30–36.5)
MCV RBC AUTO: 94.1 FL (ref 80–99)
MONOCYTES # BLD: 0.3 K/UL (ref 0–1)
MONOCYTES NFR BLD: 6 % (ref 5–13)
NEUTS SEG # BLD: 3.5 K/UL (ref 1.8–8)
NEUTS SEG NFR BLD: 74 % (ref 32–75)
NRBC # BLD: 0 K/UL (ref 0–0.01)
NRBC BLD-RTO: 0 PER 100 WBC
PLATELET # BLD AUTO: 203 K/UL (ref 150–400)
PMV BLD AUTO: 9.5 FL (ref 8.9–12.9)
RBC # BLD AUTO: 3.37 M/UL (ref 3.8–5.2)
RBC MORPH BLD: ABNORMAL
WBC # BLD AUTO: 4.6 K/UL (ref 3.6–11)

## 2024-02-27 PROCEDURE — 2580000003 HC RX 258: Performed by: INTERNAL MEDICINE

## 2024-02-27 PROCEDURE — 6360000002 HC RX W HCPCS: Performed by: INTERNAL MEDICINE

## 2024-02-27 PROCEDURE — 2500000003 HC RX 250 WO HCPCS: Performed by: INTERNAL MEDICINE

## 2024-02-27 PROCEDURE — 99215 OFFICE O/P EST HI 40 MIN: CPT | Performed by: INTERNAL MEDICINE

## 2024-02-27 PROCEDURE — 85025 COMPLETE CBC W/AUTO DIFF WBC: CPT

## 2024-02-27 PROCEDURE — 6370000000 HC RX 637 (ALT 250 FOR IP): Performed by: INTERNAL MEDICINE

## 2024-02-27 PROCEDURE — 96375 TX/PRO/DX INJ NEW DRUG ADDON: CPT

## 2024-02-27 PROCEDURE — 96413 CHEMO IV INFUSION 1 HR: CPT

## 2024-02-27 RX ORDER — DIPHENHYDRAMINE HCL 25 MG
50 CAPSULE ORAL ONCE
Status: COMPLETED | OUTPATIENT
Start: 2024-02-27 | End: 2024-02-27

## 2024-02-27 RX ORDER — DEXAMETHASONE SODIUM PHOSPHATE 10 MG/ML
10 INJECTION INTRAMUSCULAR; INTRAVENOUS ONCE
Status: COMPLETED | OUTPATIENT
Start: 2024-02-27 | End: 2024-02-27

## 2024-02-27 RX ORDER — SODIUM CHLORIDE 0.9 % (FLUSH) 0.9 %
5-40 SYRINGE (ML) INJECTION PRN
Status: DISCONTINUED | OUTPATIENT
Start: 2024-02-27 | End: 2024-02-28 | Stop reason: HOSPADM

## 2024-02-27 RX ORDER — SODIUM CHLORIDE 9 MG/ML
5-250 INJECTION, SOLUTION INTRAVENOUS PRN
Status: DISCONTINUED | OUTPATIENT
Start: 2024-02-27 | End: 2024-02-28 | Stop reason: HOSPADM

## 2024-02-27 RX ORDER — FAMOTIDINE 10 MG/ML
20 INJECTION, SOLUTION INTRAVENOUS ONCE
Status: COMPLETED | OUTPATIENT
Start: 2024-02-27 | End: 2024-02-27

## 2024-02-27 RX ADMIN — PACLITAXEL 90 MG: 6 INJECTION, SOLUTION INTRAVENOUS at 11:28

## 2024-02-27 RX ADMIN — DIPHENHYDRAMINE HYDROCHLORIDE 50 MG: 25 CAPSULE ORAL at 10:39

## 2024-02-27 RX ADMIN — FAMOTIDINE 20 MG: 10 INJECTION INTRAVENOUS at 10:39

## 2024-02-27 RX ADMIN — DEXAMETHASONE SODIUM PHOSPHATE 10 MG: 10 INJECTION INTRAMUSCULAR; INTRAVENOUS at 10:41

## 2024-02-27 ASSESSMENT — PAIN SCALES - GENERAL: PAINLEVEL_OUTOF10: 0

## 2024-02-27 NOTE — TELEPHONE ENCOUNTER
Patient called and stated that she would like a callback to talk about what she will need to bring to her mammogram appt as well as some other questions she had regarding it.       CB# [phone]

## 2024-02-27 NOTE — PROGRESS NOTES
Madelin Parekh is a 54 y.o. female here today to follow up for breast cancer    1. Have you been to the ER, urgent care clinic since your last visit?  Hospitalized since your last visit? no    2. Have you seen or consulted any other health care providers outside of the [facility] since your last visit?  Include any pap smears or colon screening. no    
postmenopausal    We explained to the patient that the goal of systemic adjuvant therapy is to improve the chances for cure and decrease the risk of relapse. We explained why a patient can have microscopic cancer spread now even though physical examination, laboratory studies and imaging studies are negative for cancer. We explained that the same treatments used now as adjuvant or preventive treatments rarely if ever are curative in women who develop metastases.     We discussed that there is no difference in overall survival between neoadjuvant and adjuvant chemotherapy.  We discussed the rationale for neoadjuvant chemotherapy, if chemotherapy is warranted, as it is in this case: to avoid any potential delays in giving chemotherapy following surgery, to be able to see the response of the tumor to chemotherapy, and to potentially downstage the tumor prior to surgery.    High risk mammaprint and high clinical risk, a discussion of chemotherapy is warranted    I discussed the potential risks of dose-dense chemotherapy with the patient.  (DD AC-T, adriamycin 60 mg/m2; cyclophosphamide 600 mg/m2 q 2 weeks x 4; paclitaxel 80 mg/m2 qweekly x 12). Major toxicities include nausea and vomiting, stomatitis, fatigue, and a small risk of heart damage. Anemia frequently results and occasionally requires transfusions. Neutropenic fever is uncommon, but can be a life-threatening problem. Also, there is a small but increased risk of myelodysplasia and acute leukemia. We provided the patient with detailed information concerning toxicity including frequent toxicities that only last a few days, such as nausea, vomiting, mouth sores, arthralgia, myalgia, and potentially allergic reactions to paclitaxel, as well as toxicities which can be longer lasting including total alopecia, fatigue, anemia and neuropathy. We provided the patient with detailed information concerning the toxicities of their regimen in addition to our verbal

## 2024-02-27 NOTE — PROGRESS NOTES
[facility] Progress Note    Date: 2024    Name: Madelin Parekh    MRN: [id number]         : 1970    Ms. Parekh Arrived ambulatory and in no distress for C5D22 of Taxol Regimen.  Assessment was completed, no acute issues at this time, no new complaints voiced. Chest wall port accessed without difficulty, labs drawn & sent for processing. Access and assessment performed by Lidia Stanley RN.    Patient proceed to appointment with Dr. Matt.    Ms. Parekh's vitals were reviewed.  Vitals:    24 1230   BP: (!) 99/55   Pulse: 78   Resp:    Temp:    SpO2:        Lab results were obtained and reviewed.  Recent Results (from the past 12 hour(s))   CBC With Auto Differential    Collection Time: 24  9:00 AM   Result Value Ref Range    WBC 4.6 3.6 - 11.0 K/uL    RBC 3.37 (L) 3.80 - 5.20 M/uL    Hemoglobin 10.5 (L) 11.5 - 16.0 g/dL    Hematocrit 31.7 (L) 35.0 - 47.0 %    MCV 94.1 80.0 - 99.0 FL    MCH 31.2 26.0 - 34.0 PG    MCHC 33.1 30.0 - 36.5 g/dL    RDW 14.6 (H) 11.5 - 14.5 %    Platelets 203 150 - 400 K/uL    MPV 9.5 8.9 - 12.9 FL    Nucleated RBCs 0.0 0  WBC    nRBC 0.00 0.00 - 0.01 K/uL    Neutrophils % 74 32 - 75 %    Lymphocytes % 17 12 - 49 %    Monocytes % 6 5 - 13 %    Eosinophils % 1 0 - 7 %    Basophils % 1 0 - 1 %    Immature Granulocytes 1 (H) 0.0 - 0.5 %    Neutrophils Absolute 3.5 1.8 - 8.0 K/UL    Lymphocytes Absolute 0.8 0.8 - 3.5 K/UL    Monocytes Absolute 0.3 0.0 - 1.0 K/UL    Eosinophils Absolute 0.0 0.0 - 0.4 K/UL    Basophils Absolute 0.0 0.0 - 0.1 K/UL    Absolute Immature Granulocyte 0.0 0.00 - 0.04 K/UL    Differential Type SMEAR SCANNED      RBC Comment NORMOCYTIC, NORMOCHROMIC         Medications:  Medications Administered         dexAMETHasone (DECADRON) injection 10 mg Admin Date  2024 Action  Given Dose  10 mg Rate   Route  IntraVENous Administered By  Kathy Dallas RN        diphenhydrAMINE (BENADRYL) capsule 50 mg Admin Date  2024  Action  Given Dose  50 mg Rate   Route  Oral Administered By  Kathy Dallas RN        famotidine (PEPCID) injection 20 mg Admin Date  02/27/2024 Action  Given Dose  20 mg Rate   Route  IntraVENous Administered By  Kathy Dallas RN        PACLitaxel (TAXOL) 90 mg in sodium chloride 0.9 % 250 mL chemo infusion Admin Date  02/27/2024 Action  New Bag Dose  90 mg Rate  290 mL/hr Route  IntraVENous Administered By  Kathy Dallas RN            Two nurses verified prior to administering: Drug name, drug dose, infusion volume or drug volume when prepared in a syringe, rate of administration, route of administration,  expiration dates and/or times, appearance and physical integrity of the drugs, rate set on infusion pump, when used sequencing of drug administration.       Ms. Parekh tolerated treatment well and was [facility] in [facility] condition.   Port de-accessed, flushed per protocol. Patient is aware of future appointments.    Future Appointments   Date Time Provider Department Center   3/8/2024  9:00 AM [facility] PAT ASSESSMENT A [facility]ORPAT RI OR Pre As   3/15/2024  8:30 AM [facility] CT 2 SFMRCT [facility]   3/15/2024  1:00 PM [facility] LYNETTE 6 SMHRMAM [facility]   4/4/2024  8:00 AM Ton Forrest Jr, MD [facility] BS AMB   4/9/2024 10:00 AM Christopher Camp MD [facility]        Kathy Dallas RN  February 27, 2024

## 2024-02-28 ENCOUNTER — HOSPITAL ENCOUNTER (OUTPATIENT)
Facility: HOSPITAL | Age: 54
Setting detail: INFUSION SERIES
End: 2024-02-28
Payer: COMMERCIAL

## 2024-03-01 DIAGNOSIS — C50.112 MALIGNANT NEOPLASM OF CENTRAL PORTION OF LEFT BREAST IN FEMALE, ESTROGEN RECEPTOR POSITIVE (HCC): ICD-10-CM

## 2024-03-01 DIAGNOSIS — Z17.0 MALIGNANT NEOPLASM OF CENTRAL PORTION OF LEFT BREAST IN FEMALE, ESTROGEN RECEPTOR POSITIVE (HCC): ICD-10-CM

## 2024-03-01 DIAGNOSIS — E87.6 HYPOKALEMIA: ICD-10-CM

## 2024-03-06 RX ORDER — POTASSIUM CHLORIDE 1500 MG/1
20 TABLET, EXTENDED RELEASE ORAL DAILY
Qty: 90 TABLET | OUTPATIENT
Start: 2024-03-06

## 2024-03-08 ENCOUNTER — CLINICAL DOCUMENTATION (OUTPATIENT)
Age: 54
End: 2024-03-08

## 2024-03-08 ENCOUNTER — TELEPHONE (OUTPATIENT)
Age: 54
End: 2024-03-08

## 2024-03-08 NOTE — TELEPHONE ENCOUNTER
Patient called asking about her pre admission appointment and her surgery for March 19 , which has been cancelled.   The patient said she is not having a mastectomy but still wants the lumpectomy. She states it must be a misunderstanding.  I assured the patient that Mel would call her back on Monday 03/11/2024 to figure this out.

## 2024-03-08 NOTE — PROGRESS NOTES
The patient called confused about her PAT and surgery date? She states she went for PAT today and she was not on the schedule nor was she on the OR schedule. She mentioned having had changed her surgery from a mastectomy to a lumpectomy? I assured the patient we would follow up with this on Monday when Dr. Forrest and Mel the  were back in the office. The patient was appreciative.

## 2024-03-11 DIAGNOSIS — C50.912 MALIGNANT NEOPLASM OF LEFT BREAST IN FEMALE, ESTROGEN RECEPTOR POSITIVE, UNSPECIFIED SITE OF BREAST (HCC): Primary | ICD-10-CM

## 2024-03-11 DIAGNOSIS — Z17.0 MALIGNANT NEOPLASM OF LEFT BREAST IN FEMALE, ESTROGEN RECEPTOR POSITIVE, UNSPECIFIED SITE OF BREAST (HCC): Primary | ICD-10-CM

## 2024-03-14 ENCOUNTER — HOSPITAL ENCOUNTER (OUTPATIENT)
Facility: HOSPITAL | Age: 54
Discharge: HOME | End: 2024-03-14
Payer: COMMERCIAL

## 2024-03-14 ENCOUNTER — TELEPHONE (OUTPATIENT)
Age: 54
End: 2024-03-14

## 2024-03-14 VITALS
HEIGHT: 64 IN | DIASTOLIC BLOOD PRESSURE: 66 MMHG | SYSTOLIC BLOOD PRESSURE: 116 MMHG | WEIGHT: 96.7 LBS | RESPIRATION RATE: 15 BRPM | TEMPERATURE: 97.3 F | BODY MASS INDEX: 16.51 KG/M2 | HEART RATE: 77 BPM

## 2024-03-14 LAB
ALBUMIN SERPL-MCNC: 4.1 G/DL (ref 3.5–5)
ALBUMIN/GLOB SERPL: 1.5 (ref 1.1–2.2)
ALP SERPL-CCNC: 57 U/L (ref 45–117)
ALT SERPL-CCNC: 27 U/L (ref 12–78)
ANION GAP SERPL CALC-SCNC: 3 MMOL/L (ref 5–15)
AST SERPL-CCNC: 22 U/L (ref 15–37)
BASOPHILS # BLD: 0.1 K/UL (ref 0–0.1)
BASOPHILS NFR BLD: 2 % (ref 0–1)
BILIRUB SERPL-MCNC: 0.7 MG/DL (ref 0.2–1)
BUN SERPL-MCNC: 13 MG/DL (ref 6–20)
BUN/CREAT SERPL: 24 (ref 12–20)
CALCIUM SERPL-MCNC: 9.2 MG/DL (ref 8.5–10.1)
CHLORIDE SERPL-SCNC: 106 MMOL/L (ref 97–108)
CO2 SERPL-SCNC: 31 MMOL/L (ref 21–32)
CREAT SERPL-MCNC: 0.54 MG/DL (ref 0.55–1.02)
DIFFERENTIAL METHOD BLD: ABNORMAL
EKG ATRIAL RATE: 78 BPM
EKG DIAGNOSIS: NORMAL
EKG P AXIS: 86 DEGREES
EKG P-R INTERVAL: 160 MS
EKG Q-T INTERVAL: 406 MS
EKG QRS DURATION: 84 MS
EKG QTC CALCULATION (BAZETT): 462 MS
EKG R AXIS: 101 DEGREES
EKG T AXIS: 73 DEGREES
EKG VENTRICULAR RATE: 78 BPM
EOSINOPHIL # BLD: 0 K/UL (ref 0–0.4)
EOSINOPHIL NFR BLD: 1 % (ref 0–7)
ERYTHROCYTE [DISTWIDTH] IN BLOOD BY AUTOMATED COUNT: 14.7 % (ref 11.5–14.5)
GLOBULIN SER CALC-MCNC: 2.7 G/DL (ref 2–4)
GLUCOSE SERPL-MCNC: 99 MG/DL (ref 65–100)
HCT VFR BLD AUTO: 34.7 % (ref 35–47)
HGB BLD-MCNC: 11.4 G/DL (ref 11.5–16)
IMM GRANULOCYTES # BLD AUTO: 0 K/UL (ref 0–0.04)
IMM GRANULOCYTES NFR BLD AUTO: 0 % (ref 0–0.5)
LYMPHOCYTES # BLD: 0.8 K/UL (ref 0.8–3.5)
LYMPHOCYTES NFR BLD: 28 % (ref 12–49)
MCH RBC QN AUTO: 31 PG (ref 26–34)
MCHC RBC AUTO-ENTMCNC: 32.9 G/DL (ref 30–36.5)
MCV RBC AUTO: 94.3 FL (ref 80–99)
MONOCYTES # BLD: 0.4 K/UL (ref 0–1)
MONOCYTES NFR BLD: 15 % (ref 5–13)
NEUTS SEG # BLD: 1.4 K/UL (ref 1.8–8)
NEUTS SEG NFR BLD: 54 % (ref 32–75)
NRBC # BLD: 0 K/UL (ref 0–0.01)
NRBC BLD-RTO: 0 PER 100 WBC
PLATELET # BLD AUTO: 188 K/UL (ref 150–400)
PMV BLD AUTO: 9 FL (ref 8.9–12.9)
POTASSIUM SERPL-SCNC: 3.8 MMOL/L (ref 3.5–5.1)
PROT SERPL-MCNC: 6.8 G/DL (ref 6.4–8.2)
RBC # BLD AUTO: 3.68 M/UL (ref 3.8–5.2)
RBC MORPH BLD: ABNORMAL
SODIUM SERPL-SCNC: 140 MMOL/L (ref 136–145)
WBC # BLD AUTO: 2.7 K/UL (ref 3.6–11)

## 2024-03-14 PROCEDURE — 80053 COMPREHEN METABOLIC PANEL: CPT

## 2024-03-14 PROCEDURE — 36415 COLL VENOUS BLD VENIPUNCTURE: CPT

## 2024-03-14 PROCEDURE — 93010 ELECTROCARDIOGRAM REPORT: CPT | Performed by: SPECIALIST

## 2024-03-14 PROCEDURE — 85025 COMPLETE CBC W/AUTO DIFF WBC: CPT

## 2024-03-14 PROCEDURE — 93005 ELECTROCARDIOGRAM TRACING: CPT | Performed by: SURGERY

## 2024-03-14 RX ORDER — POTASSIUM CHLORIDE 20 MEQ/1
20 TABLET, EXTENDED RELEASE ORAL EVERY MORNING
COMMUNITY

## 2024-03-14 RX ORDER — ALPRAZOLAM 0.5 MG/1
0.5 TABLET ORAL PRN
COMMUNITY

## 2024-03-14 NOTE — PERIOP NOTE
[facility]                   [address] [place] [address]   MAIN OR                                  [phone]   MAIN PRE OP                          [phone]                                                                                AMBULATORY PRE OP          [phone]  PRE-ADMISSION TESTING    [phone]   Surgery Date:  Tuesday 3/19/24       Is surgery arrival time given by surgeon?  NO  If “NO”, [place] staff will call you between 3 and 7pm the day before your surgery with your arrival time. (If your surgery is on a Monday, we will call you the Friday before.)    Call [phone] after 7pm Monday-Friday if you did not receive this call.    INSTRUCTIONS BEFORE YOUR SURGERY   When You  Arrive Arrive at the 2nd Floor Admitting Desk on the day of your surgery  Have your insurance card, photo ID, and any copayment (if needed)   Food   and   Drink NO food or drink after midnight the night before surgery    This means NO water, gum, mints, coffee, juice, etc.  No alcohol (beer, wine, liquor) 24 hours before and after surgery   Medications to   TAKE   Morning of Surgery MEDICATIONS TO TAKE THE MORNING OF SURGERY WITH A SIP OF WATER:   Xanax if needed  Sertraline (Zoloft)   Medications  To  STOP      7 days before surgery Non-Steroidal anti-inflammatory Drugs (NSAID's): for example, Ibuprofen (Advil, Motrin), Naproxen (Aleve)  Aspirin, if taking for pain   Herbal supplements, vitamins, and fish oil  Other:  (Pain medications not listed above, including Tylenol may be taken)   Blood  Thinners Stop taking the Eliquis 2 days before surgery as instructed by your MD.   Bathing Clothing  Jewelry  Valuables     If you shower the morning of surgery, please do not apply anything to your skin (lotions, powders, deodorant, or makeup, especially mascara)  Follow Chlorhexidine Care Fusion body wash instructions provided to you during PAT appointment. Begin 3 days prior  to surgery.  Do not shave or trim anywhere 24 hours before surgery  Wear your hair loose or down; no pony-tails, buns, or metal hair clips  Wear loose, comfortable, clean clothes  Wear glasses instead of contacts  Leave money, valuables, and jewelry, including body piercings, at home     Going Home - or Spending the Night SAME-DAY SURGERY: You must have a responsible adult drive you home and stay with you 24 hours after surgery  ADMITS: If your doctor is keeping you in the hospital after surgery, leave personal belongings/luggage in your car until you have a hospital room number.    Hospital discharge time is 12 noon  Drivers must be here before 12 noon unless you are told differently   Special Instructions        Follow all instructions so your surgery won’t be cancelled.  Please, be on time.                    If a situation occurs and you are delayed the day of surgery, call [phone].    If your physical condition changes (like a fever, cold, flu, etc.) call your surgeon.    Home medication(s) reviewed and verified via  LIST   VERBAL   during PAT appointment.    The patient was contacted by  IN-PERSON  The patient verbalizes understanding of all instructions and   DOES NOT   need reinforcement.

## 2024-03-14 NOTE — TELEPHONE ENCOUNTER
Patient called and stated that she had some questions about the 2 test that she is getting done tomorrow. Requested a call back to discuss.       # [phone]

## 2024-03-14 NOTE — PERIOP NOTE
Patient started taking Eliquis after getting a blood clot near her port a cath. Prescribing MD was her oncologist, Dr Borja, he has already given her instructions to stop taking her Eliquis 2 days before surgery. Patient was also instructed to stop taking her potassium po 2 weeks after chemo is finished, which is today. CMP ordered during PAT to recheck potassium levels.

## 2024-03-15 ENCOUNTER — HOSPITAL ENCOUNTER (OUTPATIENT)
Facility: HOSPITAL | Age: 54
Discharge: HOME | End: 2024-03-15
Attending: INTERNAL MEDICINE
Payer: COMMERCIAL

## 2024-03-15 VITALS — WEIGHT: 97 LBS | BODY MASS INDEX: 16.56 KG/M2 | HEIGHT: 64 IN

## 2024-03-15 DIAGNOSIS — C50.912 INVASIVE DUCTAL CARCINOMA OF BREAST, FEMALE, LEFT (HCC): ICD-10-CM

## 2024-03-15 DIAGNOSIS — Z17.0 MALIGNANT NEOPLASM OF CENTRAL PORTION OF LEFT BREAST IN FEMALE, ESTROGEN RECEPTOR POSITIVE (HCC): ICD-10-CM

## 2024-03-15 DIAGNOSIS — Z51.11 ENCOUNTER FOR ANTINEOPLASTIC CHEMOTHERAPY: ICD-10-CM

## 2024-03-15 DIAGNOSIS — C50.112 MALIGNANT NEOPLASM OF CENTRAL PORTION OF LEFT BREAST IN FEMALE, ESTROGEN RECEPTOR POSITIVE (HCC): ICD-10-CM

## 2024-03-15 PROCEDURE — 77065 DX MAMMO INCL CAD UNI: CPT

## 2024-03-15 PROCEDURE — 71250 CT THORAX DX C-: CPT

## 2024-03-18 ENCOUNTER — ANESTHESIA EVENT (OUTPATIENT)
Facility: HOSPITAL | Age: 54
End: 2024-03-18
Payer: COMMERCIAL

## 2024-03-19 ENCOUNTER — ANESTHESIA (OUTPATIENT)
Facility: HOSPITAL | Age: 54
End: 2024-03-19
Payer: COMMERCIAL

## 2024-03-19 ENCOUNTER — HOSPITAL ENCOUNTER (OUTPATIENT)
Facility: HOSPITAL | Age: 54
End: 2024-03-19
Attending: SURGERY
Payer: COMMERCIAL

## 2024-03-19 ENCOUNTER — HOSPITAL ENCOUNTER (OUTPATIENT)
Facility: HOSPITAL | Age: 54
Setting detail: OUTPATIENT SURGERY
Discharge: HOME | End: 2024-03-19
Attending: SURGERY | Admitting: SURGERY
Payer: COMMERCIAL

## 2024-03-19 ENCOUNTER — APPOINTMENT (OUTPATIENT)
Facility: HOSPITAL | Age: 54
End: 2024-03-19
Attending: SURGERY
Payer: COMMERCIAL

## 2024-03-19 VITALS
BODY MASS INDEX: 16.22 KG/M2 | OXYGEN SATURATION: 95 % | HEIGHT: 64 IN | DIASTOLIC BLOOD PRESSURE: 79 MMHG | RESPIRATION RATE: 13 BRPM | TEMPERATURE: 98 F | SYSTOLIC BLOOD PRESSURE: 110 MMHG | WEIGHT: 95.02 LBS | HEART RATE: 73 BPM

## 2024-03-19 DIAGNOSIS — Z17.0 MALIGNANT NEOPLASM OF LEFT BREAST IN FEMALE, ESTROGEN RECEPTOR POSITIVE, UNSPECIFIED SITE OF BREAST (HCC): ICD-10-CM

## 2024-03-19 DIAGNOSIS — C50.912 MALIGNANT NEOPLASM OF LEFT BREAST IN FEMALE, ESTROGEN RECEPTOR POSITIVE, UNSPECIFIED SITE OF BREAST (HCC): ICD-10-CM

## 2024-03-19 PROCEDURE — 2500000003 HC RX 250 WO HCPCS

## 2024-03-19 PROCEDURE — 14301 TIS TRNFR ANY 30.1-60 SQ CM: CPT | Performed by: SURGERY

## 2024-03-19 PROCEDURE — 38525 BIOPSY/REMOVAL LYMPH NODES: CPT | Performed by: SURGERY

## 2024-03-19 PROCEDURE — 7100000000 HC PACU RECOVERY - FIRST 15 MIN: Performed by: SURGERY

## 2024-03-19 PROCEDURE — 88307 TISSUE EXAM BY PATHOLOGIST: CPT

## 2024-03-19 PROCEDURE — 2709999900 HC NON-CHARGEABLE SUPPLY: Performed by: SURGERY

## 2024-03-19 PROCEDURE — 7100000010 HC PHASE II RECOVERY - FIRST 15 MIN: Performed by: SURGERY

## 2024-03-19 PROCEDURE — 7100000011 HC PHASE II RECOVERY - ADDTL 15 MIN: Performed by: SURGERY

## 2024-03-19 PROCEDURE — 3600000013 HC SURGERY LEVEL 3 ADDTL 15MIN: Performed by: SURGERY

## 2024-03-19 PROCEDURE — 3600000003 HC SURGERY LEVEL 3 BASE: Performed by: SURGERY

## 2024-03-19 PROCEDURE — 19301 PARTIAL MASTECTOMY: CPT | Performed by: SURGERY

## 2024-03-19 PROCEDURE — 2580000003 HC RX 258: Performed by: SURGERY

## 2024-03-19 PROCEDURE — 6360000002 HC RX W HCPCS: Performed by: SURGERY

## 2024-03-19 PROCEDURE — 2580000003 HC RX 258

## 2024-03-19 PROCEDURE — A9520 TC99 TILMANOCEPT DIAG 0.5MCI: HCPCS | Performed by: SURGERY

## 2024-03-19 PROCEDURE — 3700000000 HC ANESTHESIA ATTENDED CARE: Performed by: SURGERY

## 2024-03-19 PROCEDURE — 78195 LYMPH SYSTEM IMAGING: CPT

## 2024-03-19 PROCEDURE — 88342 IMHCHEM/IMCYTCHM 1ST ANTB: CPT

## 2024-03-19 PROCEDURE — 3430000000 HC RX DIAGNOSTIC RADIOPHARMACEUTICAL: Performed by: SURGERY

## 2024-03-19 PROCEDURE — 3700000001 HC ADD 15 MINUTES (ANESTHESIA): Performed by: SURGERY

## 2024-03-19 PROCEDURE — 36590 REMOVAL TUNNELED CV CATH: CPT | Performed by: SURGERY

## 2024-03-19 PROCEDURE — 7100000001 HC PACU RECOVERY - ADDTL 15 MIN: Performed by: SURGERY

## 2024-03-19 PROCEDURE — 2580000003 HC RX 258: Performed by: ANESTHESIOLOGY

## 2024-03-19 PROCEDURE — 88331 PATH CONSLTJ SURG 1 BLK 1SPC: CPT

## 2024-03-19 PROCEDURE — 6360000002 HC RX W HCPCS

## 2024-03-19 PROCEDURE — 76998 US GUIDE INTRAOP: CPT | Performed by: SURGERY

## 2024-03-19 RX ORDER — DEXMEDETOMIDINE HYDROCHLORIDE 100 UG/ML
INJECTION, SOLUTION INTRAVENOUS PRN
Status: DISCONTINUED | OUTPATIENT
Start: 2024-03-19 | End: 2024-03-19 | Stop reason: SDUPTHER

## 2024-03-19 RX ORDER — OXYCODONE HYDROCHLORIDE AND ACETAMINOPHEN 5; 325 MG/1; MG/1
1 TABLET ORAL EVERY 4 HOURS PRN
Qty: 15 TABLET | Refills: 0 | Status: SHIPPED | OUTPATIENT
Start: 2024-03-19 | End: 2024-03-22

## 2024-03-19 RX ORDER — ONDANSETRON 2 MG/ML
4 INJECTION INTRAMUSCULAR; INTRAVENOUS
Status: DISCONTINUED | OUTPATIENT
Start: 2024-03-19 | End: 2024-03-19 | Stop reason: HOSPADM

## 2024-03-19 RX ORDER — SODIUM CHLORIDE, SODIUM LACTATE, POTASSIUM CHLORIDE, CALCIUM CHLORIDE 600; 310; 30; 20 MG/100ML; MG/100ML; MG/100ML; MG/100ML
INJECTION, SOLUTION INTRAVENOUS CONTINUOUS
Status: DISCONTINUED | OUTPATIENT
Start: 2024-03-19 | End: 2024-03-19 | Stop reason: HOSPADM

## 2024-03-19 RX ORDER — EPHEDRINE SULFATE/0.9% NACL/PF 25 MG/5 ML
SYRINGE (ML) INTRAVENOUS PRN
Status: DISCONTINUED | OUTPATIENT
Start: 2024-03-19 | End: 2024-03-19 | Stop reason: SDUPTHER

## 2024-03-19 RX ORDER — FENTANYL CITRATE 50 UG/ML
INJECTION, SOLUTION INTRAMUSCULAR; INTRAVENOUS PRN
Status: DISCONTINUED | OUTPATIENT
Start: 2024-03-19 | End: 2024-03-19 | Stop reason: SDUPTHER

## 2024-03-19 RX ORDER — FENTANYL CITRATE 50 UG/ML
100 INJECTION, SOLUTION INTRAMUSCULAR; INTRAVENOUS
Status: DISCONTINUED | OUTPATIENT
Start: 2024-03-19 | End: 2024-03-19 | Stop reason: HOSPADM

## 2024-03-19 RX ORDER — ONDANSETRON 2 MG/ML
INJECTION INTRAMUSCULAR; INTRAVENOUS PRN
Status: DISCONTINUED | OUTPATIENT
Start: 2024-03-19 | End: 2024-03-19 | Stop reason: SDUPTHER

## 2024-03-19 RX ORDER — LIDOCAINE HYDROCHLORIDE 10 MG/ML
1 INJECTION, SOLUTION EPIDURAL; INFILTRATION; INTRACAUDAL; PERINEURAL
Status: DISCONTINUED | OUTPATIENT
Start: 2024-03-19 | End: 2024-03-19 | Stop reason: HOSPADM

## 2024-03-19 RX ORDER — FAMOTIDINE 10 MG/ML
INJECTION, SOLUTION INTRAVENOUS PRN
Status: DISCONTINUED | OUTPATIENT
Start: 2024-03-19 | End: 2024-03-19 | Stop reason: SDUPTHER

## 2024-03-19 RX ORDER — PROPOFOL 10 MG/ML
INJECTION, EMULSION INTRAVENOUS PRN
Status: DISCONTINUED | OUTPATIENT
Start: 2024-03-19 | End: 2024-03-19 | Stop reason: SDUPTHER

## 2024-03-19 RX ORDER — MIDAZOLAM HYDROCHLORIDE 1 MG/ML
INJECTION INTRAMUSCULAR; INTRAVENOUS PRN
Status: DISCONTINUED | OUTPATIENT
Start: 2024-03-19 | End: 2024-03-19 | Stop reason: SDUPTHER

## 2024-03-19 RX ORDER — NALOXONE HYDROCHLORIDE 0.4 MG/ML
INJECTION, SOLUTION INTRAMUSCULAR; INTRAVENOUS; SUBCUTANEOUS PRN
Status: DISCONTINUED | OUTPATIENT
Start: 2024-03-19 | End: 2024-03-19 | Stop reason: HOSPADM

## 2024-03-19 RX ORDER — DIPHENHYDRAMINE HYDROCHLORIDE 50 MG/ML
12.5 INJECTION INTRAMUSCULAR; INTRAVENOUS
Status: DISCONTINUED | OUTPATIENT
Start: 2024-03-19 | End: 2024-03-19 | Stop reason: HOSPADM

## 2024-03-19 RX ORDER — SODIUM CHLORIDE, SODIUM LACTATE, POTASSIUM CHLORIDE, CALCIUM CHLORIDE 600; 310; 30; 20 MG/100ML; MG/100ML; MG/100ML; MG/100ML
INJECTION, SOLUTION INTRAVENOUS CONTINUOUS PRN
Status: DISCONTINUED | OUTPATIENT
Start: 2024-03-19 | End: 2024-03-19 | Stop reason: SDUPTHER

## 2024-03-19 RX ORDER — BUPIVACAINE HYDROCHLORIDE 5 MG/ML
30 INJECTION, SOLUTION EPIDURAL; INTRACAUDAL ONCE
Status: DISCONTINUED | OUTPATIENT
Start: 2024-03-19 | End: 2024-03-19 | Stop reason: HOSPADM

## 2024-03-19 RX ORDER — DEXAMETHASONE SODIUM PHOSPHATE 4 MG/ML
INJECTION, SOLUTION INTRA-ARTICULAR; INTRALESIONAL; INTRAMUSCULAR; INTRAVENOUS; SOFT TISSUE PRN
Status: DISCONTINUED | OUTPATIENT
Start: 2024-03-19 | End: 2024-03-19 | Stop reason: SDUPTHER

## 2024-03-19 RX ORDER — MIDAZOLAM HYDROCHLORIDE 2 MG/2ML
2 INJECTION, SOLUTION INTRAMUSCULAR; INTRAVENOUS
Status: DISCONTINUED | OUTPATIENT
Start: 2024-03-19 | End: 2024-03-19 | Stop reason: HOSPADM

## 2024-03-19 RX ORDER — LIDOCAINE HYDROCHLORIDE AND EPINEPHRINE 10; 10 MG/ML; UG/ML
30 INJECTION, SOLUTION INFILTRATION; PERINEURAL ONCE
Status: DISCONTINUED | OUTPATIENT
Start: 2024-03-19 | End: 2024-03-19 | Stop reason: HOSPADM

## 2024-03-19 RX ADMIN — SODIUM CHLORIDE, POTASSIUM CHLORIDE, SODIUM LACTATE AND CALCIUM CHLORIDE: 600; 310; 30; 20 INJECTION, SOLUTION INTRAVENOUS at 10:05

## 2024-03-19 RX ADMIN — FENTANYL CITRATE 25 MCG: 50 INJECTION, SOLUTION INTRAMUSCULAR; INTRAVENOUS at 10:30

## 2024-03-19 RX ADMIN — SODIUM CHLORIDE, POTASSIUM CHLORIDE, SODIUM LACTATE AND CALCIUM CHLORIDE: 600; 310; 30; 20 INJECTION, SOLUTION INTRAVENOUS at 09:06

## 2024-03-19 RX ADMIN — WATER 2000 MG: 1 INJECTION INTRAMUSCULAR; INTRAVENOUS; SUBCUTANEOUS at 10:25

## 2024-03-19 RX ADMIN — PROPOFOL 100 MCG/KG/MIN: 10 INJECTION, EMULSION INTRAVENOUS at 10:12

## 2024-03-19 RX ADMIN — FAMOTIDINE 20 MG: 10 INJECTION, SOLUTION INTRAVENOUS at 10:39

## 2024-03-19 RX ADMIN — PROPOFOL 50 MG: 10 INJECTION, EMULSION INTRAVENOUS at 10:40

## 2024-03-19 RX ADMIN — DEXAMETHASONE SODIUM PHOSPHATE 8 MG: 4 INJECTION INTRA-ARTICULAR; INTRALESIONAL; INTRAMUSCULAR; INTRAVENOUS; SOFT TISSUE at 10:38

## 2024-03-19 RX ADMIN — ONDANSETRON 4 MG: 2 INJECTION INTRAMUSCULAR; INTRAVENOUS at 10:39

## 2024-03-19 RX ADMIN — DEXMEDETOMIDINE 6 MCG: 100 INJECTION, SOLUTION INTRAVENOUS at 11:06

## 2024-03-19 RX ADMIN — DEXMEDETOMIDINE 4 MCG: 100 INJECTION, SOLUTION INTRAVENOUS at 11:20

## 2024-03-19 RX ADMIN — FENTANYL CITRATE 25 MCG: 50 INJECTION, SOLUTION INTRAMUSCULAR; INTRAVENOUS at 10:47

## 2024-03-19 RX ADMIN — FENTANYL CITRATE 25 MCG: 50 INJECTION, SOLUTION INTRAMUSCULAR; INTRAVENOUS at 10:11

## 2024-03-19 RX ADMIN — FENTANYL CITRATE 50 MCG: 50 INJECTION, SOLUTION INTRAMUSCULAR; INTRAVENOUS at 11:10

## 2024-03-19 RX ADMIN — FENTANYL CITRATE 25 MCG: 50 INJECTION, SOLUTION INTRAMUSCULAR; INTRAVENOUS at 10:27

## 2024-03-19 RX ADMIN — EPHEDRINE SULFATE 5 MG: 5 INJECTION INTRAVENOUS at 10:25

## 2024-03-19 RX ADMIN — FENTANYL CITRATE 25 MCG: 50 INJECTION, SOLUTION INTRAMUSCULAR; INTRAVENOUS at 10:40

## 2024-03-19 RX ADMIN — MIDAZOLAM HYDROCHLORIDE 2 MG: 1 INJECTION, SOLUTION INTRAMUSCULAR; INTRAVENOUS at 10:05

## 2024-03-19 RX ADMIN — PROPOFOL 50 MG: 10 INJECTION, EMULSION INTRAVENOUS at 10:29

## 2024-03-19 RX ADMIN — PROPOFOL 150 MG: 10 INJECTION, EMULSION INTRAVENOUS at 10:11

## 2024-03-19 RX ADMIN — TILMANOCEPT 0.5 MILLICURIE: KIT at 07:44

## 2024-03-19 RX ADMIN — FENTANYL CITRATE 25 MCG: 50 INJECTION, SOLUTION INTRAMUSCULAR; INTRAVENOUS at 10:06

## 2024-03-19 ASSESSMENT — PAIN SCALES - GENERAL
PAINLEVEL_OUTOF10: 0

## 2024-03-19 ASSESSMENT — PAIN - FUNCTIONAL ASSESSMENT: PAIN_FUNCTIONAL_ASSESSMENT: 0-10

## 2024-03-19 NOTE — DISCHARGE INSTRUCTIONS
probably hurt if you had abdominal surgery.  But doing this exercise is very important part of healing after surgery.  Take your pain medicine to help you do this exercise without too much pain.      ANKLE PUMPS    Ankle pumps increase the circulation of oxygenated blood to your lower extremities and decrease your risk for circulation problems such as blood clots. They also stretch the muscles, tendons and ligaments in your foot and ankle, and prevent joint contracture in the ankle and foot, especially after surgeries on the legs.    It is important to do ankle pump exercises regularly after surgery because immobility increases your risk for developing a blood clot.  Your doctor may also have you take an Aspirin for the next few days as well.      If your doctor did not ask you to take an Aspirin, consult with him before starting Aspirin therapy on your own.    The exercise is quite simple.     Slowly point your foot forward, feeling the muscles on the top of your lower leg stretch, and hold this position for 5 seconds.                  Next, pull your foot back toward you as far as possible, stretching the calf muscles, and hold that position for 5 seconds.                 Repeat with the other foot.  Perform 10 repetitions every hour while awake for both ankles if possible (down and then up with the foot once is one repetition).    You should feel gentle stretching of the muscles in your lower leg when doing this exercise.  If you feel pain, or your range of motion is limited, don't push too hard.  Only go the limit your joint and muscles will let you go.  If you have increasing pain, progressively worsening leg warmth or swelling, STOP the exercise and call your doctor.       Other Wound Care information:  [x] No additional recommendations.                        Below is information on the medication(s) your doctor is prescribing for you:           The maximum daily dose of acetaminophen was discussed with the  health organizations are tracking and studying this virus. Their websites contain the most up-to-date information. You'll also learn what to do if you think you may have been exposed to the virus.  U.S. Centers for Disease Control and Prevention (CDC): The CDC provides updated news about the disease and travel advice. The website also tells you how to prevent the spread of infection. www.cdc.gov  World Health Organization (WHO): WHO offers information about the virus outbreaks. WHO also has travel advice. www.who.int  Current as of: April 1, 2020               Content Version: 12.4  © 8933-1230 [employer].   Care instructions adapted under license by your healthcare professional. If you have questions about a medical condition or this instruction, always ask your healthcare professional. [employer] disclaims any warranty or liability for your use of this information.    AT THE COMPLETION OF DISCHARGE INSTRUCTION REVIEW, WE VERIFY:  The discharge information has been reviewed with the patient and caregiver.    Questions have been asked and answered meeting patient and caregiver expectations.  The patient and caregiver verbalized understanding.    Your discharge nurse was Bobby MARCH, TARAH-BC       Board Certified - Pain Management      CONTENTS FOUND IN YOUR DISCHARGE ENVELOPE:  [x]     Surgeon and Hospital Discharge Instructions  []     [facility] Care Provider Card  []     Medication & Side Effect Guide            (your newly prescribed medications have been marked/highlighted showing the most common side effects from the classes of drugs on your prescriptions)  [x]     Medication Prescription(s)     x 1         ( [x] These have been sent electronically to your pharmacy by your surgeon,   - OR -                       your surgeon has already provided these to you during a previous/pre-op office visit)  []     Pain block and/or block with On-Q Catheter from Anesthesia Service

## 2024-03-19 NOTE — PERIOP NOTE
POST ANESTHESIA CARE    DISCHARGE / TRANSFER NOTE  Madelin Parekh was:    [x] discharged        via   [x] Wheelchair          to [x] Private Vehicle     [] transferred    [] Carried    [] Taxi / Vehicle \"for Hire\"  [] Walk out   [] Ambulance / Medical Transportation   [] Stretcher   [] Hospital room _**_           [] Bed      Patient was escorted by:      [x] Nurse   [] Volunteer  [] Transporter / Technician  [] Parent      [] Spouse / Family /      Patient verbalized     [x] appreciation and was very pleased with care received   [] frustration with care received       throughout their stay.    Patient was discharged in     [x] pleasant mood  [] sad mood  [] mad mood .     Pain at discharge/transfer was      0  /10.    Discharge, medication and follow-up instructions were verbalized as understood prior to discharge  (if applicable for same-day procedures being discharged.)    All personal belongings have been returned to patient, and patient/family verbally confirm receiving belongings as all present.

## 2024-03-19 NOTE — H&P
HISTORY OF PRESENT ILLNESS  Madelin Parekh is a 53 y.o. female.     HPI  ESTABLISHED patient here today for follow up LEFT breast cancer. She is receiving neoadjuvant chemotherapy that should be completed by 2/2024. The patient is unable to palpate her breast and axilla masses. She is accompanied by her . The patient would like to discuss lumpectomy vs. mastectomy once again.       7/18/23: LEFT breast, Stellate Mass, Stereotatic biopsy: invasive and in situ carcinoma, grade 2. Largest tumor focus measured 4mm. No lymphovascular invasion seen. ER+(98%)/SC+(54%)/HER2-, Ki-67(59%), p53.         Past Medical History        Past Medical History:   Diagnosis Date    Anxiety      Invasive ductal carcinoma of breast, female, left (HCC) 07/24/2023 7/18/23: LEFT breast, Stellate Mass, Stereotatic biopsy: invasive and in situ carcinoma, grade 2. Largest tumor focus measured 4mm. No lymphovascular invasion seen. Prognostic markers pending.    Murmur      PONV (postoperative nausea and vomiting)              Past Surgical History         Past Surgical History:   Procedure Laterality Date    APPENDECTOMY   2000    COLONOSCOPY   2007    ENDOSCOPY, COLON, DIAGNOSTIC        GI   2007     COLONOSCOPY    NASAL SEPTUM SURGERY   2012    PORT SURGERY Right 8/29/2023     LUZ MARIA CATH PLACEMENT, ULTRASOUND GUIDED CORE BIOPSY OF LEFT BREAST MASS performed by Ton DILLARD MD at [facility] AMBUL[facility]ORY OR    SC UNLISTED PROCEDURE ABDOMEN PERITONEUM & OMENTUM   2000     BOWEL OBSTRUCTION    US BREAST BIOPSY W LOC DEVICE 1ST LESION LEFT Left 08/01/2023     US BREAST LYMPH NODE BIOPSY LEFT    US BREAST BIOPSY W LOC DEVICE 1ST LESION LEFT Left 08/01/2023     US BREAST LYMPH NODE BIOPSY LEFT    WISDOM TOOTH EXTRACTION                Social History               Socioeconomic History    Marital status:        Spouse name: Not on file    Number of children: Not on file    Years of education: Not on file    Highest education level:

## 2024-03-19 NOTE — BRIEF OP NOTE
Brief Postoperative Note      Patient: Madelin Parekh  YOB: 1970  MRN: [id number]    Date of Procedure: 3/19/2024    Pre-Op Diagnosis Codes:     * Malignant neoplasm of left breast (HCC) [C50.912]    Post-Op Diagnosis: Same       Procedure(s):  LEFT BREAST LUMPECTOMY WITH ULTRASOUND AND LEFT BREAST SENTINEL NODE BIOPSY, PORTACATH REMOVAL    Surgeon(s):  Ton Forrest Jr, MD    Assistant:  Surgical Assistant: Yusuf Dukes    Anesthesia: General    Estimated Blood Loss (mL): Minimal    Complications: None    Specimens:   ID Type Source Tests Collected by Time Destination   1 : sentinel node #1 left axilla Tissue Lymph Node SURGICAL PATHOLOGY Ton Forrest Jr, MD 3/19/2024 1043    2 : sentinel node #2 left axilla Tissue Lymph Node SURGICAL PATHOLOGY Ton Forrest Jr, MD 3/19/2024 1046    3 : sentinel node #3 left axilla Tissue Lymph Node SURGICAL PATHOLOGY Ton Forrest Jr, MD 3/19/2024 1047    4 : sentinel node #4 left axilla Tissue Lymph Node SURGICAL PATHOLOGY Ton Forrest Jr, MD 3/19/2024 1048    5 : sentinel node #5 left axilla Tissue Lymph Node SURGICAL PATHOLOGY Ton Forrest Jr, MD 3/19/2024 1051    6 : sentinel node #6 left axilla Tissue Lymph Node SURGICAL PATHOLOGY Ton Forrest Jr, MD 3/19/2024 1105    7 : left breast lumpectomy single suture Anterior, Short double suture Superior, long double suture Lateral Tissue Breast SURGICAL PATHOLOGY Ton Forrest Jr, MD 3/19/2024 1114    8 : non sentinel lymph node  left axilla Tissue Lymph Node SURGICAL PATHOLOGY Ton Forrest Jr, MD 3/19/2024 1116        Implants:  * No implants in log *      Drains: * No LDAs found *    Findings: spec radiograph clips x 2, sent node x 7 all neg, ITC in one, no clip found in node           Electronically signed by Ton Forrest MD on 3/19/2024 at 11:40 AM

## 2024-03-19 NOTE — ANESTHESIA PRE PROCEDURE
Department of Anesthesiology  Preprocedure Note       Name:  Madelin Parekh   Age:  54 y.o.  :  1970                                          MRN:  [id number]         Date:  3/19/2024      Surgeon: Surgeon(s):  Lon Ferreira, Ton DILLARD MD    Procedure: Procedure(s):  LEFT BREAST LUMPECTOMY WITH ULTRASOUND AND LEFT BREAST SENTINEL NODE BIOPSY    Medications prior to admission:   Prior to Admission medications    Medication Sig Start Date End Date Taking? Authorizing Provider   potassium chloride (KLOR-CON M) 20 MEQ extended release tablet Take 1 tablet by mouth every morning    Francoise Oneal MD   ALPRAZolam (XANAX) 0.5 MG tablet Take 1 tablet by mouth as needed for Sleep.    Francoise Oneal MD   apixaban (ELIQUIS) 5 MG TABS tablet Take 1 tablet by mouth 2 times daily 24   Marianna Gonsalves APRN - NP   Docusate Sodium (COLACE PO) Take by mouth    Francoise Oneal MD   Loperamide HCl (IMODIUM PO) Take by mouth    Francoise Oneal MD   sertraline (ZOLOFT) 25 MG tablet Take 1 tablet by mouth every morning    Francoise Oneal MD   clonazePAM (KLONOPIN) 0.5 MG tablet Take 1 tablet by mouth 2 times daily as needed for Anxiety.    ProviderFrancoise MD   Cholecalciferol 50 MCG ( UT) TABS Take 1 tablet by mouth every morning    Automatic Reconciliation, Ar   zolpidem (AMBIEN) 5 MG tablet Take 1 tablet by mouth nightly as needed. 5/3/22   Automatic Reconciliation, Ar       Current medications:    No current facility-administered medications for this encounter.     Current Outpatient Medications   Medication Sig Dispense Refill   • potassium chloride (KLOR-CON M) 20 MEQ extended release tablet Take 1 tablet by mouth every morning     • ALPRAZolam (XANAX) 0.5 MG tablet Take 1 tablet by mouth as needed for Sleep.     • apixaban (ELIQUIS) 5 MG TABS tablet Take 1 tablet by mouth 2 times daily 60 tablet 5   • Docusate Sodium (COLACE PO) Take by mouth     • Loperamide HCl (IMODIUM PO)

## 2024-03-19 NOTE — PERIOP NOTE
PACU-IN REPORT FROM ANESTHESIA    Verbal report received from   Sheila   [] MD/-Anesthesiologist    [x] CRNA   [] with student    CHOICE ANESTHESIA:  [] GENERAL  [] TIVA  [x] MAC  [x] LOCAL  [] REGIONAL  [] SPINAL   [] EPIDURAL   **Note the anesthesia record for medications given intraoperatively.**           [] E.R.A.S. PROTOCOL    SURGICAL PROCEDURE: Procedure(s) (LRB):  LEFT BREAST LUMPECTOMY WITH ULTRASOUND AND LEFT BREAST SENTINEL NODE BIOPSY, PORTACATH REMOVAL (Left)     SURGEON: Lon Ferreira, Ton DILLARD MD.    Brief Initial Visual Assessment:    Patient Age: [] Infant(1-12mo)       []Pediatric(1-13yrs)    [] Adolescent(13-18yrs)     [x] Adult(18-65yrs)      []Geriatric Adult(>65yrs).                   Patient    [] Alert           []Calm & Cooperative      [] Anxious/Restless      [] Combative  Appearance:  [] Drowsy      [] Confused/Disoriented     [x] Sedated      [] Unresponsive     Oriented x  0            Airway:     [x] Patent          [] \"Difficult Airway\" report by Anesthesia                        [] Obstructs easily/Obstructed on arrival          [] Manual stimulation and/or airway assistance necessary                         [] Airway improved with head/airway repositioning                       Airway Adjuncts Present: [] Oral Airway    [] Nasal Trumpet    [] ETT    [] LMA            Respiratory  [x] Even   [] Labored   [] Shallow   [] Tachypnea (>28 RR/min)  [] Bradypnea (<10 RR/min)  Pattern:    [x] Non-Labored  [] VENT and/or respiratory assistance     being provided.        Skin:     [x] Pink [] Dusky    [] Pale         [x] Warm     [] Hot [] Cool       [] Cold    [x]Dry     [] Moist [] Diaphoretic     Membranes:  [x] Pink    [] Pale        [x] Moist [] Dry     [] Crusty     Pain:   [x] No Acute Discomfort.    0  /10 Scale [x] Verbal Numeric / Visual   [] Moderate Discomfort.      [] V.A.S.    [] Acute Discomfort.                 [] A.N.V.    [] Chronic-Issue Related Discomfort.   []

## 2024-03-19 NOTE — ANESTHESIA POSTPROCEDURE EVALUATION
Department of Anesthesiology  Postprocedure Note    Patient: Madelin Parekh  MRN: [id number]  YOB: 1970  Date of evaluation: 3/19/2024    Procedure Summary       Date: 03/19/24 Room / Location: [facility] ASU OR [address] AMBULATORY OR    Anesthesia Start: 1005 Anesthesia Stop: 1148    Procedure: LEFT BREAST LUMPECTOMY WITH ULTRASOUND AND LEFT BREAST SENTINEL NODE BIOPSY, PORTACATH REMOVAL (Left: Breast) Diagnosis:       Malignant neoplasm of left breast (HCC)      (Malignant neoplasm of left breast (HCC) [C50.912])    Surgeons: Ton Forrest Jr, MD Responsible Provider: Jey Zazueta MD    Anesthesia Type: General ASA Status: 3            Anesthesia Type: General    Jason Phase I: Jason Score: 9    Jason Phase II: Jason Score: 9    Anesthesia Post Evaluation    Patient location during evaluation: PACU  Patient participation: complete - patient participated  Level of consciousness: awake  Pain score: 0  Airway patency: patent  Nausea & Vomiting: no nausea and no vomiting  Cardiovascular status: blood pressure returned to baseline  Respiratory status: acceptable  Hydration status: euvolemic  Multimodal analgesia pain management approach  Pain management: adequate    No notable events documented.

## 2024-03-20 NOTE — OP NOTE
[facility]          [address] [place]  [address]                            OPERATIVE REPORT      PATIENT NAME: JULIO CESAR ROMAN             : 1970  MED REC NO: [id number]                       ROOM: [facility]  ACCOUNT NO: [id number]                       ADMIT DATE: 2024  PROVIDER: Ton Forrest Jr, MD    DATE OF SERVICE:  2024    PREOPERATIVE DIAGNOSES:  Carcinoma of the left breast.    POSTOPERATIVE DIAGNOSES:  Carcinoma of the left breast.    PROCEDURES PERFORMED:  Left lumpectomy with intraoperative ultrasound, left sentinel node biopsy, and Port-A-Cath removal.    SURGEON:  Ton Forrest Jr, MD    ASSISTANT:  Brant.    ANESTHESIA:  General.    ESTIMATED BLOOD LOSS:  Minimal.    SPECIMENS REMOVED:  Left axillary sentinel lymph nodes x7 and left breast mass with margins.    INTRAOPERATIVE FINDINGS:  The left axillary sentinel lymph nodes were all negative on frozen section.  A specimen radiograph of breast revealed the presence of both clips.     COMPLICATIONS:  None.    IMPLANTS:  None.    INDICATIONS:  The patient is a 54-year-old female, status post neoadjuvant chemotherapy for carcinoma of the left breast.  She has completed treatments, initially decided to have bilateral mastectomies but eventually opted for lumpectomy.  She was admitted for surgery.    DESCRIPTION OF PROCEDURE:  After lymphoscintigraphy in Radiology, the patient was brought to the operating room.  After satisfactory induction of general LMA anesthesia, the patient was prepped and draped in sterile fashion.  Attention was turned to the Port-A-Cath site first, where the Port-A-Cath incision was opened.  The Port-A-Cath was identified and removed and noted to be intact.  The wound was hemostatic, closed with interrupted 3-0 Vicryl and running subcuticular 4-0 Monocryl on skin after infiltration of 0.5% Marcaine.  Attention was turned to the left side where the  non-colored) present at the end of a dye-filled lymphatic channel were removed. N/A   All significantly radioactive nodes were removed. Yes   All palpably suspicious nodes were removed. N/A   Biopsy-proven positive nodes marked with clips prior to chemotherapy were identified and removed. No Could not find one took 8 LN none with clip                MD SEKOU MARIE JR/AQS  D:  03/19/2024 14:34:55  T:  03/19/2024 20:45:36  JOB #:  [id number]    CC:   MD Chintan Sweeney MD

## 2024-03-22 ENCOUNTER — TELEPHONE (OUTPATIENT)
Age: 54
End: 2024-03-22

## 2024-03-25 ENCOUNTER — TELEPHONE (OUTPATIENT)
Age: 54
End: 2024-03-25

## 2024-03-25 NOTE — TELEPHONE ENCOUNTER
Patient called and stated that she would like to move her appt to the first week of April so her  will be able to attend. Please advise.       CB# [phone]  
yes

## 2024-03-27 ENCOUNTER — TELEPHONE (OUTPATIENT)
Age: 54
End: 2024-03-27

## 2024-03-27 NOTE — TELEPHONE ENCOUNTER
Patient called, she reports an uncomfortable area of swelling near her incision.  It is not red, but she says it is quite uncomfortable and would like to come in and see if its a seroma that can be drained.  I transferred her to make an appointment.

## 2024-03-28 ENCOUNTER — OFFICE VISIT (OUTPATIENT)
Age: 54
End: 2024-03-28

## 2024-03-28 VITALS — HEIGHT: 64 IN | BODY MASS INDEX: 16.22 KG/M2 | WEIGHT: 95 LBS

## 2024-03-28 DIAGNOSIS — N64.89 SEROMA OF BREAST: ICD-10-CM

## 2024-03-28 DIAGNOSIS — Z98.890 S/P LUMPECTOMY OF BREAST: ICD-10-CM

## 2024-03-28 DIAGNOSIS — Z17.0 MALIGNANT NEOPLASM OF LEFT BREAST IN FEMALE, ESTROGEN RECEPTOR POSITIVE, UNSPECIFIED SITE OF BREAST (HCC): Primary | ICD-10-CM

## 2024-03-28 DIAGNOSIS — C50.912 MALIGNANT NEOPLASM OF LEFT BREAST IN FEMALE, ESTROGEN RECEPTOR POSITIVE, UNSPECIFIED SITE OF BREAST (HCC): Primary | ICD-10-CM

## 2024-03-28 PROCEDURE — 99024 POSTOP FOLLOW-UP VISIT: CPT | Performed by: NURSE PRACTITIONER

## 2024-03-28 NOTE — PROGRESS NOTES
2. S/P lumpectomy of breast        3. Seroma of breast            Post-op healing well.  Incisions CDI x 2.  Resolving ecchymosis to breast.  LEFT axillary seroma - drained of 58ml of clear serous fluid without issue.  Has follow-up with Dr. Matt and Dr. Forrest scheduled in the next 10 days.  Follow-up sooner PRN.  She is comfortable with this plan.  All questions answered and she stated understanding.

## 2024-04-01 ENCOUNTER — OFFICE VISIT (OUTPATIENT)
Age: 54
End: 2024-04-01
Payer: COMMERCIAL

## 2024-04-01 VITALS
OXYGEN SATURATION: 99 % | WEIGHT: 97.7 LBS | SYSTOLIC BLOOD PRESSURE: 112 MMHG | TEMPERATURE: 98 F | DIASTOLIC BLOOD PRESSURE: 71 MMHG | RESPIRATION RATE: 16 BRPM | BODY MASS INDEX: 16.77 KG/M2 | HEART RATE: 78 BPM

## 2024-04-01 DIAGNOSIS — C50.112 MALIGNANT NEOPLASM OF CENTRAL PORTION OF LEFT BREAST IN FEMALE, ESTROGEN RECEPTOR POSITIVE (HCC): Primary | ICD-10-CM

## 2024-04-01 DIAGNOSIS — C50.112 MALIGNANT NEOPLASM OF CENTRAL PORTION OF LEFT BREAST IN FEMALE, ESTROGEN RECEPTOR POSITIVE (HCC): ICD-10-CM

## 2024-04-01 DIAGNOSIS — Z17.0 MALIGNANT NEOPLASM OF CENTRAL PORTION OF LEFT BREAST IN FEMALE, ESTROGEN RECEPTOR POSITIVE (HCC): ICD-10-CM

## 2024-04-01 DIAGNOSIS — Z17.0 MALIGNANT NEOPLASM OF CENTRAL PORTION OF LEFT BREAST IN FEMALE, ESTROGEN RECEPTOR POSITIVE (HCC): Primary | ICD-10-CM

## 2024-04-01 PROCEDURE — 99214 OFFICE O/P EST MOD 30 MIN: CPT | Performed by: INTERNAL MEDICINE

## 2024-04-01 RX ORDER — NICOTINE POLACRILEX 2 MG
GUM BUCCAL
COMMUNITY

## 2024-04-01 NOTE — PROGRESS NOTES
[facility] [place] [facility]  [address], [address] [facility] [address]  W: [phone]  F: [phone]      Reason for Visit:   Madelin Parekh is a 54 y.o. female who is seen in follow up for breast cancer.    Breast Surgeon: Dr. Forrest  Rad onc:  Dr. Camp    Treatment History:   7/18/23 left breast stellate mass, core bx:  IDC, gr 2, 4 mm, ER + at 98%, RI + at 54%, HER 2 negative at IHC 1+ (SOHA ration 1; sig/cell 1.85), ki67 59%, DCIS, no LVI  Mammaprint shows luminal B, high risk, index -0.160  8/1/23 left axilla LN core bx:  + for breast cancer  Genetic testing , negative BRCA 1/2 by [employer] 2021  NSABP FB-12  AC 9/20/23 - 11/1/23  Paclitaxel/trastuzumab/pertuzumab 11/15/23-2/27/24  Held taxol due to ANC 0.9 on c1d1  Held taxol due to ANC 0.7 on C2d1  c3d8 skipped for neutropenia  C3d15 skipped for neutropenia   taxol at c4d1 to 65 mg/m2  3/19/24 left breast lumpectomy:  gr 1, 1 cm, DCIS, gr 3, cribriform, + LVI, 1/11 LN + with 0.6 mm micromet, ypT1b dpC1nkz cM0    History of Present Illness:   An abnormal mammogram led to the pathology above.      Interval history:  Patient presents today for follow up and treatment. Reports gr 1 constipation, gr 1 insomnia, gr 1 anxiety, gr 1 mouth sores, gr 1 vaginal dryness, gr 1 loss of libido    FH:  mother with breast cancer, dx at age 83; maternal aunt with breast cancer, dx 50-60s and lung cancer; maternal aunt:  ovarian cancer, dx 40s; father with colon cancer dx 60s    Lmp 8/2023    Past Medical History:   Diagnosis Date    Anxiety     Invasive ductal carcinoma of breast, female, left (HCC) 07/24/2023 7/18/23: LEFT breast, Stellate Mass, Stereotatic biopsy: invasive and in situ carcinoma, grade 2. Largest tumor focus measured 4mm. No lymphovascular invasion seen. Prognostic markers pending.    Murmur     PONV (postoperative nausea and vomiting)       Past Surgical History:   Procedure Laterality Date    APPENDECTOMY

## 2024-04-01 NOTE — PROGRESS NOTES
Madelin Parekh is a 54 y.o. female here today to follow up for breast cancer    1. Have you been to the ER, urgent care clinic since your last visit?  Hospitalized since your last visit? no    2. Have you seen or consulted any other health care providers outside of the [facility] since your last visit?  Include any pap smears or colon screening.  no

## 2024-04-02 ENCOUNTER — TELEPHONE (OUTPATIENT)
Age: 54
End: 2024-04-02

## 2024-04-02 NOTE — TELEPHONE ENCOUNTER
[facility]  Oncology Social Work Encounter    [] Med-Onc [facility] [] Med-Onc [facility] [x] Med-Onc [facility] [] Rad-Onc [facility] [] Rad-Onc [facility] [] Rad-Onc [facility] [] Rad-Onc [facility] [] Breast Center    Patient: Madelin Parekh    Encounter Type:    [] Initial SW Encounter  [] Patient Initiated  [x] Referral  [] Distress/PHQ Screening  [] Other:      Concern(s)/Barrier(s) to Care: behavioral health referral     Narrative: Referral received from counseling resources. Called and spoke with patient. She tells me receiving counseling but is interested in finding another clinician who is practices CBT and take in-person clients.     Recommended:   Lionel Crook LCSW  [facility]  [phone]  [email or web address]     [facility]   [phone]    Referral/Handouts:      Behavioral health referral    Plan:   Counseling recommendation provided. Patient advised she will contact recommendation to schedule an appointment.     Simi Torres LCSW  Clinical Social Work Medical Oncology   [facility]  [address]  [place]  [address]  W: [phone]  F: [phone]

## 2024-04-05 LAB
FSH SERPL-ACNC: 57.9 MIU/ML
LH SERPL-ACNC: 31.8 MIU/ML

## 2024-04-08 ENCOUNTER — OFFICE VISIT (OUTPATIENT)
Age: 54
End: 2024-04-08

## 2024-04-08 VITALS — HEIGHT: 64 IN | WEIGHT: 95 LBS | BODY MASS INDEX: 16.22 KG/M2

## 2024-04-08 DIAGNOSIS — Z98.890 S/P LUMPECTOMY OF BREAST: Primary | ICD-10-CM

## 2024-04-08 NOTE — PROGRESS NOTES
HISTORY OF PRESENT ILLNESS  Madelin Parekh is a 54 y.o. female.    HPI  ESTABLISHED patient here today for post op follow up LEFT lumpectomy 3/19/24. The patient is doing well and radiation oncology ( Dr. Andrade ) appointment tomorrow. The bruising and pain has decreased greatly. The patient denies any swelling     7/18/23: LEFT breast, Stellate Mass, Stereotatic biopsy: invasive and in situ carcinoma, grade 2. Largest tumor focus measured 4mm. No lymphovascular invasion seen. ER+(98%)/MO+(54%)/HER2-, Ki-67(59%), p53,     3/19/24: LEFT breast lumpectomy and LEFT SLNBx + PAC removal. PATH:   - LEFT axilla, sentinel LN, excision: One lymph out of seven nodes identified, positive for metastatic carcinoma (1mi/7).   - LEFT breast lumpectomy: IDC, [place] gr 2, 10 mm in greatest dimension. DCIS present, high nuclear grade, cribriform type. Microcalcifications associated with DCIS. Invasive carcinoma is 0.8 mm from closest (posterior) margin. DCIS is 0.3 mm from closest (posterior) margin. pT1b, pN1mi (sn)  - LEFT axilla, lymph node excision: Four lymph nodes identified, negative for metastatic carcinoma (0/4).       Past Medical History:   Diagnosis Date    Anxiety     Breast cancer (HCC)     Hx antineoplastic chemo     Hx of blood clots     near port a cath area- eliquis therapy    Invasive ductal carcinoma of breast, female, left (HCC) 07/24/2023 7/18/23: LEFT breast, Stellate Mass, Stereotatic biopsy: invasive and in situ carcinoma, grade 2. Largest tumor focus measured 4mm. No lymphovascular invasion seen. Prognostic markers pending.    Murmur     PONV (postoperative nausea and vomiting)        Past Surgical History:   Procedure Laterality Date    APPENDECTOMY  2000    BREAST LUMPECTOMY Left 3/19/2024    LEFT BREAST LUMPECTOMY WITH ULTRASOUND AND LEFT BREAST SENTINEL NODE BIOPSY, PORTACATH REMOVAL performed by Lon Ferreira, Ton DILLARD MD at [facility] AMBULATORY OR    COLONOSCOPY  2007    ENDOSCOPY, COLON, DIAGNOSTIC

## 2024-04-09 ENCOUNTER — HOSPITAL ENCOUNTER (OUTPATIENT)
Facility: HOSPITAL | Age: 54
Discharge: HOME | End: 2024-04-12
Payer: COMMERCIAL

## 2024-04-09 VITALS
BODY MASS INDEX: 16.9 KG/M2 | SYSTOLIC BLOOD PRESSURE: 114 MMHG | HEART RATE: 62 BPM | HEIGHT: 64 IN | WEIGHT: 99 LBS | RESPIRATION RATE: 18 BRPM | DIASTOLIC BLOOD PRESSURE: 65 MMHG

## 2024-04-09 DIAGNOSIS — C50.412 MALIGNANT NEOPLASM OF UPPER-OUTER QUADRANT OF LEFT BREAST IN FEMALE, ESTROGEN RECEPTOR POSITIVE (HCC): Primary | ICD-10-CM

## 2024-04-09 DIAGNOSIS — Z17.0 MALIGNANT NEOPLASM OF UPPER-OUTER QUADRANT OF LEFT BREAST IN FEMALE, ESTROGEN RECEPTOR POSITIVE (HCC): Primary | ICD-10-CM

## 2024-04-09 PROCEDURE — 99203 OFFICE O/P NEW LOW 30 MIN: CPT

## 2024-04-09 ASSESSMENT — PAIN SCALES - GENERAL: PAINLEVEL_OUTOF10: 0

## 2024-04-09 NOTE — CONSULTS
vacation.    Ms. Parekh knows to call or return any time with any additional questions or concerns in the interim.    -     Thank you for the opportunity to participate in the care of Ms. Parekh. Please feel free to contact me with any questions or concerns.    Christopher Camp MD  [facility]  [facility]  [id number] [place] [id number]  P: [phone]  [facility] [address], [place] [address]  P: [phone]  [facility]  [address] [place] [address], [address] [address]  P: [phone]    Time and Counseling: I spent a total of 90 minutes in care of this patient during today's encounter on 4/9/24.    Carbon Copy:  Chintan Forrest

## 2024-04-10 LAB — ESTRADIOL SERPL HS-MCNC: <2.5 PG/ML

## 2024-04-24 ENCOUNTER — OFFICE VISIT (OUTPATIENT)
Age: 54
End: 2024-04-24

## 2024-04-24 VITALS — WEIGHT: 99 LBS | HEIGHT: 64 IN | BODY MASS INDEX: 16.9 KG/M2

## 2024-04-24 DIAGNOSIS — N64.89 HEMATOMA OF LEFT BREAST: Primary | ICD-10-CM

## 2024-04-24 NOTE — PROGRESS NOTES
HISTORY OF PRESENT ILLNESS  Madelin Parehk is a 54 y.o. female     HPI ESTABLISHED patient here for LEFT breast hematoma.  Also, patient feels something sticking at port site.    7/18/23: LEFT breast, Stellate Mass, Stereotatic biopsy: invasive and in situ carcinoma, grade 2. Largest tumor focus measured 4mm. No lymphovascular invasion seen. ER+(98%)/NC+(54%)/HER2-, Ki-67(59%), p53,      3/19/24: LEFT breast lumpectomy and LEFT SLNBx + PAC removal. PATH:   - LEFT axilla, sentinel LN, excision: One lymph out of seven, positive for metastatic carcinoma (1mi/7).   - LEFT breast lumpectomy: IDC, [place] gr 2, 10 mm in greatest dimension. DCIS present, high nuclear grade, cribriform type. Microcalcifications associated with DCIS. Invasive carcinoma is 0.8 mm from closest (posterior) margin. DCIS is 0.3 mm from closest (posterior) margin. pT1b, pN1mi (sn)  - LEFT axilla, lymph node excision: Four lymph nodes identified, negative for metastatic carcinoma (0/4).     Review of Systems      Physical Exam       ASSESSMENT and PLAN  {Assessment and Plan Chronic Disease:[id number]}    
BREAST MASS performed by Ton DILLARD MD at [facility] AMBULATORY OR    KY UNLISTED PROCEDURE ABDOMEN PERITONEUM & OMENTUM  2000    BOWEL OBSTRUCTION    RHINOPLASTY      US BREAST BIOPSY W LOC DEVICE 1ST LESION LEFT Left 08/01/2023    US BREAST LYMPH NODE BIOPSY LEFT    US BREAST BIOPSY W LOC DEVICE 1ST LESION LEFT Left 08/01/2023     BREAST LYMPH NODE BIOPSY LEFT    WISDOM TOOTH EXTRACTION         Social History     Socioeconomic History    Marital status:      Spouse name: Not on file    Number of children: Not on file    Years of education: Not on file    Highest education level: Not on file   Occupational History    Not on file   Tobacco Use    Smoking status: Former     Average packs/day: 0.2 packs/day for 36.2 years (6.7 ttl pk-yrs)     Types: Cigarettes     Start date: 2/17/1988    Smokeless tobacco: Never   Vaping Use    Vaping Use: Never used   Substance and Sexual Activity    Alcohol use: Yes     Alcohol/week: 1.0 standard drink of alcohol     Types: 1 Glasses of wine per week    Drug use: Yes     Comment: cbd gummy    Sexual activity: Yes     Partners: Male   Other Topics Concern    Not on file   Social History Narrative    Not on file     Social Determinants of Health     Financial Resource Strain: Not on file   Food Insecurity: Not on file   Transportation Needs: Not on file   Physical Activity: Not on file   Stress: Not on file   Social Connections: Not on file   Intimate Partner Violence: Not on file   Housing Stability: Not on file       Current Outpatient Medications on File Prior to Visit   Medication Sig Dispense Refill    Biotin 1 MG CAPS Take by mouth      potassium chloride (KLOR-CON M) 20 MEQ extended release tablet Take 1 tablet by mouth every morning      ALPRAZolam (XANAX) 0.5 MG tablet Take 1 tablet by mouth as needed for Sleep.      apixaban (ELIQUIS) 5 MG TABS tablet Take 1 tablet by mouth 2 times daily 60 tablet 5    Docusate Sodium (COLACE PO) Take by mouth      Loperamide

## 2024-04-29 ENCOUNTER — HOSPITAL ENCOUNTER (OUTPATIENT)
Facility: HOSPITAL | Age: 54
Discharge: HOME | End: 2024-05-02
Attending: RADIOLOGY

## 2024-05-06 ENCOUNTER — OFFICE VISIT (OUTPATIENT)
Age: 54
End: 2024-05-06

## 2024-05-06 VITALS
HEART RATE: 84 BPM | SYSTOLIC BLOOD PRESSURE: 117 MMHG | TEMPERATURE: 98 F | DIASTOLIC BLOOD PRESSURE: 80 MMHG | RESPIRATION RATE: 16 BRPM | BODY MASS INDEX: 17.1 KG/M2 | OXYGEN SATURATION: 98 % | WEIGHT: 99.6 LBS

## 2024-05-06 DIAGNOSIS — Z17.0 MALIGNANT NEOPLASM OF CENTRAL PORTION OF LEFT BREAST IN FEMALE, ESTROGEN RECEPTOR POSITIVE (HCC): Primary | ICD-10-CM

## 2024-05-06 DIAGNOSIS — Z78.0 POSTMENOPAUSAL: ICD-10-CM

## 2024-05-06 DIAGNOSIS — C50.112 MALIGNANT NEOPLASM OF CENTRAL PORTION OF LEFT BREAST IN FEMALE, ESTROGEN RECEPTOR POSITIVE (HCC): Primary | ICD-10-CM

## 2024-05-06 RX ORDER — ANASTROZOLE 1 MG/1
1 TABLET ORAL DAILY
Qty: 90 TABLET | Refills: 3 | Status: SHIPPED | OUTPATIENT
Start: 2024-05-06

## 2024-05-06 NOTE — PROGRESS NOTES
Madelin Parekh is a 54 y.o. female here today to follow up for breast cancer.    1. Have you been to the ER, urgent care clinic since your last visit?  Hospitalized since your last visit?no    2. Have you seen or consulted any other health care providers outside of the [facility] since your last visit?  Include any pap smears or colon screening. no        
aromatase inhibitors (anastrozole, letrozole, and exemestane) were discussed in detail and the patient was informed of the following: Risks include the development of painful muscles and joints (arthralgia/myalgia) and bone loss. Muscle and joint pain can be severe but rarely result in any tissue damage; symptoms usually resolve in several weeks when the medication is stopped. Bone loss is common and a bone density test is recommended as a baseline and then yearly to every several years depending on initial results. The risk of fractures is increased by a few percent in patients taking these drugs, but careful monitoring of bone density and using bone protecting agents when indicated can minimize these risks. Unlike tamoxifen there is no increased risk of blood clots or endometrial cancer. AIs can cause or worsen vaginal dryness but women using these drugs should not use vaginal estrogen preparations for these symptoms. AIs can also cause or increase hot flashes. Any other symptoms should be reported. The patient has consented to receiving therapy.      After this discussion, will start anastrozole 1 mg daily PO, one week after she completes XRT, rx in    Dexa ordered    2. Emotional well being/recurrent depression:  She has excellent support and is coping well with her disease; also taking clonazepam.  Gave number for Teto, she has not called him yet.  Currently taking 25 mg of zoloft - previously discussed increasing - she did not. She is utilizing THC gummies with improvement.     3. PALB2 VUS:  not clinically relevant at this time    4. Pulmonary nodule: 3 mm nodule to RUL seen on CT 9/19/23. repeated CT Chest after completion of chemotherapy. Ordered, on 3/15/24.  Unlikely of clinical significance.  No evidence of metastatic disease    5. Monitoring cardiotoxic medication: Baseline TTE 9/6/23 EF 64%. Will repeat every 12 weeks while undergoing HER-2 therapy. TTE 1/2/24 EF 65%    6. Right Subclavian DVT: seen on

## 2024-05-14 ENCOUNTER — HOSPITAL ENCOUNTER (OUTPATIENT)
Facility: HOSPITAL | Age: 54
Discharge: HOME | End: 2024-05-17
Attending: RADIOLOGY

## 2024-05-14 LAB
RAD ONC ARIA COURSE FIRST TREATMENT DATE: NORMAL
RAD ONC ARIA COURSE ID: NORMAL
RAD ONC ARIA COURSE LAST TREATMENT DATE: NORMAL
RAD ONC ARIA COURSE SESSION NUMBER: 1
RAD ONC ARIA COURSE START DATE: NORMAL
RAD ONC ARIA COURSE TREATMENT ELAPSED DAYS: 0
RAD ONC ARIA PLAN FRACTIONS TREATED TO DATE: 1
RAD ONC ARIA PLAN ID: NORMAL
RAD ONC ARIA PLAN PRESCRIBED DOSE PER FRACTION: 1.8 GY
RAD ONC ARIA PLAN PRIMARY REFERENCE POINT: NORMAL
RAD ONC ARIA PLAN TOTAL FRACTIONS PRESCRIBED: 28
RAD ONC ARIA PLAN TOTAL PRESCRIBED DOSE: 5040 CGY
RAD ONC ARIA REFERENCE POINT DOSAGE GIVEN TO DATE: 1.79 GY
RAD ONC ARIA REFERENCE POINT DOSAGE GIVEN TO DATE: 1.8 GY
RAD ONC ARIA REFERENCE POINT ID: NORMAL
RAD ONC ARIA REFERENCE POINT SESSION DOSAGE GIVEN: 1.79 GY
RAD ONC ARIA REFERENCE POINT SESSION DOSAGE GIVEN: 1.8 GY

## 2024-05-15 ENCOUNTER — HOSPITAL ENCOUNTER (OUTPATIENT)
Facility: HOSPITAL | Age: 54
Discharge: HOME | End: 2024-05-18
Attending: RADIOLOGY

## 2024-05-15 LAB
RAD ONC ARIA COURSE FIRST TREATMENT DATE: NORMAL
RAD ONC ARIA COURSE ID: NORMAL
RAD ONC ARIA COURSE LAST TREATMENT DATE: NORMAL
RAD ONC ARIA COURSE SESSION NUMBER: 2
RAD ONC ARIA COURSE START DATE: NORMAL
RAD ONC ARIA COURSE TREATMENT ELAPSED DAYS: 1
RAD ONC ARIA PLAN FRACTIONS TREATED TO DATE: 2
RAD ONC ARIA PLAN ID: NORMAL
RAD ONC ARIA PLAN PRESCRIBED DOSE PER FRACTION: 1.8 GY
RAD ONC ARIA PLAN PRIMARY REFERENCE POINT: NORMAL
RAD ONC ARIA PLAN TOTAL FRACTIONS PRESCRIBED: 28
RAD ONC ARIA PLAN TOTAL PRESCRIBED DOSE: 5040 CGY
RAD ONC ARIA REFERENCE POINT DOSAGE GIVEN TO DATE: 3.59 GY
RAD ONC ARIA REFERENCE POINT DOSAGE GIVEN TO DATE: 3.6 GY
RAD ONC ARIA REFERENCE POINT ID: NORMAL
RAD ONC ARIA REFERENCE POINT SESSION DOSAGE GIVEN: 1.79 GY
RAD ONC ARIA REFERENCE POINT SESSION DOSAGE GIVEN: 1.8 GY

## 2024-05-16 ENCOUNTER — HOSPITAL ENCOUNTER (OUTPATIENT)
Facility: HOSPITAL | Age: 54
Discharge: HOME | End: 2024-05-19
Attending: RADIOLOGY

## 2024-05-16 LAB
RAD ONC ARIA COURSE FIRST TREATMENT DATE: NORMAL
RAD ONC ARIA COURSE ID: NORMAL
RAD ONC ARIA COURSE LAST TREATMENT DATE: NORMAL
RAD ONC ARIA COURSE SESSION NUMBER: 3
RAD ONC ARIA COURSE START DATE: NORMAL
RAD ONC ARIA COURSE TREATMENT ELAPSED DAYS: 2
RAD ONC ARIA PLAN FRACTIONS TREATED TO DATE: 3
RAD ONC ARIA PLAN ID: NORMAL
RAD ONC ARIA PLAN PRESCRIBED DOSE PER FRACTION: 1.8 GY
RAD ONC ARIA PLAN PRIMARY REFERENCE POINT: NORMAL
RAD ONC ARIA PLAN TOTAL FRACTIONS PRESCRIBED: 28
RAD ONC ARIA PLAN TOTAL PRESCRIBED DOSE: 5040 CGY
RAD ONC ARIA REFERENCE POINT DOSAGE GIVEN TO DATE: 5.38 GY
RAD ONC ARIA REFERENCE POINT DOSAGE GIVEN TO DATE: 5.4 GY
RAD ONC ARIA REFERENCE POINT ID: NORMAL
RAD ONC ARIA REFERENCE POINT SESSION DOSAGE GIVEN: 1.79 GY
RAD ONC ARIA REFERENCE POINT SESSION DOSAGE GIVEN: 1.8 GY

## 2024-05-16 ASSESSMENT — PATIENT HEALTH QUESTIONNAIRE - PHQ9
SUM OF ALL RESPONSES TO PHQ9 QUESTIONS 1 & 2: 2
2. FEELING DOWN, DEPRESSED OR HOPELESS: SEVERAL DAYS
SUM OF ALL RESPONSES TO PHQ QUESTIONS 1-9: 2
1. LITTLE INTEREST OR PLEASURE IN DOING THINGS: SEVERAL DAYS
SUM OF ALL RESPONSES TO PHQ QUESTIONS 1-9: 2

## 2024-05-16 ASSESSMENT — PAIN SCALES - GENERAL: PAINLEVEL_OUTOF10: 0

## 2024-05-16 NOTE — PROGRESS NOTES
Cancer [place] at [facility]  [facility]    Radiation Oncology Weekly Progress Note  Encounter Date: 05/16/24     Madelin Parekh  [id number]  1970     Diagnosis   Cancer Staging   Malignant neoplasm of upper-outer quadrant of left breast in female, estrogen receptor positive (HCC)  Staging form: Breast, AJCC 8th Edition  - Pathologic stage from 3/19/2024: No Stage Recommended (ypT1b, pN1mi, cM0, G2, ER+, FL+, HER2-) - Signed by Christopher Camp MD on 4/9/2024  - Clinical: Stage IB (cT1c, cN1, cM0, G2, ER+, FL+, HER2-) - Signed by Christopher Camp MD on 4/9/2024    AJCC Staging has been reviewed.    Interval History   Ms. Parekh is a 54 y.o. female seen today for her weekly on-treatment evaluation    5/16/24: first week, questions answered, discussed skincare.      Treatment Details:     Treatment Site Dose/Fx (cGy) #Fx Current Dose (cGy) Total Planned Dose (cGy) Start Date End Date   Left breast and nodes 180 3/28 540 5040  +1000 5/14/24 05/16/24     Concurrent Therapy: No concurrent systemic therapy  Response to treatment: N/A    Allergies and Medications     Allergies   Allergen Reactions    Morphine Other (See Comments)     PT STATES SHE FELT TRAPPED IN HER OWN BODY       Current Outpatient Medications   Medication Instructions    ALPRAZolam (XANAX) 0.5 mg, Oral, PRN    anastrozole (ARIMIDEX) 1 mg, Oral, DAILY    Biotin 1 MG CAPS Oral    Cholecalciferol 2,000 Units, Oral, EVERY MORNING    clonazePAM (KLONOPIN) 0.5 mg, Oral, 2 TIMES DAILY PRN    Docusate Sodium (COLACE PO) Oral    Loperamide HCl (IMODIUM PO) Oral    potassium chloride (KLOR-CON M) 20 MEQ extended release tablet 20 mEq, Oral, EVERY MORNING    sertraline (ZOLOFT) 25 mg, Oral, EVERY MORNING    zolpidem (AMBIEN) 5 MG tablet Take 1 tablet by mouth nightly as needed.        Physical Exam:   Vitals: There were no vitals taken for this visit. Pain Level: 0  Performance Status: ECOG 0, fully active, able to carry on

## 2024-05-17 ENCOUNTER — HOSPITAL ENCOUNTER (OUTPATIENT)
Facility: HOSPITAL | Age: 54
Discharge: HOME | End: 2024-05-20
Attending: RADIOLOGY

## 2024-05-17 LAB
RAD ONC ARIA COURSE FIRST TREATMENT DATE: NORMAL
RAD ONC ARIA COURSE ID: NORMAL
RAD ONC ARIA COURSE LAST TREATMENT DATE: NORMAL
RAD ONC ARIA COURSE SESSION NUMBER: 4
RAD ONC ARIA COURSE START DATE: NORMAL
RAD ONC ARIA COURSE TREATMENT ELAPSED DAYS: 3
RAD ONC ARIA PLAN FRACTIONS TREATED TO DATE: 4
RAD ONC ARIA PLAN ID: NORMAL
RAD ONC ARIA PLAN PRESCRIBED DOSE PER FRACTION: 1.8 GY
RAD ONC ARIA PLAN PRIMARY REFERENCE POINT: NORMAL
RAD ONC ARIA PLAN TOTAL FRACTIONS PRESCRIBED: 28
RAD ONC ARIA PLAN TOTAL PRESCRIBED DOSE: 5040 CGY
RAD ONC ARIA REFERENCE POINT DOSAGE GIVEN TO DATE: 7.17 GY
RAD ONC ARIA REFERENCE POINT DOSAGE GIVEN TO DATE: 7.2 GY
RAD ONC ARIA REFERENCE POINT ID: NORMAL
RAD ONC ARIA REFERENCE POINT SESSION DOSAGE GIVEN: 1.79 GY
RAD ONC ARIA REFERENCE POINT SESSION DOSAGE GIVEN: 1.8 GY

## 2024-05-20 ENCOUNTER — HOSPITAL ENCOUNTER (OUTPATIENT)
Facility: HOSPITAL | Age: 54
Discharge: HOME | End: 2024-05-23
Attending: RADIOLOGY

## 2024-05-20 LAB
RAD ONC ARIA COURSE FIRST TREATMENT DATE: NORMAL
RAD ONC ARIA COURSE ID: NORMAL
RAD ONC ARIA COURSE LAST TREATMENT DATE: NORMAL
RAD ONC ARIA COURSE SESSION NUMBER: 5
RAD ONC ARIA COURSE START DATE: NORMAL
RAD ONC ARIA COURSE TREATMENT ELAPSED DAYS: 6
RAD ONC ARIA PLAN FRACTIONS TREATED TO DATE: 5
RAD ONC ARIA PLAN ID: NORMAL
RAD ONC ARIA PLAN PRESCRIBED DOSE PER FRACTION: 1.8 GY
RAD ONC ARIA PLAN PRIMARY REFERENCE POINT: NORMAL
RAD ONC ARIA PLAN TOTAL FRACTIONS PRESCRIBED: 28
RAD ONC ARIA PLAN TOTAL PRESCRIBED DOSE: 5040 CGY
RAD ONC ARIA REFERENCE POINT DOSAGE GIVEN TO DATE: 8.97 GY
RAD ONC ARIA REFERENCE POINT DOSAGE GIVEN TO DATE: 9 GY
RAD ONC ARIA REFERENCE POINT ID: NORMAL
RAD ONC ARIA REFERENCE POINT SESSION DOSAGE GIVEN: 1.79 GY
RAD ONC ARIA REFERENCE POINT SESSION DOSAGE GIVEN: 1.8 GY

## 2024-05-21 ENCOUNTER — TELEPHONE (OUTPATIENT)
Age: 54
End: 2024-05-21

## 2024-05-21 ENCOUNTER — HOSPITAL ENCOUNTER (OUTPATIENT)
Facility: HOSPITAL | Age: 54
Discharge: HOME | End: 2024-05-24
Attending: RADIOLOGY

## 2024-05-21 LAB
RAD ONC ARIA COURSE FIRST TREATMENT DATE: NORMAL
RAD ONC ARIA COURSE ID: NORMAL
RAD ONC ARIA COURSE LAST TREATMENT DATE: NORMAL
RAD ONC ARIA COURSE SESSION NUMBER: 6
RAD ONC ARIA COURSE START DATE: NORMAL
RAD ONC ARIA COURSE TREATMENT ELAPSED DAYS: 7
RAD ONC ARIA PLAN FRACTIONS TREATED TO DATE: 6
RAD ONC ARIA PLAN ID: NORMAL
RAD ONC ARIA PLAN PRESCRIBED DOSE PER FRACTION: 1.8 GY
RAD ONC ARIA PLAN PRIMARY REFERENCE POINT: NORMAL
RAD ONC ARIA PLAN TOTAL FRACTIONS PRESCRIBED: 28
RAD ONC ARIA PLAN TOTAL PRESCRIBED DOSE: 5040 CGY
RAD ONC ARIA REFERENCE POINT DOSAGE GIVEN TO DATE: 10.76 GY
RAD ONC ARIA REFERENCE POINT DOSAGE GIVEN TO DATE: 10.8 GY
RAD ONC ARIA REFERENCE POINT ID: NORMAL
RAD ONC ARIA REFERENCE POINT SESSION DOSAGE GIVEN: 1.79 GY
RAD ONC ARIA REFERENCE POINT SESSION DOSAGE GIVEN: 1.8 GY

## 2024-05-21 NOTE — TELEPHONE ENCOUNTER
Patient called and stated that she would like a call back to discuss a test that was talked about at her last visit. Patient stated that she wont be available by phone until after 9:30, and would like to be called after then.       # [phone]

## 2024-05-22 ENCOUNTER — HOSPITAL ENCOUNTER (OUTPATIENT)
Facility: HOSPITAL | Age: 54
Discharge: HOME | End: 2024-05-25
Attending: RADIOLOGY

## 2024-05-22 LAB
RAD ONC ARIA COURSE FIRST TREATMENT DATE: NORMAL
RAD ONC ARIA COURSE ID: NORMAL
RAD ONC ARIA COURSE LAST TREATMENT DATE: NORMAL
RAD ONC ARIA COURSE SESSION NUMBER: 7
RAD ONC ARIA COURSE START DATE: NORMAL
RAD ONC ARIA COURSE TREATMENT ELAPSED DAYS: 8
RAD ONC ARIA PLAN FRACTIONS TREATED TO DATE: 7
RAD ONC ARIA PLAN ID: NORMAL
RAD ONC ARIA PLAN PRESCRIBED DOSE PER FRACTION: 1.8 GY
RAD ONC ARIA PLAN PRIMARY REFERENCE POINT: NORMAL
RAD ONC ARIA PLAN TOTAL FRACTIONS PRESCRIBED: 28
RAD ONC ARIA PLAN TOTAL PRESCRIBED DOSE: 5040 CGY
RAD ONC ARIA REFERENCE POINT DOSAGE GIVEN TO DATE: 12.55 GY
RAD ONC ARIA REFERENCE POINT DOSAGE GIVEN TO DATE: 12.6 GY
RAD ONC ARIA REFERENCE POINT ID: NORMAL
RAD ONC ARIA REFERENCE POINT SESSION DOSAGE GIVEN: 1.79 GY
RAD ONC ARIA REFERENCE POINT SESSION DOSAGE GIVEN: 1.8 GY

## 2024-05-22 NOTE — TELEPHONE ENCOUNTER
[facility] [place] at [facility]  [phone]    05/22/24 9:29 AM EDT -  LVM req call back to discuss

## 2024-05-23 ENCOUNTER — HOSPITAL ENCOUNTER (OUTPATIENT)
Facility: HOSPITAL | Age: 54
Discharge: HOME | End: 2024-05-26
Attending: RADIOLOGY

## 2024-05-23 LAB
RAD ONC ARIA COURSE FIRST TREATMENT DATE: NORMAL
RAD ONC ARIA COURSE ID: NORMAL
RAD ONC ARIA COURSE LAST TREATMENT DATE: NORMAL
RAD ONC ARIA COURSE SESSION NUMBER: 8
RAD ONC ARIA COURSE START DATE: NORMAL
RAD ONC ARIA COURSE TREATMENT ELAPSED DAYS: 9
RAD ONC ARIA PLAN FRACTIONS TREATED TO DATE: 8
RAD ONC ARIA PLAN ID: NORMAL
RAD ONC ARIA PLAN PRESCRIBED DOSE PER FRACTION: 1.8 GY
RAD ONC ARIA PLAN PRIMARY REFERENCE POINT: NORMAL
RAD ONC ARIA PLAN TOTAL FRACTIONS PRESCRIBED: 28
RAD ONC ARIA PLAN TOTAL PRESCRIBED DOSE: 5040 CGY
RAD ONC ARIA REFERENCE POINT DOSAGE GIVEN TO DATE: 14.35 GY
RAD ONC ARIA REFERENCE POINT DOSAGE GIVEN TO DATE: 14.4 GY
RAD ONC ARIA REFERENCE POINT ID: NORMAL
RAD ONC ARIA REFERENCE POINT SESSION DOSAGE GIVEN: 1.79 GY
RAD ONC ARIA REFERENCE POINT SESSION DOSAGE GIVEN: 1.8 GY

## 2024-05-23 ASSESSMENT — PAIN SCALES - GENERAL: PAINLEVEL_OUTOF10: 0

## 2024-05-23 NOTE — PROGRESS NOTES
Cancer [place] at [facility]  [facility]    Radiation Oncology Weekly Progress Note  Encounter Date: 05/23/24     Madelin Parekh  [id number]  1970     Diagnosis    Cancer Staging   Malignant neoplasm of upper-outer quadrant of left breast in female, estrogen receptor positive (HCC)  Staging form: Breast, AJCC 8th Edition  - Pathologic stage from 3/19/2024: No Stage Recommended (ypT1b, pN1mi, cM0, G2, ER+, PA+, HER2-) - Signed by Christopher Camp MD on 4/9/2024  - Clinical: Stage IB (cT1c, cN1, cM0, G2, ER+, PA+, HER2-) - Signed by Christopher Camp MD on 4/9/2024    AJCC Staging has been reviewed.    Interval History   Ms. Parekh is a 54 y.o. female seen today for her weekly on-treatment evaluation    5/16/24: first week, questions answered, discussed skincare.  5/23/24- doing well, no skin changes or complaints.    Treatment Details:     Treatment Site Dose/Fx (cGy) #Fx Current Dose (cGy) Total Planned Dose (cGy) Start Date End Date   Left breast and nodes 180 3/28 540 5040  +1000 5/14/24 05/23/24     Concurrent Therapy: No concurrent systemic therapy  Response to treatment: N/A    Allergies and Medications     Allergies   Allergen Reactions    Morphine Other (See Comments)     PT STATES SHE FELT TRAPPED IN HER OWN BODY       Current Outpatient Medications   Medication Instructions    ALPRAZolam (XANAX) 0.5 mg, Oral, PRN    anastrozole (ARIMIDEX) 1 mg, Oral, DAILY    Biotin 1 MG CAPS Oral    Cholecalciferol 2,000 Units, Oral, EVERY MORNING    clonazePAM (KLONOPIN) 0.5 mg, Oral, 2 TIMES DAILY PRN    Docusate Sodium (COLACE PO) Oral    Loperamide HCl (IMODIUM PO) Oral    potassium chloride (KLOR-CON M) 20 MEQ extended release tablet 20 mEq, Oral, EVERY MORNING    sertraline (ZOLOFT) 25 mg, Oral, EVERY MORNING    zolpidem (AMBIEN) 5 MG tablet Take 1 tablet by mouth nightly as needed.        Physical Exam:   Vitals: There were no vitals taken for this visit. Pain Level:

## 2024-05-23 NOTE — TELEPHONE ENCOUNTER
[facility] [place] at [facility]  [phone]    05/23/24 11:42 AM EDT - LVM req call back to discuss imaging

## 2024-05-24 ENCOUNTER — HOSPITAL ENCOUNTER (OUTPATIENT)
Facility: HOSPITAL | Age: 54
Discharge: HOME | End: 2024-05-27
Attending: RADIOLOGY

## 2024-05-24 LAB
RAD ONC ARIA COURSE FIRST TREATMENT DATE: NORMAL
RAD ONC ARIA COURSE ID: NORMAL
RAD ONC ARIA COURSE LAST TREATMENT DATE: NORMAL
RAD ONC ARIA COURSE SESSION NUMBER: 9
RAD ONC ARIA COURSE START DATE: NORMAL
RAD ONC ARIA COURSE TREATMENT ELAPSED DAYS: 10
RAD ONC ARIA PLAN FRACTIONS TREATED TO DATE: 9
RAD ONC ARIA PLAN ID: NORMAL
RAD ONC ARIA PLAN PRESCRIBED DOSE PER FRACTION: 1.8 GY
RAD ONC ARIA PLAN PRIMARY REFERENCE POINT: NORMAL
RAD ONC ARIA PLAN TOTAL FRACTIONS PRESCRIBED: 28
RAD ONC ARIA PLAN TOTAL PRESCRIBED DOSE: 5040 CGY
RAD ONC ARIA REFERENCE POINT DOSAGE GIVEN TO DATE: 16.14 GY
RAD ONC ARIA REFERENCE POINT DOSAGE GIVEN TO DATE: 16.2 GY
RAD ONC ARIA REFERENCE POINT ID: NORMAL
RAD ONC ARIA REFERENCE POINT SESSION DOSAGE GIVEN: 1.79 GY
RAD ONC ARIA REFERENCE POINT SESSION DOSAGE GIVEN: 1.8 GY

## 2024-05-28 ENCOUNTER — HOSPITAL ENCOUNTER (OUTPATIENT)
Facility: HOSPITAL | Age: 54
Discharge: HOME | End: 2024-05-31
Attending: RADIOLOGY

## 2024-05-28 LAB
RAD ONC ARIA COURSE FIRST TREATMENT DATE: NORMAL
RAD ONC ARIA COURSE ID: NORMAL
RAD ONC ARIA COURSE LAST TREATMENT DATE: NORMAL
RAD ONC ARIA COURSE SESSION NUMBER: 10
RAD ONC ARIA COURSE START DATE: NORMAL
RAD ONC ARIA COURSE TREATMENT ELAPSED DAYS: 14
RAD ONC ARIA PLAN FRACTIONS TREATED TO DATE: 10
RAD ONC ARIA PLAN ID: NORMAL
RAD ONC ARIA PLAN PRESCRIBED DOSE PER FRACTION: 1.8 GY
RAD ONC ARIA PLAN PRIMARY REFERENCE POINT: NORMAL
RAD ONC ARIA PLAN TOTAL FRACTIONS PRESCRIBED: 28
RAD ONC ARIA PLAN TOTAL PRESCRIBED DOSE: 5040 CGY
RAD ONC ARIA REFERENCE POINT DOSAGE GIVEN TO DATE: 17.93 GY
RAD ONC ARIA REFERENCE POINT DOSAGE GIVEN TO DATE: 18 GY
RAD ONC ARIA REFERENCE POINT ID: NORMAL
RAD ONC ARIA REFERENCE POINT SESSION DOSAGE GIVEN: 1.79 GY
RAD ONC ARIA REFERENCE POINT SESSION DOSAGE GIVEN: 1.8 GY

## 2024-05-29 ENCOUNTER — HOSPITAL ENCOUNTER (OUTPATIENT)
Facility: HOSPITAL | Age: 54
Discharge: HOME | End: 2024-06-01
Attending: RADIOLOGY

## 2024-05-29 LAB
RAD ONC ARIA COURSE FIRST TREATMENT DATE: NORMAL
RAD ONC ARIA COURSE ID: NORMAL
RAD ONC ARIA COURSE LAST TREATMENT DATE: NORMAL
RAD ONC ARIA COURSE SESSION NUMBER: 11
RAD ONC ARIA COURSE START DATE: NORMAL
RAD ONC ARIA COURSE TREATMENT ELAPSED DAYS: 15
RAD ONC ARIA PLAN FRACTIONS TREATED TO DATE: 11
RAD ONC ARIA PLAN ID: NORMAL
RAD ONC ARIA PLAN PRESCRIBED DOSE PER FRACTION: 1.8 GY
RAD ONC ARIA PLAN PRIMARY REFERENCE POINT: NORMAL
RAD ONC ARIA PLAN TOTAL FRACTIONS PRESCRIBED: 28
RAD ONC ARIA PLAN TOTAL PRESCRIBED DOSE: 5040 CGY
RAD ONC ARIA REFERENCE POINT DOSAGE GIVEN TO DATE: 19.73 GY
RAD ONC ARIA REFERENCE POINT DOSAGE GIVEN TO DATE: 19.8 GY
RAD ONC ARIA REFERENCE POINT ID: NORMAL
RAD ONC ARIA REFERENCE POINT SESSION DOSAGE GIVEN: 1.79 GY
RAD ONC ARIA REFERENCE POINT SESSION DOSAGE GIVEN: 1.8 GY

## 2024-05-30 ENCOUNTER — HOSPITAL ENCOUNTER (OUTPATIENT)
Facility: HOSPITAL | Age: 54
Discharge: HOME | End: 2024-06-02
Attending: RADIOLOGY

## 2024-05-30 LAB
RAD ONC ARIA COURSE FIRST TREATMENT DATE: NORMAL
RAD ONC ARIA COURSE ID: NORMAL
RAD ONC ARIA COURSE LAST TREATMENT DATE: NORMAL
RAD ONC ARIA COURSE SESSION NUMBER: 12
RAD ONC ARIA COURSE START DATE: NORMAL
RAD ONC ARIA COURSE TREATMENT ELAPSED DAYS: 16
RAD ONC ARIA PLAN FRACTIONS TREATED TO DATE: 12
RAD ONC ARIA PLAN ID: NORMAL
RAD ONC ARIA PLAN PRESCRIBED DOSE PER FRACTION: 1.8 GY
RAD ONC ARIA PLAN PRIMARY REFERENCE POINT: NORMAL
RAD ONC ARIA PLAN TOTAL FRACTIONS PRESCRIBED: 28
RAD ONC ARIA PLAN TOTAL PRESCRIBED DOSE: 5040 CGY
RAD ONC ARIA REFERENCE POINT DOSAGE GIVEN TO DATE: 21.52 GY
RAD ONC ARIA REFERENCE POINT DOSAGE GIVEN TO DATE: 21.6 GY
RAD ONC ARIA REFERENCE POINT ID: NORMAL
RAD ONC ARIA REFERENCE POINT SESSION DOSAGE GIVEN: 1.79 GY
RAD ONC ARIA REFERENCE POINT SESSION DOSAGE GIVEN: 1.8 GY

## 2024-05-30 ASSESSMENT — PAIN SCALES - GENERAL: PAINLEVEL_OUTOF10: 0

## 2024-05-30 NOTE — PROGRESS NOTES
Cancer [place] at [facility]  [facility]    Radiation Oncology Weekly Progress Note  Encounter Date: 05/30/24     Madelin Parekh  [id number]  1970     Diagnosis    Cancer Staging   Malignant neoplasm of upper-outer quadrant of left breast in female, estrogen receptor positive (HCC)  Staging form: Breast, AJCC 8th Edition  - Pathologic stage from 3/19/2024: No Stage Recommended (ypT1b, pN1mi, cM0, G2, ER+, MI+, HER2-) - Signed by Christopher Camp MD on 4/9/2024  - Clinical: Stage IB (cT1c, cN1, cM0, G2, ER+, MI+, HER2-) - Signed by Christopher Camp MD on 4/9/2024    AJCC Staging has been reviewed.    Interval History   Ms. Parekh is a 54 y.o. female seen today for her weekly on-treatment evaluation    5/16/24: first week, questions answered, discussed skincare.  5/23/24- doing well, no skin changes or complaints.  5/30/24- doing well, anxious at baseline, no significant skin changes or complaints.    Treatment Details:     Treatment Site Dose/Fx (cGy) #Fx Current Dose (cGy) Total Planned Dose (cGy) Start Date End Date   Left breast and nodes 180 12/28 2160 5040  +1000 5/14/24 05/30/24     Concurrent Therapy: No concurrent systemic therapy  Response to treatment: N/A    Allergies and Medications     Allergies   Allergen Reactions    Morphine Other (See Comments)     PT STATES SHE FELT TRAPPED IN HER OWN BODY       Current Outpatient Medications   Medication Instructions    ALPRAZolam (XANAX) 0.5 mg, Oral, PRN    anastrozole (ARIMIDEX) 1 mg, Oral, DAILY    Biotin 1 MG CAPS Oral    Cholecalciferol 2,000 Units, Oral, EVERY MORNING    clonazePAM (KLONOPIN) 0.5 mg, Oral, 2 TIMES DAILY PRN    Docusate Sodium (COLACE PO) Oral    Loperamide HCl (IMODIUM PO) Oral    potassium chloride (KLOR-CON M) 20 MEQ extended release tablet 20 mEq, Oral, EVERY MORNING    sertraline (ZOLOFT) 25 mg, Oral, EVERY MORNING    zolpidem (AMBIEN) 5 MG tablet Take 1 tablet by mouth nightly as needed.

## 2024-05-31 ENCOUNTER — HOSPITAL ENCOUNTER (OUTPATIENT)
Facility: HOSPITAL | Age: 54
Discharge: HOME | End: 2024-06-03
Attending: RADIOLOGY

## 2024-05-31 LAB
RAD ONC ARIA COURSE FIRST TREATMENT DATE: NORMAL
RAD ONC ARIA COURSE ID: NORMAL
RAD ONC ARIA COURSE LAST TREATMENT DATE: NORMAL
RAD ONC ARIA COURSE SESSION NUMBER: 13
RAD ONC ARIA COURSE START DATE: NORMAL
RAD ONC ARIA COURSE TREATMENT ELAPSED DAYS: 17
RAD ONC ARIA PLAN FRACTIONS TREATED TO DATE: 13
RAD ONC ARIA PLAN ID: NORMAL
RAD ONC ARIA PLAN PRESCRIBED DOSE PER FRACTION: 1.8 GY
RAD ONC ARIA PLAN PRIMARY REFERENCE POINT: NORMAL
RAD ONC ARIA PLAN TOTAL FRACTIONS PRESCRIBED: 28
RAD ONC ARIA PLAN TOTAL PRESCRIBED DOSE: 5040 CGY
RAD ONC ARIA REFERENCE POINT DOSAGE GIVEN TO DATE: 23.31 GY
RAD ONC ARIA REFERENCE POINT DOSAGE GIVEN TO DATE: 23.4 GY
RAD ONC ARIA REFERENCE POINT ID: NORMAL
RAD ONC ARIA REFERENCE POINT SESSION DOSAGE GIVEN: 1.79 GY
RAD ONC ARIA REFERENCE POINT SESSION DOSAGE GIVEN: 1.8 GY

## 2024-06-03 ENCOUNTER — HOSPITAL ENCOUNTER (OUTPATIENT)
Facility: HOSPITAL | Age: 54
Discharge: HOME | End: 2024-06-06
Attending: RADIOLOGY

## 2024-06-03 LAB
RAD ONC ARIA COURSE FIRST TREATMENT DATE: NORMAL
RAD ONC ARIA COURSE ID: NORMAL
RAD ONC ARIA COURSE LAST TREATMENT DATE: NORMAL
RAD ONC ARIA COURSE SESSION NUMBER: 14
RAD ONC ARIA COURSE START DATE: NORMAL
RAD ONC ARIA COURSE TREATMENT ELAPSED DAYS: 20
RAD ONC ARIA PLAN FRACTIONS TREATED TO DATE: 14
RAD ONC ARIA PLAN ID: NORMAL
RAD ONC ARIA PLAN PRESCRIBED DOSE PER FRACTION: 1.8 GY
RAD ONC ARIA PLAN PRIMARY REFERENCE POINT: NORMAL
RAD ONC ARIA PLAN TOTAL FRACTIONS PRESCRIBED: 28
RAD ONC ARIA PLAN TOTAL PRESCRIBED DOSE: 5040 CGY
RAD ONC ARIA REFERENCE POINT DOSAGE GIVEN TO DATE: 25.11 GY
RAD ONC ARIA REFERENCE POINT DOSAGE GIVEN TO DATE: 25.2 GY
RAD ONC ARIA REFERENCE POINT ID: NORMAL
RAD ONC ARIA REFERENCE POINT SESSION DOSAGE GIVEN: 1.79 GY
RAD ONC ARIA REFERENCE POINT SESSION DOSAGE GIVEN: 1.8 GY

## 2024-06-04 ENCOUNTER — HOSPITAL ENCOUNTER (OUTPATIENT)
Facility: HOSPITAL | Age: 54
Discharge: HOME | End: 2024-06-07
Attending: RADIOLOGY

## 2024-06-04 ENCOUNTER — CLINICAL DOCUMENTATION (OUTPATIENT)
Age: 54
End: 2024-06-04

## 2024-06-04 DIAGNOSIS — Z85.3 HISTORY OF BREAST CANCER: Primary | ICD-10-CM

## 2024-06-04 LAB
RAD ONC ARIA COURSE FIRST TREATMENT DATE: NORMAL
RAD ONC ARIA COURSE ID: NORMAL
RAD ONC ARIA COURSE LAST TREATMENT DATE: NORMAL
RAD ONC ARIA COURSE SESSION NUMBER: 15
RAD ONC ARIA COURSE START DATE: NORMAL
RAD ONC ARIA COURSE TREATMENT ELAPSED DAYS: 21
RAD ONC ARIA PLAN FRACTIONS TREATED TO DATE: 15
RAD ONC ARIA PLAN ID: NORMAL
RAD ONC ARIA PLAN PRESCRIBED DOSE PER FRACTION: 1.8 GY
RAD ONC ARIA PLAN PRIMARY REFERENCE POINT: NORMAL
RAD ONC ARIA PLAN TOTAL FRACTIONS PRESCRIBED: 28
RAD ONC ARIA PLAN TOTAL PRESCRIBED DOSE: 5040 CGY
RAD ONC ARIA REFERENCE POINT DOSAGE GIVEN TO DATE: 26.9 GY
RAD ONC ARIA REFERENCE POINT DOSAGE GIVEN TO DATE: 27 GY
RAD ONC ARIA REFERENCE POINT ID: NORMAL
RAD ONC ARIA REFERENCE POINT SESSION DOSAGE GIVEN: 1.79 GY
RAD ONC ARIA REFERENCE POINT SESSION DOSAGE GIVEN: 1.8 GY

## 2024-06-04 NOTE — PROGRESS NOTES
The patient called and is concerned with her mammogram schedule. She is currently receiving radiation and completes XRT the end of June. The patient is concerned with her unaffected breast not having been screened. I spoke with dr. Harris regarding this and she suggests having the BILATERAL diagnostic done in 10/2024. The patient will then come in for a follow up with Dr. Forrest. Since her first breast cancer was not seen on her mammogram she will discuss having MRI's with Dr. Forrest. The patient was appreciative for the discussion.

## 2024-06-05 ENCOUNTER — HOSPITAL ENCOUNTER (OUTPATIENT)
Facility: HOSPITAL | Age: 54
Discharge: HOME | End: 2024-06-08
Attending: RADIOLOGY

## 2024-06-05 LAB
RAD ONC ARIA COURSE FIRST TREATMENT DATE: NORMAL
RAD ONC ARIA COURSE ID: NORMAL
RAD ONC ARIA COURSE LAST TREATMENT DATE: NORMAL
RAD ONC ARIA COURSE SESSION NUMBER: 16
RAD ONC ARIA COURSE START DATE: NORMAL
RAD ONC ARIA COURSE TREATMENT ELAPSED DAYS: 22
RAD ONC ARIA PLAN FRACTIONS TREATED TO DATE: 16
RAD ONC ARIA PLAN ID: NORMAL
RAD ONC ARIA PLAN PRESCRIBED DOSE PER FRACTION: 1.8 GY
RAD ONC ARIA PLAN PRIMARY REFERENCE POINT: NORMAL
RAD ONC ARIA PLAN TOTAL FRACTIONS PRESCRIBED: 28
RAD ONC ARIA PLAN TOTAL PRESCRIBED DOSE: 5040 CGY
RAD ONC ARIA REFERENCE POINT DOSAGE GIVEN TO DATE: 28.69 GY
RAD ONC ARIA REFERENCE POINT DOSAGE GIVEN TO DATE: 28.8 GY
RAD ONC ARIA REFERENCE POINT ID: NORMAL
RAD ONC ARIA REFERENCE POINT SESSION DOSAGE GIVEN: 1.79 GY
RAD ONC ARIA REFERENCE POINT SESSION DOSAGE GIVEN: 1.8 GY

## 2024-06-05 ASSESSMENT — PAIN SCALES - GENERAL: PAINLEVEL_OUTOF10: 0

## 2024-06-05 NOTE — PROGRESS NOTES
Cancer [place] at [facility]  [facility]    Radiation Oncology Weekly Progress Note  Encounter Date: 06/05/24     Madelin Parekh  [id number]  1970     Diagnosis    Cancer Staging   Malignant neoplasm of upper-outer quadrant of left breast in female, estrogen receptor positive (HCC)  Staging form: Breast, AJCC 8th Edition  - Pathologic stage from 3/19/2024: No Stage Recommended (ypT1b, pN1mi, cM0, G2, ER+, VT+, HER2-) - Signed by Christopher Camp MD on 4/9/2024  - Clinical: Stage IB (cT1c, cN1, cM0, G2, ER+, VT+, HER2-) - Signed by Christopher Camp MD on 4/9/2024    AJCC Staging has been reviewed.    Interval History   Ms. Parekh is a 54 y.o. female seen today for her weekly on-treatment evaluation    5/16/24: first week, questions answered, discussed skincare.  5/23/24- doing well, no skin changes or complaints.  5/30/24- doing well, anxious at baseline, no significant skin changes or complaints.  6/5/24- doing well but baseline anxiety. Takes 25 Zoloft daily, has Xanax and Clonazepam for sleep and anxiety. Will speak with PCP about if she should adjust meds right now since she is more anxious. Has bone density tomorrow and she is very nervous this will show cancer. Discussed why Dr. Matt ordered this test and what we are looking for with it. She understands but is still very anxious. No radiation side effects at this time.    Treatment Details:     Treatment Site Dose/Fx (cGy) #Fx Current Dose (cGy) Total Planned Dose (cGy) Start Date End Date   Left breast and nodes 180 16/28 2880 5040  +1000 5/14/24 06/05/24     Concurrent Therapy: No concurrent systemic therapy  Response to treatment: N/A    Allergies and Medications     Allergies   Allergen Reactions    Morphine Other (See Comments)     PT STATES SHE FELT TRAPPED IN HER OWN BODY       Current Outpatient Medications   Medication Instructions    ALPRAZolam (XANAX) 0.5 mg, Oral, PRN    anastrozole (ARIMIDEX) 1 mg, Oral, DAILY

## 2024-06-06 ENCOUNTER — HOSPITAL ENCOUNTER (OUTPATIENT)
Facility: HOSPITAL | Age: 54
Discharge: HOME | End: 2024-06-09
Attending: RADIOLOGY

## 2024-06-06 LAB
RAD ONC ARIA COURSE FIRST TREATMENT DATE: NORMAL
RAD ONC ARIA COURSE ID: NORMAL
RAD ONC ARIA COURSE LAST TREATMENT DATE: NORMAL
RAD ONC ARIA COURSE SESSION NUMBER: 17
RAD ONC ARIA COURSE START DATE: NORMAL
RAD ONC ARIA COURSE TREATMENT ELAPSED DAYS: 23
RAD ONC ARIA PLAN FRACTIONS TREATED TO DATE: 17
RAD ONC ARIA PLAN ID: NORMAL
RAD ONC ARIA PLAN PRESCRIBED DOSE PER FRACTION: 1.8 GY
RAD ONC ARIA PLAN PRIMARY REFERENCE POINT: NORMAL
RAD ONC ARIA PLAN TOTAL FRACTIONS PRESCRIBED: 28
RAD ONC ARIA PLAN TOTAL PRESCRIBED DOSE: 5040 CGY
RAD ONC ARIA REFERENCE POINT DOSAGE GIVEN TO DATE: 30.48 GY
RAD ONC ARIA REFERENCE POINT DOSAGE GIVEN TO DATE: 30.6 GY
RAD ONC ARIA REFERENCE POINT ID: NORMAL
RAD ONC ARIA REFERENCE POINT SESSION DOSAGE GIVEN: 1.79 GY
RAD ONC ARIA REFERENCE POINT SESSION DOSAGE GIVEN: 1.8 GY

## 2024-06-07 ENCOUNTER — HOSPITAL ENCOUNTER (OUTPATIENT)
Facility: HOSPITAL | Age: 54
Discharge: HOME | End: 2024-06-10
Attending: RADIOLOGY

## 2024-06-07 LAB
RAD ONC ARIA COURSE FIRST TREATMENT DATE: NORMAL
RAD ONC ARIA COURSE ID: NORMAL
RAD ONC ARIA COURSE LAST TREATMENT DATE: NORMAL
RAD ONC ARIA COURSE SESSION NUMBER: 18
RAD ONC ARIA COURSE START DATE: NORMAL
RAD ONC ARIA COURSE TREATMENT ELAPSED DAYS: 24
RAD ONC ARIA PLAN FRACTIONS TREATED TO DATE: 18
RAD ONC ARIA PLAN ID: NORMAL
RAD ONC ARIA PLAN PRESCRIBED DOSE PER FRACTION: 1.8 GY
RAD ONC ARIA PLAN PRIMARY REFERENCE POINT: NORMAL
RAD ONC ARIA PLAN TOTAL FRACTIONS PRESCRIBED: 28
RAD ONC ARIA PLAN TOTAL PRESCRIBED DOSE: 5040 CGY
RAD ONC ARIA REFERENCE POINT DOSAGE GIVEN TO DATE: 32.28 GY
RAD ONC ARIA REFERENCE POINT DOSAGE GIVEN TO DATE: 32.4 GY
RAD ONC ARIA REFERENCE POINT ID: NORMAL
RAD ONC ARIA REFERENCE POINT SESSION DOSAGE GIVEN: 1.79 GY
RAD ONC ARIA REFERENCE POINT SESSION DOSAGE GIVEN: 1.8 GY

## 2024-06-10 ENCOUNTER — HOSPITAL ENCOUNTER (OUTPATIENT)
Facility: HOSPITAL | Age: 54
Discharge: HOME | End: 2024-06-13
Attending: RADIOLOGY

## 2024-06-10 ENCOUNTER — TELEPHONE (OUTPATIENT)
Age: 54
End: 2024-06-10

## 2024-06-10 LAB
RAD ONC ARIA COURSE FIRST TREATMENT DATE: NORMAL
RAD ONC ARIA COURSE ID: NORMAL
RAD ONC ARIA COURSE LAST TREATMENT DATE: NORMAL
RAD ONC ARIA COURSE SESSION NUMBER: 19
RAD ONC ARIA COURSE START DATE: NORMAL
RAD ONC ARIA COURSE TREATMENT ELAPSED DAYS: 27
RAD ONC ARIA PLAN FRACTIONS TREATED TO DATE: 19
RAD ONC ARIA PLAN ID: NORMAL
RAD ONC ARIA PLAN PRESCRIBED DOSE PER FRACTION: 1.8 GY
RAD ONC ARIA PLAN PRIMARY REFERENCE POINT: NORMAL
RAD ONC ARIA PLAN TOTAL FRACTIONS PRESCRIBED: 28
RAD ONC ARIA PLAN TOTAL PRESCRIBED DOSE: 5040 CGY
RAD ONC ARIA REFERENCE POINT DOSAGE GIVEN TO DATE: 34.07 GY
RAD ONC ARIA REFERENCE POINT DOSAGE GIVEN TO DATE: 34.2 GY
RAD ONC ARIA REFERENCE POINT ID: NORMAL
RAD ONC ARIA REFERENCE POINT SESSION DOSAGE GIVEN: 1.79 GY
RAD ONC ARIA REFERENCE POINT SESSION DOSAGE GIVEN: 1.8 GY

## 2024-06-10 NOTE — TELEPHONE ENCOUNTER
[facility] [place] at [facility]  [phone]    06/10/24 4:35 PM EDT - Called patient back. She wanted to know exactly what a bone density scan looked at. We reviewed what it was and why we wanted her to do it. Answered all of the patient's questions. No further questions at this time.

## 2024-06-10 NOTE — TELEPHONE ENCOUNTER
Patient called and stated that she would like to discuss her bone density scan that is scheduled for tomorrow. Requested a call back.        # [phone]

## 2024-06-11 ENCOUNTER — HOSPITAL ENCOUNTER (OUTPATIENT)
Facility: HOSPITAL | Age: 54
Discharge: HOME | End: 2024-06-14
Attending: RADIOLOGY

## 2024-06-11 ENCOUNTER — HOSPITAL ENCOUNTER (OUTPATIENT)
Facility: HOSPITAL | Age: 54
Discharge: HOME | End: 2024-06-14
Attending: INTERNAL MEDICINE
Payer: COMMERCIAL

## 2024-06-11 VITALS — HEIGHT: 64 IN | BODY MASS INDEX: 17.42 KG/M2 | WEIGHT: 102 LBS

## 2024-06-11 DIAGNOSIS — Z85.3 HISTORY OF BREAST CANCER: Primary | ICD-10-CM

## 2024-06-11 DIAGNOSIS — Z78.0 POSTMENOPAUSAL: ICD-10-CM

## 2024-06-11 LAB
RAD ONC ARIA COURSE FIRST TREATMENT DATE: NORMAL
RAD ONC ARIA COURSE ID: NORMAL
RAD ONC ARIA COURSE LAST TREATMENT DATE: NORMAL
RAD ONC ARIA COURSE SESSION NUMBER: 20
RAD ONC ARIA COURSE START DATE: NORMAL
RAD ONC ARIA COURSE TREATMENT ELAPSED DAYS: 28
RAD ONC ARIA PLAN FRACTIONS TREATED TO DATE: 20
RAD ONC ARIA PLAN ID: NORMAL
RAD ONC ARIA PLAN PRESCRIBED DOSE PER FRACTION: 1.8 GY
RAD ONC ARIA PLAN PRIMARY REFERENCE POINT: NORMAL
RAD ONC ARIA PLAN TOTAL FRACTIONS PRESCRIBED: 28
RAD ONC ARIA PLAN TOTAL PRESCRIBED DOSE: 5040 CGY
RAD ONC ARIA REFERENCE POINT DOSAGE GIVEN TO DATE: 35.86 GY
RAD ONC ARIA REFERENCE POINT DOSAGE GIVEN TO DATE: 36 GY
RAD ONC ARIA REFERENCE POINT ID: NORMAL
RAD ONC ARIA REFERENCE POINT SESSION DOSAGE GIVEN: 1.79 GY
RAD ONC ARIA REFERENCE POINT SESSION DOSAGE GIVEN: 1.8 GY

## 2024-06-11 PROCEDURE — 77080 DXA BONE DENSITY AXIAL: CPT

## 2024-06-12 ENCOUNTER — HOSPITAL ENCOUNTER (OUTPATIENT)
Facility: HOSPITAL | Age: 54
Discharge: HOME | End: 2024-06-15
Attending: RADIOLOGY

## 2024-06-12 LAB
RAD ONC ARIA COURSE FIRST TREATMENT DATE: NORMAL
RAD ONC ARIA COURSE ID: NORMAL
RAD ONC ARIA COURSE LAST TREATMENT DATE: NORMAL
RAD ONC ARIA COURSE SESSION NUMBER: 21
RAD ONC ARIA COURSE START DATE: NORMAL
RAD ONC ARIA COURSE TREATMENT ELAPSED DAYS: 29
RAD ONC ARIA PLAN FRACTIONS TREATED TO DATE: 21
RAD ONC ARIA PLAN ID: NORMAL
RAD ONC ARIA PLAN PRESCRIBED DOSE PER FRACTION: 1.8 GY
RAD ONC ARIA PLAN PRIMARY REFERENCE POINT: NORMAL
RAD ONC ARIA PLAN TOTAL FRACTIONS PRESCRIBED: 28
RAD ONC ARIA PLAN TOTAL PRESCRIBED DOSE: 5040 CGY
RAD ONC ARIA REFERENCE POINT DOSAGE GIVEN TO DATE: 37.66 GY
RAD ONC ARIA REFERENCE POINT DOSAGE GIVEN TO DATE: 37.8 GY
RAD ONC ARIA REFERENCE POINT ID: NORMAL
RAD ONC ARIA REFERENCE POINT SESSION DOSAGE GIVEN: 1.8 GY

## 2024-06-13 ENCOUNTER — HOSPITAL ENCOUNTER (OUTPATIENT)
Facility: HOSPITAL | Age: 54
Discharge: HOME | End: 2024-06-16
Attending: RADIOLOGY

## 2024-06-13 DIAGNOSIS — C50.412 MALIGNANT NEOPLASM OF UPPER-OUTER QUADRANT OF LEFT BREAST IN FEMALE, ESTROGEN RECEPTOR POSITIVE (HCC): Primary | ICD-10-CM

## 2024-06-13 DIAGNOSIS — Z17.0 MALIGNANT NEOPLASM OF UPPER-OUTER QUADRANT OF LEFT BREAST IN FEMALE, ESTROGEN RECEPTOR POSITIVE (HCC): Primary | ICD-10-CM

## 2024-06-13 LAB
RAD ONC ARIA COURSE FIRST TREATMENT DATE: NORMAL
RAD ONC ARIA COURSE ID: NORMAL
RAD ONC ARIA COURSE LAST TREATMENT DATE: NORMAL
RAD ONC ARIA COURSE SESSION NUMBER: 22
RAD ONC ARIA COURSE START DATE: NORMAL
RAD ONC ARIA COURSE TREATMENT ELAPSED DAYS: 30
RAD ONC ARIA PLAN FRACTIONS TREATED TO DATE: 22
RAD ONC ARIA PLAN ID: NORMAL
RAD ONC ARIA PLAN PRESCRIBED DOSE PER FRACTION: 1.8 GY
RAD ONC ARIA PLAN PRIMARY REFERENCE POINT: NORMAL
RAD ONC ARIA PLAN TOTAL FRACTIONS PRESCRIBED: 28
RAD ONC ARIA PLAN TOTAL PRESCRIBED DOSE: 5040 CGY
RAD ONC ARIA REFERENCE POINT DOSAGE GIVEN TO DATE: 39.45 GY
RAD ONC ARIA REFERENCE POINT DOSAGE GIVEN TO DATE: 39.6 GY
RAD ONC ARIA REFERENCE POINT ID: NORMAL
RAD ONC ARIA REFERENCE POINT SESSION DOSAGE GIVEN: 1.79 GY
RAD ONC ARIA REFERENCE POINT SESSION DOSAGE GIVEN: 1.8 GY
RAD ONC ARIA REFERENCE POINT SESSION DOSAGE GIVEN: 1.8 GY

## 2024-06-13 ASSESSMENT — PAIN SCALES - GENERAL: PAINLEVEL_OUTOF10: 0

## 2024-06-13 NOTE — PROGRESS NOTES
Cancer [place] at [facility]  [facility]    Radiation Oncology Weekly Progress Note  Encounter Date: 06/13/24     Madelin Parekh  [id number]  1970     Diagnosis    Cancer Staging   Malignant neoplasm of upper-outer quadrant of left breast in female, estrogen receptor positive (HCC)  Staging form: Breast, AJCC 8th Edition  - Pathologic stage from 3/19/2024: No Stage Recommended (ypT1b, pN1mi, cM0, G2, ER+, WI+, HER2-) - Signed by Christopher Camp MD on 4/9/2024  - Clinical: Stage IB (cT1c, cN1, cM0, G2, ER+, WI+, HER2-) - Signed by Christopher Camp MD on 4/9/2024    AJCC Staging has been reviewed.    Interval History   Ms. Parekh is a 54 y.o. female seen today for her weekly on-treatment evaluation    5/16/24: first week, questions answered, discussed skincare.  5/23/24- doing well, no skin changes or complaints.  5/30/24- doing well, anxious at baseline, no significant skin changes or complaints.  6/5/24- doing well but baseline anxiety. Takes 25 Zoloft daily, has Xanax and Clonazepam for sleep and anxiety. Will speak with PCP about if she should adjust meds right now since she is more anxious. Has bone density tomorrow and she is very nervous this will show cancer. Discussed why Dr. Matt ordered this test and what we are looking for with it. She understands but is still very anxious. No radiation side effects at this time.  6/13/24: Doing well today.  Has good energy but waking up in the middle of the night as per her baseline.  Using skin cream daily.  All questions answered. Minimal erythema in field, skin and scars intact.     Treatment Details:     Treatment Site Dose/Fx (cGy) #Fx Current Dose (cGy) Total Planned Dose (cGy) Start Date End Date   Left breast and nodes 180 22/28 3960 5040  +1000 5/14/24 06/13/24     Concurrent Therapy: No concurrent systemic therapy  Response to treatment: N/A    Allergies and Medications     Allergies   Allergen Reactions    Morphine Other

## 2024-06-14 ENCOUNTER — HOSPITAL ENCOUNTER (OUTPATIENT)
Facility: HOSPITAL | Age: 54
Discharge: HOME | End: 2024-06-17
Attending: RADIOLOGY

## 2024-06-14 LAB
RAD ONC ARIA COURSE FIRST TREATMENT DATE: NORMAL
RAD ONC ARIA COURSE ID: NORMAL
RAD ONC ARIA COURSE LAST TREATMENT DATE: NORMAL
RAD ONC ARIA COURSE SESSION NUMBER: 23
RAD ONC ARIA COURSE START DATE: NORMAL
RAD ONC ARIA COURSE TREATMENT ELAPSED DAYS: 31
RAD ONC ARIA PLAN FRACTIONS TREATED TO DATE: 23
RAD ONC ARIA PLAN ID: NORMAL
RAD ONC ARIA PLAN PRESCRIBED DOSE PER FRACTION: 1.8 GY
RAD ONC ARIA PLAN PRIMARY REFERENCE POINT: NORMAL
RAD ONC ARIA PLAN TOTAL FRACTIONS PRESCRIBED: 28
RAD ONC ARIA PLAN TOTAL PRESCRIBED DOSE: 5040 CGY
RAD ONC ARIA REFERENCE POINT DOSAGE GIVEN TO DATE: 41.24 GY
RAD ONC ARIA REFERENCE POINT DOSAGE GIVEN TO DATE: 41.4 GY
RAD ONC ARIA REFERENCE POINT ID: NORMAL
RAD ONC ARIA REFERENCE POINT SESSION DOSAGE GIVEN: 1.79 GY
RAD ONC ARIA REFERENCE POINT SESSION DOSAGE GIVEN: 1.8 GY

## 2024-06-17 ENCOUNTER — HOSPITAL ENCOUNTER (OUTPATIENT)
Facility: HOSPITAL | Age: 54
Discharge: HOME | End: 2024-06-20
Attending: RADIOLOGY

## 2024-06-17 LAB
RAD ONC ARIA COURSE FIRST TREATMENT DATE: NORMAL
RAD ONC ARIA COURSE ID: NORMAL
RAD ONC ARIA COURSE LAST TREATMENT DATE: NORMAL
RAD ONC ARIA COURSE SESSION NUMBER: 24
RAD ONC ARIA COURSE START DATE: NORMAL
RAD ONC ARIA COURSE TREATMENT ELAPSED DAYS: 34
RAD ONC ARIA PLAN FRACTIONS TREATED TO DATE: 24
RAD ONC ARIA PLAN ID: NORMAL
RAD ONC ARIA PLAN PRESCRIBED DOSE PER FRACTION: 1.8 GY
RAD ONC ARIA PLAN PRIMARY REFERENCE POINT: NORMAL
RAD ONC ARIA PLAN TOTAL FRACTIONS PRESCRIBED: 28
RAD ONC ARIA PLAN TOTAL PRESCRIBED DOSE: 5040 CGY
RAD ONC ARIA REFERENCE POINT DOSAGE GIVEN TO DATE: 43.04 GY
RAD ONC ARIA REFERENCE POINT DOSAGE GIVEN TO DATE: 43.2 GY
RAD ONC ARIA REFERENCE POINT ID: NORMAL
RAD ONC ARIA REFERENCE POINT SESSION DOSAGE GIVEN: 1.79 GY
RAD ONC ARIA REFERENCE POINT SESSION DOSAGE GIVEN: 1.8 GY

## 2024-06-18 ENCOUNTER — HOSPITAL ENCOUNTER (OUTPATIENT)
Facility: HOSPITAL | Age: 54
Discharge: HOME | End: 2024-06-21
Attending: RADIOLOGY

## 2024-06-18 LAB
RAD ONC ARIA COURSE FIRST TREATMENT DATE: NORMAL
RAD ONC ARIA COURSE ID: NORMAL
RAD ONC ARIA COURSE LAST TREATMENT DATE: NORMAL
RAD ONC ARIA COURSE SESSION NUMBER: 25
RAD ONC ARIA COURSE START DATE: NORMAL
RAD ONC ARIA COURSE TREATMENT ELAPSED DAYS: 35
RAD ONC ARIA PLAN FRACTIONS TREATED TO DATE: 25
RAD ONC ARIA PLAN ID: NORMAL
RAD ONC ARIA PLAN PRESCRIBED DOSE PER FRACTION: 1.8 GY
RAD ONC ARIA PLAN PRIMARY REFERENCE POINT: NORMAL
RAD ONC ARIA PLAN TOTAL FRACTIONS PRESCRIBED: 28
RAD ONC ARIA PLAN TOTAL PRESCRIBED DOSE: 5040 CGY
RAD ONC ARIA REFERENCE POINT DOSAGE GIVEN TO DATE: 44.83 GY
RAD ONC ARIA REFERENCE POINT DOSAGE GIVEN TO DATE: 45 GY
RAD ONC ARIA REFERENCE POINT ID: NORMAL
RAD ONC ARIA REFERENCE POINT SESSION DOSAGE GIVEN: 1.79 GY
RAD ONC ARIA REFERENCE POINT SESSION DOSAGE GIVEN: 1.8 GY

## 2024-06-19 ENCOUNTER — HOSPITAL ENCOUNTER (OUTPATIENT)
Facility: HOSPITAL | Age: 54
Discharge: HOME | End: 2024-06-22
Attending: RADIOLOGY

## 2024-06-19 LAB
RAD ONC ARIA COURSE FIRST TREATMENT DATE: NORMAL
RAD ONC ARIA COURSE ID: NORMAL
RAD ONC ARIA COURSE LAST TREATMENT DATE: NORMAL
RAD ONC ARIA COURSE SESSION NUMBER: 26
RAD ONC ARIA COURSE START DATE: NORMAL
RAD ONC ARIA COURSE TREATMENT ELAPSED DAYS: 36
RAD ONC ARIA PLAN FRACTIONS TREATED TO DATE: 26
RAD ONC ARIA PLAN ID: NORMAL
RAD ONC ARIA PLAN PRESCRIBED DOSE PER FRACTION: 1.8 GY
RAD ONC ARIA PLAN PRIMARY REFERENCE POINT: NORMAL
RAD ONC ARIA PLAN TOTAL FRACTIONS PRESCRIBED: 28
RAD ONC ARIA PLAN TOTAL PRESCRIBED DOSE: 5040 CGY
RAD ONC ARIA REFERENCE POINT DOSAGE GIVEN TO DATE: 46.62 GY
RAD ONC ARIA REFERENCE POINT DOSAGE GIVEN TO DATE: 46.8 GY
RAD ONC ARIA REFERENCE POINT ID: NORMAL
RAD ONC ARIA REFERENCE POINT SESSION DOSAGE GIVEN: 1.79 GY
RAD ONC ARIA REFERENCE POINT SESSION DOSAGE GIVEN: 1.8 GY

## 2024-06-20 ENCOUNTER — HOSPITAL ENCOUNTER (OUTPATIENT)
Facility: HOSPITAL | Age: 54
Discharge: HOME | End: 2024-06-23
Attending: RADIOLOGY

## 2024-06-20 DIAGNOSIS — C50.412 MALIGNANT NEOPLASM OF UPPER-OUTER QUADRANT OF LEFT BREAST IN FEMALE, ESTROGEN RECEPTOR POSITIVE (HCC): Primary | ICD-10-CM

## 2024-06-20 DIAGNOSIS — Z17.0 MALIGNANT NEOPLASM OF UPPER-OUTER QUADRANT OF LEFT BREAST IN FEMALE, ESTROGEN RECEPTOR POSITIVE (HCC): Primary | ICD-10-CM

## 2024-06-20 LAB
RAD ONC ARIA COURSE FIRST TREATMENT DATE: NORMAL
RAD ONC ARIA COURSE ID: NORMAL
RAD ONC ARIA COURSE LAST TREATMENT DATE: NORMAL
RAD ONC ARIA COURSE SESSION NUMBER: 27
RAD ONC ARIA COURSE START DATE: NORMAL
RAD ONC ARIA COURSE TREATMENT ELAPSED DAYS: 37
RAD ONC ARIA PLAN FRACTIONS TREATED TO DATE: 27
RAD ONC ARIA PLAN ID: NORMAL
RAD ONC ARIA PLAN PRESCRIBED DOSE PER FRACTION: 1.8 GY
RAD ONC ARIA PLAN PRIMARY REFERENCE POINT: NORMAL
RAD ONC ARIA PLAN TOTAL FRACTIONS PRESCRIBED: 28
RAD ONC ARIA PLAN TOTAL PRESCRIBED DOSE: 5040 CGY
RAD ONC ARIA REFERENCE POINT DOSAGE GIVEN TO DATE: 48.42 GY
RAD ONC ARIA REFERENCE POINT DOSAGE GIVEN TO DATE: 48.6 GY
RAD ONC ARIA REFERENCE POINT ID: NORMAL
RAD ONC ARIA REFERENCE POINT SESSION DOSAGE GIVEN: 1.79 GY
RAD ONC ARIA REFERENCE POINT SESSION DOSAGE GIVEN: 1.8 GY

## 2024-06-20 ASSESSMENT — PAIN SCALES - GENERAL: PAINLEVEL_OUTOF10: 0

## 2024-06-20 NOTE — PROGRESS NOTES
Cancer [place] at [facility]  [facility]    Radiation Oncology Weekly Progress Note  Encounter Date: 06/20/24     Madelin Parekh  [id number]  1970     Diagnosis    Cancer Staging   Malignant neoplasm of upper-outer quadrant of left breast in female, estrogen receptor positive (HCC)  Staging form: Breast, AJCC 8th Edition  - Pathologic stage from 3/19/2024: No Stage Recommended (ypT1b, pN1mi, cM0, G2, ER+, AR+, HER2-) - Signed by Christopher Camp MD on 4/9/2024  - Clinical: Stage IB (cT1c, cN1, cM0, G2, ER+, AR+, HER2-) - Signed by Christopher Camp MD on 4/9/2024    AJCC Staging has been reviewed.    Interval History   Ms. Parekh is a 54 y.o. female seen today for her weekly on-treatment evaluation    5/16/24: first week, questions answered, discussed skincare.  5/23/24- doing well, no skin changes or complaints.  5/30/24- doing well, anxious at baseline, no significant skin changes or complaints.  6/5/24- doing well but baseline anxiety. Takes 25 Zoloft daily, has Xanax and Clonazepam for sleep and anxiety. Will speak with PCP about if she should adjust meds right now since she is more anxious. Has bone density tomorrow and she is very nervous this will show cancer. Discussed why Dr. Matt ordered this test and what we are looking for with it. She understands but is still very anxious. No radiation side effects at this time.  6/13/24: Doing well today.  Has good energy but waking up in the middle of the night as per her baseline.  Using skin cream daily.  All questions answered. Minimal erythema in field, skin and scars intact.   6/20/2024: Doing well today.  Using skin cream daily.  Still waking up at night but has good energy.  We reviewed the follow-up plan. On exam, there is minimal to moderate erythema in field, mildly increased from last week.  Skin and scars intact.    Treatment Details:     Treatment Site Dose/Fx (cGy) #Fx Current Dose (cGy) Total Planned Dose (cGy)

## 2024-06-21 ENCOUNTER — HOSPITAL ENCOUNTER (OUTPATIENT)
Facility: HOSPITAL | Age: 54
Discharge: HOME | End: 2024-06-24
Attending: RADIOLOGY

## 2024-06-21 LAB
RAD ONC ARIA COURSE FIRST TREATMENT DATE: NORMAL
RAD ONC ARIA COURSE ID: NORMAL
RAD ONC ARIA COURSE LAST TREATMENT DATE: NORMAL
RAD ONC ARIA COURSE SESSION NUMBER: 28
RAD ONC ARIA COURSE START DATE: NORMAL
RAD ONC ARIA COURSE TREATMENT ELAPSED DAYS: 38
RAD ONC ARIA PLAN FRACTIONS TREATED TO DATE: 28
RAD ONC ARIA PLAN ID: NORMAL
RAD ONC ARIA PLAN PRESCRIBED DOSE PER FRACTION: 1.8 GY
RAD ONC ARIA PLAN PRIMARY REFERENCE POINT: NORMAL
RAD ONC ARIA PLAN TOTAL FRACTIONS PRESCRIBED: 28
RAD ONC ARIA PLAN TOTAL PRESCRIBED DOSE: 5040 CGY
RAD ONC ARIA REFERENCE POINT DOSAGE GIVEN TO DATE: 50.21 GY
RAD ONC ARIA REFERENCE POINT DOSAGE GIVEN TO DATE: 50.4 GY
RAD ONC ARIA REFERENCE POINT ID: NORMAL
RAD ONC ARIA REFERENCE POINT SESSION DOSAGE GIVEN: 1.79 GY
RAD ONC ARIA REFERENCE POINT SESSION DOSAGE GIVEN: 1.8 GY

## 2024-06-24 ENCOUNTER — HOSPITAL ENCOUNTER (OUTPATIENT)
Facility: HOSPITAL | Age: 54
Discharge: HOME | End: 2024-06-27
Attending: RADIOLOGY

## 2024-06-24 LAB
RAD ONC ARIA COURSE FIRST TREATMENT DATE: NORMAL
RAD ONC ARIA COURSE ID: NORMAL
RAD ONC ARIA COURSE LAST TREATMENT DATE: NORMAL
RAD ONC ARIA COURSE SESSION NUMBER: 29
RAD ONC ARIA COURSE START DATE: NORMAL
RAD ONC ARIA COURSE TREATMENT ELAPSED DAYS: 41
RAD ONC ARIA PLAN FRACTIONS TREATED TO DATE: 1
RAD ONC ARIA PLAN ID: NORMAL
RAD ONC ARIA PLAN PRESCRIBED DOSE PER FRACTION: 2 GY
RAD ONC ARIA PLAN PRIMARY REFERENCE POINT: NORMAL
RAD ONC ARIA PLAN TOTAL FRACTIONS PRESCRIBED: 5
RAD ONC ARIA PLAN TOTAL PRESCRIBED DOSE: 1000 CGY
RAD ONC ARIA REFERENCE POINT DOSAGE GIVEN TO DATE: 2 GY
RAD ONC ARIA REFERENCE POINT DOSAGE GIVEN TO DATE: 2.05 GY
RAD ONC ARIA REFERENCE POINT ID: NORMAL
RAD ONC ARIA REFERENCE POINT ID: NORMAL
RAD ONC ARIA REFERENCE POINT SESSION DOSAGE GIVEN: 2 GY
RAD ONC ARIA REFERENCE POINT SESSION DOSAGE GIVEN: 2.05 GY

## 2024-06-25 ENCOUNTER — HOSPITAL ENCOUNTER (OUTPATIENT)
Facility: HOSPITAL | Age: 54
Discharge: HOME | End: 2024-06-28
Attending: RADIOLOGY

## 2024-06-25 ENCOUNTER — OFFICE VISIT (OUTPATIENT)
Age: 54
End: 2024-06-25
Payer: COMMERCIAL

## 2024-06-25 VITALS
TEMPERATURE: 98.5 F | OXYGEN SATURATION: 97 % | WEIGHT: 104.6 LBS | SYSTOLIC BLOOD PRESSURE: 106 MMHG | HEIGHT: 64 IN | BODY MASS INDEX: 17.86 KG/M2 | HEART RATE: 68 BPM | DIASTOLIC BLOOD PRESSURE: 61 MMHG | RESPIRATION RATE: 18 BRPM

## 2024-06-25 DIAGNOSIS — Z79.899 ENCOUNTER FOR MONITORING CARDIOTOXIC DRUG THERAPY: ICD-10-CM

## 2024-06-25 DIAGNOSIS — T45.1X5A CHEMOTHERAPY INDUCED NEUTROPENIA (HCC): ICD-10-CM

## 2024-06-25 DIAGNOSIS — D70.1 CHEMOTHERAPY INDUCED NEUTROPENIA (HCC): ICD-10-CM

## 2024-06-25 DIAGNOSIS — Z17.0 MALIGNANT NEOPLASM OF CENTRAL PORTION OF LEFT BREAST IN FEMALE, ESTROGEN RECEPTOR POSITIVE (HCC): Primary | ICD-10-CM

## 2024-06-25 DIAGNOSIS — Z17.0 MALIGNANT NEOPLASM OF UPPER-OUTER QUADRANT OF LEFT BREAST IN FEMALE, ESTROGEN RECEPTOR POSITIVE (HCC): ICD-10-CM

## 2024-06-25 DIAGNOSIS — M85.89 OSTEOPENIA OF MULTIPLE SITES: Primary | ICD-10-CM

## 2024-06-25 DIAGNOSIS — C50.112 MALIGNANT NEOPLASM OF CENTRAL PORTION OF LEFT BREAST IN FEMALE, ESTROGEN RECEPTOR POSITIVE (HCC): Primary | ICD-10-CM

## 2024-06-25 DIAGNOSIS — C50.412 MALIGNANT NEOPLASM OF UPPER-OUTER QUADRANT OF LEFT BREAST IN FEMALE, ESTROGEN RECEPTOR POSITIVE (HCC): ICD-10-CM

## 2024-06-25 DIAGNOSIS — Z51.81 ENCOUNTER FOR MONITORING CARDIOTOXIC DRUG THERAPY: ICD-10-CM

## 2024-06-25 LAB
RAD ONC ARIA COURSE FIRST TREATMENT DATE: NORMAL
RAD ONC ARIA COURSE ID: NORMAL
RAD ONC ARIA COURSE LAST TREATMENT DATE: NORMAL
RAD ONC ARIA COURSE SESSION NUMBER: 30
RAD ONC ARIA COURSE START DATE: NORMAL
RAD ONC ARIA COURSE TREATMENT ELAPSED DAYS: 42
RAD ONC ARIA PLAN FRACTIONS TREATED TO DATE: 2
RAD ONC ARIA PLAN ID: NORMAL
RAD ONC ARIA PLAN PRESCRIBED DOSE PER FRACTION: 2 GY
RAD ONC ARIA PLAN PRIMARY REFERENCE POINT: NORMAL
RAD ONC ARIA PLAN TOTAL FRACTIONS PRESCRIBED: 5
RAD ONC ARIA PLAN TOTAL PRESCRIBED DOSE: 1000 CGY
RAD ONC ARIA REFERENCE POINT DOSAGE GIVEN TO DATE: 4 GY
RAD ONC ARIA REFERENCE POINT DOSAGE GIVEN TO DATE: 4.09 GY
RAD ONC ARIA REFERENCE POINT ID: NORMAL
RAD ONC ARIA REFERENCE POINT ID: NORMAL
RAD ONC ARIA REFERENCE POINT SESSION DOSAGE GIVEN: 2 GY
RAD ONC ARIA REFERENCE POINT SESSION DOSAGE GIVEN: 2.05 GY

## 2024-06-25 PROCEDURE — 99214 OFFICE O/P EST MOD 30 MIN: CPT | Performed by: INTERNAL MEDICINE

## 2024-06-25 RX ORDER — HEPARIN SODIUM (PORCINE) LOCK FLUSH IV SOLN 100 UNIT/ML 100 UNIT/ML
500 SOLUTION INTRAVENOUS PRN
OUTPATIENT
Start: 2024-07-16

## 2024-06-25 RX ORDER — ZOLEDRONIC ACID 0.04 MG/ML
4 INJECTION, SOLUTION INTRAVENOUS ONCE
OUTPATIENT
Start: 2024-07-16 | End: 2024-07-16

## 2024-06-25 RX ORDER — SODIUM CHLORIDE 9 MG/ML
INJECTION, SOLUTION INTRAVENOUS CONTINUOUS
OUTPATIENT
Start: 2024-07-16

## 2024-06-25 RX ORDER — SODIUM CHLORIDE 0.9 % (FLUSH) 0.9 %
5-40 SYRINGE (ML) INJECTION PRN
OUTPATIENT
Start: 2024-07-16

## 2024-06-25 RX ORDER — SODIUM CHLORIDE 9 MG/ML
5-250 INJECTION, SOLUTION INTRAVENOUS PRN
OUTPATIENT
Start: 2024-07-16

## 2024-06-25 RX ORDER — FAMOTIDINE 10 MG/ML
20 INJECTION, SOLUTION INTRAVENOUS
OUTPATIENT
Start: 2024-07-16

## 2024-06-25 RX ORDER — EPINEPHRINE 1 MG/ML
0.3 INJECTION, SOLUTION, CONCENTRATE INTRAVENOUS PRN
OUTPATIENT
Start: 2024-07-16

## 2024-06-25 RX ORDER — DIPHENHYDRAMINE HYDROCHLORIDE 50 MG/ML
50 INJECTION INTRAMUSCULAR; INTRAVENOUS
OUTPATIENT
Start: 2024-07-16

## 2024-06-25 RX ORDER — ONDANSETRON 2 MG/ML
8 INJECTION INTRAMUSCULAR; INTRAVENOUS
OUTPATIENT
Start: 2024-07-16

## 2024-06-25 RX ORDER — ACETAMINOPHEN 325 MG/1
650 TABLET ORAL
OUTPATIENT
Start: 2024-07-16

## 2024-06-25 RX ORDER — ALBUTEROL SULFATE 90 UG/1
4 AEROSOL, METERED RESPIRATORY (INHALATION) PRN
OUTPATIENT
Start: 2024-07-16

## 2024-06-25 NOTE — PROGRESS NOTES
[facility] [place] at [facility]  [address], [address] [facility] [address]  W: [phone]  F: [phone]      Reason for Visit:   Madelin Parekh is a 54 y.o. female who is seen in follow up for breast cancer.    Breast Surgeon: Dr. Forrest  Rad onc:  Dr. Camp    Treatment History:   7/18/23 left breast stellate mass, core bx:  IDC, gr 2, 4 mm, ER + at 98%, CT + at 54%, HER 2 negative at IHC 1+ (SOHA ration 1; sig/cell 1.85), ki67 59%, DCIS, no LVI  Mammaprint shows luminal B, high risk, index -0.160  8/1/23 left axilla LN core bx:  + for breast cancer  Genetic testing , negative BRCA 1/2 by [facility] 2021  NSABP FB-12  AC 9/20/23 - 11/1/23  Paclitaxel/trastuzumab/pertuzumab 11/15/23-2/27/24  Held taxol due to ANC 0.9 on c1d1  Held taxol due to ANC 0.7 on C2d1  c3d8 skipped for neutropenia  C3d15 skipped for neutropenia   taxol at c4d1 to 65 mg/m2  3/19/24 left breast lumpectomy:  gr 1, 1 cm, DCIS, gr 3, cribriform, + LVI, 1/11 LN + with 0.6 mm micromet, ypT1b znP1cfs cM0  XRT 5/14/24-7/1/24  Anastrozole 7/8/24-    History of Present Illness:   An abnormal mammogram led to the pathology above.      Interval history:  Patient presents today for follow up and treatment. Reports gr 1 insomnia, gr 1 itching    FH:  mother with breast cancer, dx at age 83; maternal aunt with breast cancer, dx 50-60s and lung cancer; maternal aunt:  ovarian cancer, dx 40s; father with colon cancer dx 60s    Lmp 8/2023    Past Medical History:   Diagnosis Date    Anxiety     Breast cancer (HCC)     Deviated septum     Hx antineoplastic chemo     Hx of blood clots     near port a cath area- eliquis therapy    Invasive ductal carcinoma of breast, female, left (HCC) 07/24/2023 7/18/23: LEFT breast, Stellate Mass, Stereotatic biopsy: invasive and in situ carcinoma, grade 2. Largest tumor focus measured 4mm. No lymphovascular invasion seen. Prognostic markers pending.    Murmur     PONV (postoperative

## 2024-06-27 ENCOUNTER — HOSPITAL ENCOUNTER (OUTPATIENT)
Facility: HOSPITAL | Age: 54
Discharge: HOME | End: 2024-06-30
Attending: RADIOLOGY

## 2024-06-27 LAB
RAD ONC ARIA COURSE FIRST TREATMENT DATE: NORMAL
RAD ONC ARIA COURSE ID: NORMAL
RAD ONC ARIA COURSE LAST TREATMENT DATE: NORMAL
RAD ONC ARIA COURSE SESSION NUMBER: 31
RAD ONC ARIA COURSE START DATE: NORMAL
RAD ONC ARIA COURSE TREATMENT ELAPSED DAYS: 44
RAD ONC ARIA PLAN FRACTIONS TREATED TO DATE: 3
RAD ONC ARIA PLAN ID: NORMAL
RAD ONC ARIA PLAN PRESCRIBED DOSE PER FRACTION: 2 GY
RAD ONC ARIA PLAN PRIMARY REFERENCE POINT: NORMAL
RAD ONC ARIA PLAN TOTAL FRACTIONS PRESCRIBED: 5
RAD ONC ARIA PLAN TOTAL PRESCRIBED DOSE: 1000 CGY
RAD ONC ARIA REFERENCE POINT DOSAGE GIVEN TO DATE: 6 GY
RAD ONC ARIA REFERENCE POINT DOSAGE GIVEN TO DATE: 6.14 GY
RAD ONC ARIA REFERENCE POINT ID: NORMAL
RAD ONC ARIA REFERENCE POINT ID: NORMAL
RAD ONC ARIA REFERENCE POINT SESSION DOSAGE GIVEN: 2 GY
RAD ONC ARIA REFERENCE POINT SESSION DOSAGE GIVEN: 2.05 GY

## 2024-06-27 ASSESSMENT — PAIN SCALES - GENERAL: PAINLEVEL_OUTOF10: 0

## 2024-06-28 ENCOUNTER — HOSPITAL ENCOUNTER (OUTPATIENT)
Facility: HOSPITAL | Age: 54
Discharge: HOME | End: 2024-07-01
Attending: RADIOLOGY

## 2024-06-28 LAB
RAD ONC ARIA COURSE FIRST TREATMENT DATE: NORMAL
RAD ONC ARIA COURSE ID: NORMAL
RAD ONC ARIA COURSE LAST TREATMENT DATE: NORMAL
RAD ONC ARIA COURSE SESSION NUMBER: 32
RAD ONC ARIA COURSE START DATE: NORMAL
RAD ONC ARIA COURSE TREATMENT ELAPSED DAYS: 45
RAD ONC ARIA PLAN FRACTIONS TREATED TO DATE: 4
RAD ONC ARIA PLAN ID: NORMAL
RAD ONC ARIA PLAN PRESCRIBED DOSE PER FRACTION: 2 GY
RAD ONC ARIA PLAN PRIMARY REFERENCE POINT: NORMAL
RAD ONC ARIA PLAN TOTAL FRACTIONS PRESCRIBED: 5
RAD ONC ARIA PLAN TOTAL PRESCRIBED DOSE: 1000 CGY
RAD ONC ARIA REFERENCE POINT DOSAGE GIVEN TO DATE: 8 GY
RAD ONC ARIA REFERENCE POINT DOSAGE GIVEN TO DATE: 8.18 GY
RAD ONC ARIA REFERENCE POINT ID: NORMAL
RAD ONC ARIA REFERENCE POINT ID: NORMAL
RAD ONC ARIA REFERENCE POINT SESSION DOSAGE GIVEN: 2 GY
RAD ONC ARIA REFERENCE POINT SESSION DOSAGE GIVEN: 2.05 GY

## 2024-07-01 ENCOUNTER — HOSPITAL ENCOUNTER (OUTPATIENT)
Facility: HOSPITAL | Age: 54
Discharge: HOME | End: 2024-07-04
Attending: RADIOLOGY

## 2024-07-01 LAB
RAD ONC ARIA COURSE FIRST TREATMENT DATE: NORMAL
RAD ONC ARIA COURSE ID: NORMAL
RAD ONC ARIA COURSE LAST TREATMENT DATE: NORMAL
RAD ONC ARIA COURSE SESSION NUMBER: 33
RAD ONC ARIA COURSE START DATE: NORMAL
RAD ONC ARIA COURSE TREATMENT ELAPSED DAYS: 48
RAD ONC ARIA PLAN FRACTIONS TREATED TO DATE: 5
RAD ONC ARIA PLAN ID: NORMAL
RAD ONC ARIA PLAN PRESCRIBED DOSE PER FRACTION: 2 GY
RAD ONC ARIA PLAN PRIMARY REFERENCE POINT: NORMAL
RAD ONC ARIA PLAN TOTAL FRACTIONS PRESCRIBED: 5
RAD ONC ARIA PLAN TOTAL PRESCRIBED DOSE: 1000 CGY
RAD ONC ARIA REFERENCE POINT DOSAGE GIVEN TO DATE: 10 GY
RAD ONC ARIA REFERENCE POINT DOSAGE GIVEN TO DATE: 10.23 GY
RAD ONC ARIA REFERENCE POINT ID: NORMAL
RAD ONC ARIA REFERENCE POINT ID: NORMAL
RAD ONC ARIA REFERENCE POINT SESSION DOSAGE GIVEN: 2 GY
RAD ONC ARIA REFERENCE POINT SESSION DOSAGE GIVEN: 2.05 GY

## 2024-07-02 ENCOUNTER — TELEPHONE (OUTPATIENT)
Age: 54
End: 2024-07-02

## 2024-07-02 NOTE — TELEPHONE ENCOUNTER
Patient called and wanted to move her opic apt from over here to [facility] told her the time I had and she stated that was fine

## 2024-07-16 ENCOUNTER — HOSPITAL ENCOUNTER (OUTPATIENT)
Facility: HOSPITAL | Age: 54
Setting detail: INFUSION SERIES
Discharge: HOME | End: 2024-07-16
Payer: COMMERCIAL

## 2024-07-16 ENCOUNTER — TELEPHONE (OUTPATIENT)
Facility: HOSPITAL | Age: 54
End: 2024-07-16

## 2024-07-16 ENCOUNTER — APPOINTMENT (OUTPATIENT)
Facility: HOSPITAL | Age: 54
End: 2024-07-16
Payer: COMMERCIAL

## 2024-07-16 VITALS
TEMPERATURE: 98 F | RESPIRATION RATE: 18 BRPM | WEIGHT: 106.2 LBS | BODY MASS INDEX: 18.13 KG/M2 | SYSTOLIC BLOOD PRESSURE: 100 MMHG | HEART RATE: 74 BPM | DIASTOLIC BLOOD PRESSURE: 62 MMHG | HEIGHT: 64 IN

## 2024-07-16 DIAGNOSIS — Z17.0 MALIGNANT NEOPLASM OF UPPER-OUTER QUADRANT OF LEFT BREAST IN FEMALE, ESTROGEN RECEPTOR POSITIVE (HCC): Primary | ICD-10-CM

## 2024-07-16 DIAGNOSIS — D70.1 CHEMOTHERAPY INDUCED NEUTROPENIA (HCC): ICD-10-CM

## 2024-07-16 DIAGNOSIS — M85.89 OSTEOPENIA OF MULTIPLE SITES: ICD-10-CM

## 2024-07-16 DIAGNOSIS — T45.1X5A CHEMOTHERAPY INDUCED NEUTROPENIA (HCC): ICD-10-CM

## 2024-07-16 DIAGNOSIS — C50.412 MALIGNANT NEOPLASM OF UPPER-OUTER QUADRANT OF LEFT BREAST IN FEMALE, ESTROGEN RECEPTOR POSITIVE (HCC): Primary | ICD-10-CM

## 2024-07-16 LAB
ALBUMIN SERPL-MCNC: 4 G/DL (ref 3.5–5)
ALBUMIN/GLOB SERPL: 1.2 (ref 1.1–2.2)
ALP SERPL-CCNC: 55 U/L (ref 45–117)
ALT SERPL-CCNC: 26 U/L (ref 12–78)
ANION GAP BLD CALC-SCNC: 3.8 MMOL/L (ref 10–20)
ANION GAP SERPL CALC-SCNC: 7 MMOL/L (ref 5–15)
AST SERPL-CCNC: ABNORMAL U/L (ref 15–37)
BILIRUB SERPL-MCNC: 0.6 MG/DL (ref 0.2–1)
BUN SERPL-MCNC: 14 MG/DL (ref 6–20)
BUN/CREAT SERPL: 26 (ref 12–20)
CA-I BLD-MCNC: 1.15 MMOL/L (ref 1.12–1.32)
CALCIUM SERPL-MCNC: 9.3 MG/DL (ref 8.5–10.1)
CHLORIDE BLD-SCNC: 101 MMOL/L (ref 98–107)
CHLORIDE SERPL-SCNC: 101 MMOL/L (ref 97–108)
CO2 BLD-SCNC: 31.2 MMOL/L (ref 21–32)
CO2 SERPL-SCNC: 29 MMOL/L (ref 21–32)
CREAT BLD-MCNC: 0.54 MG/DL (ref 0.6–1.3)
CREAT SERPL-MCNC: 0.54 MG/DL (ref 0.55–1.02)
GLOBULIN SER CALC-MCNC: 3.3 G/DL (ref 2–4)
GLUCOSE BLD-MCNC: 94 MG/DL (ref 70–110)
GLUCOSE SERPL-MCNC: 91 MG/DL (ref 65–100)
POTASSIUM BLD-SCNC: 5.4 MMOL/L (ref 3.5–5.1)
POTASSIUM SERPL-SCNC: ABNORMAL MMOL/L (ref 3.5–5.1)
PROT SERPL-MCNC: 7.3 G/DL (ref 6.4–8.2)
SERVICE CMNT-IMP: ABNORMAL
SODIUM BLD-SCNC: 136 MMOL/L (ref 136–145)
SODIUM SERPL-SCNC: 137 MMOL/L (ref 136–145)

## 2024-07-16 PROCEDURE — 80053 COMPREHEN METABOLIC PANEL: CPT

## 2024-07-16 PROCEDURE — 36415 COLL VENOUS BLD VENIPUNCTURE: CPT

## 2024-07-16 PROCEDURE — 6360000002 HC RX W HCPCS: Performed by: NURSE PRACTITIONER

## 2024-07-16 PROCEDURE — 80047 BASIC METABLC PNL IONIZED CA: CPT

## 2024-07-16 PROCEDURE — 96365 THER/PROPH/DIAG IV INF INIT: CPT

## 2024-07-16 RX ORDER — SODIUM CHLORIDE 0.9 % (FLUSH) 0.9 %
5-40 SYRINGE (ML) INJECTION PRN
OUTPATIENT
Start: 2025-01-14

## 2024-07-16 RX ORDER — ZOLEDRONIC ACID 0.04 MG/ML
4 INJECTION, SOLUTION INTRAVENOUS ONCE
Status: COMPLETED | OUTPATIENT
Start: 2024-07-16 | End: 2024-07-16

## 2024-07-16 RX ORDER — ALBUTEROL SULFATE 90 UG/1
4 AEROSOL, METERED RESPIRATORY (INHALATION) PRN
OUTPATIENT
Start: 2025-01-14

## 2024-07-16 RX ORDER — SODIUM CHLORIDE 9 MG/ML
5-250 INJECTION, SOLUTION INTRAVENOUS PRN
Status: DISCONTINUED | OUTPATIENT
Start: 2024-07-16 | End: 2024-07-17 | Stop reason: HOSPADM

## 2024-07-16 RX ORDER — SODIUM CHLORIDE 9 MG/ML
5-250 INJECTION, SOLUTION INTRAVENOUS PRN
OUTPATIENT
Start: 2025-01-14

## 2024-07-16 RX ORDER — EPINEPHRINE 1 MG/ML
0.3 INJECTION, SOLUTION INTRAMUSCULAR; SUBCUTANEOUS PRN
OUTPATIENT
Start: 2025-01-14

## 2024-07-16 RX ORDER — HEPARIN 100 UNIT/ML
500 SYRINGE INTRAVENOUS PRN
OUTPATIENT
Start: 2025-01-14

## 2024-07-16 RX ORDER — DIPHENHYDRAMINE HYDROCHLORIDE 50 MG/ML
50 INJECTION INTRAMUSCULAR; INTRAVENOUS
OUTPATIENT
Start: 2025-01-14

## 2024-07-16 RX ORDER — ONDANSETRON 2 MG/ML
8 INJECTION INTRAMUSCULAR; INTRAVENOUS
OUTPATIENT
Start: 2025-01-14

## 2024-07-16 RX ORDER — SODIUM CHLORIDE 9 MG/ML
INJECTION, SOLUTION INTRAVENOUS CONTINUOUS
OUTPATIENT
Start: 2025-01-14

## 2024-07-16 RX ORDER — ACETAMINOPHEN 325 MG/1
650 TABLET ORAL
OUTPATIENT
Start: 2025-01-14

## 2024-07-16 RX ORDER — ZOLEDRONIC ACID 0.04 MG/ML
4 INJECTION, SOLUTION INTRAVENOUS ONCE
OUTPATIENT
Start: 2025-01-14 | End: 2025-01-14

## 2024-07-16 RX ORDER — HEPARIN 100 UNIT/ML
500 SYRINGE INTRAVENOUS PRN
Status: DISCONTINUED | OUTPATIENT
Start: 2024-07-16 | End: 2024-07-17 | Stop reason: HOSPADM

## 2024-07-16 RX ORDER — SODIUM CHLORIDE 0.9 % (FLUSH) 0.9 %
5-40 SYRINGE (ML) INJECTION PRN
Status: DISCONTINUED | OUTPATIENT
Start: 2024-07-16 | End: 2024-07-17 | Stop reason: HOSPADM

## 2024-07-16 RX ADMIN — ZOLEDRONIC ACID 4 MG: 0.04 INJECTION, SOLUTION INTRAVENOUS at 15:32

## 2024-07-16 ASSESSMENT — PAIN SCALES - GENERAL: PAINLEVEL_OUTOF10: 0

## 2024-07-16 NOTE — TELEPHONE ENCOUNTER
[facility]  Breast Navigator Encounter    Name:    Madelin Parekh  Age:    54 y.o.  Diagnosis:    LEFT breast cancer    Returned patient's call.      She has recently completed her radiation, is on anastrozole prescribed by Dr. Matt.  Chemotherapy/surgery have been done.   Has normal feelings post-active treatment where she is asking herself if she should have had a mastectomy instead of lumpectomy, and wondering how to  where she left off prior to treatment.  Thinks counseling may be useful.  She has some names given to her by Simi,  for Dr. Matt.  I also suggested Jacquie today and explained that as a patient of the cancer institute she can get 10 free counseling sessions with her.  She will think about this.  If she decides to try it, she will let me know if the sessions were beneficial at addressing the concerns/fears that she has now.     Is still considering a mastectomy.  Just completed radiation within the last couple of weeks.  Dr. Forrest advised she not consider this until she is at least six months S/P radiation, and I explained why.      I validated her feelings today.  I told her things will get easier for her, but it will take time and small steps to get there.      Discussed her prognosis, which is excellent and also briefly discussed prognostic stage (she is a 1B).  I suggested that she talk to Dr. Matt/BARBER Cabarl about prognostic stage when she is seen next time.    She was very appreciative of the phone call. She will call me back with any further questions or to report if the counseling with Jacquie was useful for her.     I e-mailed her the mastectomy/recon article that I have to her e-mail at [email or web address].                              Lauren Nath RN, BSN, CBCN  Oncology Breast Navigator     [address]  [address]  W: [phone]  F: [phone]  [email or web address]  Good Help to

## 2024-07-16 NOTE — PROGRESS NOTES
[facility] Short Note                       Date: 2024    Name: Madelin Parekh    MRN: [id number]         : 1970      15:00 Pt admit to [facility] for ZOMETA ambulatory in stable condition. Assessment completed. No new concerns voiced.  Please review pending lab results in CC.      Ms. Parekh's vitals were reviewed prior to and after treatment.   Patient Vitals for the past 12 hrs:   Temp Pulse Resp BP   24 1556 -- 74 -- 100/62   24 1500 98 °F (36.7 °C) 75 18 116/76         Lab results were obtained and reviewed.  Recent Results (from the past 12 hour(s))   POC CHEM 8    Collection Time: 24  3:08 PM   Result Value Ref Range    POC Ionized Calcium 1.15 1.12 - 1.32 mmol/L    POC Sodium 136 136 - 145 mmol/L    POC Potassium 5.4 (H) 3.5 - 5.1 mmol/L    POC Chloride 101 98 - 107 mmol/L    POC TCO2 31.2 21 - 32 mmol/L    Anion Gap, POC 3.8 (L) 10 - 20 mmol/L    POC Glucose 94 70 - 110 mg/dL    POC Creatinine 0.54 (L) 0.6 - 1.3 mg/dL    eGFR, POC >90 >60 ml/min/1.73m2    UA Comment Comment Not Indicated.         Medications given: PIV right ac  Medications Administered         zoledronic acid (ZOMETA) 4 mg/100 mL infusion Admin Date  2024 Action  New Bag Dose  4 mg Rate  300 mL/hr Route  IntraVENous Administered By  Johanna Moore, RN        zoledronic acid (ZOMETA) 4 mg/100 mL infusion Admin Date  2024 Action  Rate/Dose Verify Dose   Rate  300 mL/hr Route  IntraVENous Administered By  Johanna Moore, RN          Ms. Parekh tolerated the infusion, and had no complaints.    [facility].     Future Appointments   Date Time Provider Department Center   2024  9:00 AM [facility] ECHO LAB 1 [facility]   2024 11:15 AM Chintan Matt MD [facility] BS AMB   10/2/2024  9:05 AM [facility] LYNETTE 5 [facility]AM [facility]   10/2/2024  9:30 AM [facility] 2 [facility]   10/4/2024  9:15 AM Ton Forrest Jr, MD [facility]   2025  8:30 AM

## 2024-07-17 ENCOUNTER — TELEPHONE (OUTPATIENT)
Facility: HOSPITAL | Age: 54
End: 2024-07-17

## 2024-07-17 NOTE — TELEPHONE ENCOUNTER
you met, something you learned about life, something that gave you a different perspective, and focus on that.      I promise you that things will get better.  This is all a process (albeit a slow one sometimes), and one step at a time you will get to your eventual goal.      Let me know if there is anything else I can do to help.                            Lauren Nath RN, BSN, [facility]  Oncology Breast Navigator     [address]  [address]  W: [phone]  F: [phone]  [email or web address]  Good Help to Those in Need®

## 2024-07-30 ENCOUNTER — HOSPITAL ENCOUNTER (OUTPATIENT)
Facility: HOSPITAL | Age: 54
Discharge: HOME | End: 2024-08-01
Attending: INTERNAL MEDICINE
Payer: COMMERCIAL

## 2024-07-30 VITALS
SYSTOLIC BLOOD PRESSURE: 100 MMHG | WEIGHT: 106.26 LBS | BODY MASS INDEX: 18.14 KG/M2 | HEIGHT: 64 IN | DIASTOLIC BLOOD PRESSURE: 60 MMHG

## 2024-07-30 DIAGNOSIS — Z79.899 ENCOUNTER FOR MONITORING CARDIOTOXIC DRUG THERAPY: ICD-10-CM

## 2024-07-30 DIAGNOSIS — Z51.81 ENCOUNTER FOR MONITORING CARDIOTOXIC DRUG THERAPY: ICD-10-CM

## 2024-07-30 LAB
ECHO AV AREA PEAK VELOCITY: 2.8 CM2
ECHO AV AREA/BSA PEAK VELOCITY: 1.9 CM2/M2
ECHO AV PEAK GRADIENT: 7 MMHG
ECHO AV PEAK VELOCITY: 1.4 M/S
ECHO AV VELOCITY RATIO: 0.79
ECHO BSA: 1.48 M2
ECHO EST RA PRESSURE: 3 MMHG
ECHO LV EJECTION FRACTION A2C: 55 %
ECHO LV EJECTION FRACTION A4C: 44 %
ECHO LV GLOBAL LONGITUDINAL STRAIN (GLS): -11.5 %
ECHO LV GLOBAL LONGITUDINAL STRAIN (GLS): -13.2 %
ECHO LV GLOBAL LONGITUDINAL STRAIN (GLS): -13.9 %
ECHO LV GLOBAL LONGITUDINAL STRAIN (GLS): -14.2 %
ECHO LVOT AREA: 3.5 CM2
ECHO LVOT DIAM: 2.1 CM
ECHO LVOT PEAK GRADIENT: 5 MMHG
ECHO LVOT PEAK VELOCITY: 1.1 M/S
ECHO MV A VELOCITY: 0.84 M/S
ECHO MV E VELOCITY: 0.81 M/S
ECHO MV E/A RATIO: 0.96
ECHO RIGHT VENTRICULAR SYSTOLIC PRESSURE (RVSP): 18 MMHG
ECHO RV TAPSE: 1.3 CM (ref 1.7–?)
ECHO TV REGURGITANT MAX VELOCITY: 1.91 M/S
ECHO TV REGURGITANT PEAK GRADIENT: 15 MMHG

## 2024-07-30 PROCEDURE — 93321 DOPPLER ECHO F-UP/LMTD STD: CPT

## 2024-08-05 ENCOUNTER — PATIENT MESSAGE (OUTPATIENT)
Age: 54
End: 2024-08-05

## 2024-08-05 DIAGNOSIS — C50.112 MALIGNANT NEOPLASM OF CENTRAL PORTION OF LEFT BREAST IN FEMALE, ESTROGEN RECEPTOR POSITIVE (HCC): Primary | ICD-10-CM

## 2024-08-05 DIAGNOSIS — Z17.0 MALIGNANT NEOPLASM OF CENTRAL PORTION OF LEFT BREAST IN FEMALE, ESTROGEN RECEPTOR POSITIVE (HCC): Primary | ICD-10-CM

## 2024-08-05 DIAGNOSIS — R93.1 ABNORMAL ECHOCARDIOGRAM: ICD-10-CM

## 2024-08-05 NOTE — TELEPHONE ENCOUNTER
[facility] [place] at [facility]  [phone]    Returned call to patient. Reviewed ECHO results show decrease in EF and GLS. Per Dr. Matt, recommend follow up with cardiology for further evaluation and treatment. She verbalized understanding and had no further questions or concerns.       Signed By: LEI Bruce NP

## 2024-08-06 ENCOUNTER — TELEPHONE (OUTPATIENT)
Age: 54
End: 2024-08-06

## 2024-08-06 NOTE — TELEPHONE ENCOUNTER
Called pt to inform of upcoming appt info    [facility] Cardiology    Dr Jeremy Pérez    9/6/24 1:40 pm - Arrive 10 - 15 Minutes early    [address] [address], [address], [facility] [address] [phone]

## 2024-08-16 ENCOUNTER — TELEPHONE (OUTPATIENT)
Age: 54
End: 2024-08-16

## 2024-08-16 NOTE — TELEPHONE ENCOUNTER
[facility]orentin [facility] Cancer [place] at [facility]  [phone]    08/16/24 11:13 AM EDT - Called patient back to discuss her symptoms. Patient stated that while sitting down outside yesterday after being busy, she started to feel some chest tightness. She stated that it was uncomfortable and slightly painful. She stated that the pain happened when she was taking in a  deep breath and that the pain is on the left side. She started that she took some Aleve and the pain and tightness subsided. She denied any other symptoms. Patient is going to walk to see if it happens again. She will call us back if the pain returns.

## 2024-08-16 NOTE — TELEPHONE ENCOUNTER
Patient called and stated that she was having some chest discomfort last night. She stated that she took an advil which help alleviate some of the pain. Patient requested a call back to discuss.       #[phone]

## 2024-08-26 ENCOUNTER — PATIENT MESSAGE (OUTPATIENT)
Age: 54
End: 2024-08-26

## 2024-08-26 ENCOUNTER — TELEPHONE (OUTPATIENT)
Facility: HOSPITAL | Age: 54
End: 2024-08-26

## 2024-08-26 NOTE — TELEPHONE ENCOUNTER
[facility]  Breast Navigator Encounter    Name:    Madelin Parekh  Age:    54 y.o.  Diagnosis:    LEFT breast cancer    LM that she was having some discomfort/heaviness in the breast.  Lumpectomy was earlier this year.    I called her back today, but she was not available.  I LM for her letting her know that she has follow-up with Dr. Forrest in early October, but she can move the appt up if she wants to be seen sooner.   If she wants a sooner appt, I provided his office number for her to call to get scheduled.                       Lauren Nath RN, BSN, CBCN  Oncology Breast Navigator     [address]  [address]  W: [phone]  F: [phone]  [email or web address]  Good Help to Those in Need®

## 2024-08-27 ENCOUNTER — TELEPHONE (OUTPATIENT)
Age: 54
End: 2024-08-27

## 2024-08-27 NOTE — TELEPHONE ENCOUNTER
Patient called and said that she has noticed since radiation and since she has been trying to start weight lifting again that her affected arm is tighter than the other.  She said her axilla area is tender, but she said that this tightness does not restrict her movement or cause her very much pain.  I suggested stretching.  Patient has an appointment in October but will follow up sooner if the tightness bothers her to discuss possible referral to physical therapy.

## 2024-09-05 ENCOUNTER — OFFICE VISIT (OUTPATIENT)
Age: 54
End: 2024-09-05

## 2024-09-05 VITALS
HEART RATE: 100 BPM | SYSTOLIC BLOOD PRESSURE: 124 MMHG | DIASTOLIC BLOOD PRESSURE: 78 MMHG | HEIGHT: 64 IN | OXYGEN SATURATION: 98 % | WEIGHT: 107.2 LBS | BODY MASS INDEX: 18.3 KG/M2

## 2024-09-05 DIAGNOSIS — R94.31 ABNORMAL EKG: ICD-10-CM

## 2024-09-05 DIAGNOSIS — Z51.81 ENCOUNTER FOR MONITORING CARDIOTOXIC DRUG THERAPY: Primary | ICD-10-CM

## 2024-09-05 DIAGNOSIS — Z79.899 ENCOUNTER FOR MONITORING CARDIOTOXIC DRUG THERAPY: Primary | ICD-10-CM

## 2024-09-05 NOTE — PROGRESS NOTES
Chief Complaint   Patient presents with    ABN EKG     Vitals:    09/05/24 1534   BP: 124/78   Site: Left Upper Arm   Position: Sitting   Pulse: 100   SpO2: 98%   Weight: 48.6 kg (107 lb 3.2 oz)   Height: 1.626 m (5' 4\")       Chest pain NO     ER, urgent care, or hospitalized outside of [facility] since your last visit?  NO     Refills NO   
Received VO from Dr. Jeremy Pérez:    Check Limited Echo for LVEF and GLS for cancer chemo surveillance.                 
well-nourished, in no acute distress  HEENT:  Pink conjunctivae, PERRL, hearing intact to voice, moist mucous membranes  Neck:  Supple, without masses, thyroid non-tender  Resp:  No accessory muscle use, clear breath sounds without wheezes rales or rhonchi  Card:  No murmurs, normal S1, S2 without thrills, bruits or peripheral edema  Abd:  Soft, non-tender, non-distended, normoactive bowel sounds are present, no palpable organomegaly and no detectable hernias  Lymph:  No cervical or inguinal adenopathy  Musc:  No cyanosis or clubbing  Skin:  No rashes or ulcers, skin turgor is good  Neuro:  Cranial nerves are grossly intact, no focal motor weakness, follows commands appropriately  Psych:  Good insight, oriented to person, place and time, alert     Labs:     Lab Results   Component Value Date/Time    WBC 2.7 03/14/2024 09:48 AM    WBCPOC 6.6 10/18/2019 11:30 AM    HGB 11.4 03/14/2024 09:48 AM    HGBPOC 13.9 10/18/2019 11:30 AM    HCT 34.7 03/14/2024 09:48 AM    HCTPOC 42.4 10/18/2019 11:30 AM     03/14/2024 09:48 AM    PLTPOC 293 10/18/2019 11:30 AM    MCV 94.3 03/14/2024 09:48 AM    MCV 90.7 10/18/2019 11:30 AM     Lab Results   Component Value Date/Time     06/04/2024 10:00 AM     10/24/2022 10:17 AM      Lab Results   Component Value Date/Time    CHOL 204 06/04/2024 10:00 AM    HDL 76 06/04/2024 10:00 AM     06/04/2024 10:00 AM     10/24/2022 10:17 AM     Lab Results   Component Value Date/Time    ALT 26 07/16/2024 03:27 PM     03/14/2024 09:48 AM    PLTPOC 293 10/18/2019 11:30 AM     No results found for: \"INR\", \"INREXT\", \"PT1\"   Lab Results   Component Value Date/Time    GFRAA 115 10/22/2021 09:29 AM    BUN 14 07/16/2024 03:27 PM     07/16/2024 03:27 PM    K HEMOLYZED,RECOLLECT REQUESTED 07/16/2024 03:27 PM     07/16/2024 03:27 PM    CO2 29 07/16/2024 03:27 PM     No results found for: \"PSA\", \"PSA2\", \"PSAR1\", \"PSA1\", \"PSA3\"  Lab Results   Component Value

## 2024-09-06 ENCOUNTER — TELEPHONE (OUTPATIENT)
Age: 54
End: 2024-09-06

## 2024-09-06 NOTE — TELEPHONE ENCOUNTER
Patient called in stating that she is having some questions about dates that is on her after visit summary because they are not the same days that  explained to her . She is requesting to please have an call back .     Patient #~ [phone]

## 2024-09-16 ENCOUNTER — OFFICE VISIT (OUTPATIENT)
Age: 54
End: 2024-09-16
Payer: COMMERCIAL

## 2024-09-16 VITALS
HEART RATE: 83 BPM | HEIGHT: 64 IN | DIASTOLIC BLOOD PRESSURE: 83 MMHG | BODY MASS INDEX: 18.37 KG/M2 | OXYGEN SATURATION: 98 % | TEMPERATURE: 98 F | SYSTOLIC BLOOD PRESSURE: 127 MMHG | WEIGHT: 107.6 LBS

## 2024-09-16 DIAGNOSIS — Z17.0 MALIGNANT NEOPLASM OF CENTRAL PORTION OF LEFT BREAST IN FEMALE, ESTROGEN RECEPTOR POSITIVE (HCC): Primary | ICD-10-CM

## 2024-09-16 DIAGNOSIS — C50.112 MALIGNANT NEOPLASM OF CENTRAL PORTION OF LEFT BREAST IN FEMALE, ESTROGEN RECEPTOR POSITIVE (HCC): Primary | ICD-10-CM

## 2024-09-16 PROCEDURE — 99214 OFFICE O/P EST MOD 30 MIN: CPT | Performed by: INTERNAL MEDICINE

## 2024-09-20 ENCOUNTER — HOSPITAL ENCOUNTER (OUTPATIENT)
Facility: HOSPITAL | Age: 54
Discharge: HOME | End: 2024-09-22
Attending: INTERNAL MEDICINE
Payer: COMMERCIAL

## 2024-09-20 VITALS
SYSTOLIC BLOOD PRESSURE: 127 MMHG | DIASTOLIC BLOOD PRESSURE: 83 MMHG | WEIGHT: 107 LBS | HEIGHT: 64 IN | BODY MASS INDEX: 18.27 KG/M2

## 2024-09-20 DIAGNOSIS — Z79.899 ENCOUNTER FOR MONITORING CARDIOTOXIC DRUG THERAPY: ICD-10-CM

## 2024-09-20 DIAGNOSIS — Z51.81 ENCOUNTER FOR MONITORING CARDIOTOXIC DRUG THERAPY: ICD-10-CM

## 2024-09-20 DIAGNOSIS — R94.31 ABNORMAL EKG: ICD-10-CM

## 2024-09-20 LAB
ECHO BSA: 1.48 M2
ECHO LV EDV A2C: 52 ML
ECHO LV EDV A4C: 49 ML
ECHO LV EDV BP: 50 ML (ref 56–104)
ECHO LV EDV INDEX A4C: 33 ML/M2
ECHO LV EDV INDEX BP: 33 ML/M2
ECHO LV EDV NDEX A2C: 35 ML/M2
ECHO LV EJECTION FRACTION A2C: 53 %
ECHO LV EJECTION FRACTION A4C: 74 %
ECHO LV EJECTION FRACTION BIPLANE: 65 % (ref 55–100)
ECHO LV ESV A2C: 24 ML
ECHO LV ESV A4C: 13 ML
ECHO LV ESV BP: 18 ML (ref 19–49)
ECHO LV ESV INDEX A2C: 16 ML/M2
ECHO LV ESV INDEX A4C: 9 ML/M2
ECHO LV ESV INDEX BP: 12 ML/M2
ECHO LV FRACTIONAL SHORTENING: 30 % (ref 28–44)
ECHO LV GLOBAL LONGITUDINAL STRAIN (GLS): -20.9 %
ECHO LV INTERNAL DIMENSION DIASTOLE INDEX: 2.47 CM/M2
ECHO LV INTERNAL DIMENSION DIASTOLIC: 3.7 CM (ref 3.9–5.3)
ECHO LV INTERNAL DIMENSION SYSTOLIC INDEX: 1.73 CM/M2
ECHO LV INTERNAL DIMENSION SYSTOLIC: 2.6 CM
ECHO LV IVSD: 0.7 CM (ref 0.6–0.9)
ECHO LV MASS 2D: 75.4 G (ref 67–162)
ECHO LV MASS INDEX 2D: 50.3 G/M2 (ref 43–95)
ECHO LV POSTERIOR WALL DIASTOLIC: 0.8 CM (ref 0.6–0.9)
ECHO LV RELATIVE WALL THICKNESS RATIO: 0.43

## 2024-09-20 PROCEDURE — 93308 TTE F-UP OR LMTD: CPT

## 2024-09-20 PROCEDURE — 93356 MYOCRD STRAIN IMG SPCKL TRCK: CPT | Performed by: INTERNAL MEDICINE

## 2024-09-20 PROCEDURE — 93308 TTE F-UP OR LMTD: CPT | Performed by: INTERNAL MEDICINE

## 2024-09-30 ENCOUNTER — CLINICAL DOCUMENTATION (OUTPATIENT)
Age: 54
End: 2024-09-30

## 2024-09-30 NOTE — PROGRESS NOTES
Patient had reached out about upcoming imaging post treatment. Patient scheduled for mammogram and US on 10/2/24 and finished XRT on 7/1/24. Per Dr. Forrest pt should wait til December 2024 for treated breast to have mammogram and could do both breasts at that time.  I called and spoke with patient who is requesting information from her insurance on what they will cover for imaging as she is concerned that her other breast has not been screened since 6/2023 and then she will make a decision. Patient has appt in our clinic on 10/4/24 and plans to keep it at this time.

## 2024-10-02 ENCOUNTER — HOSPITAL ENCOUNTER (OUTPATIENT)
Facility: HOSPITAL | Age: 54
Discharge: HOME | End: 2024-10-05
Attending: SURGERY

## 2024-10-02 DIAGNOSIS — Z85.3 HISTORY OF BREAST CANCER: ICD-10-CM

## 2024-10-02 NOTE — PROGRESS NOTES
Ms. Parekh will wait until December to have both breast imaged. She will call to reschedule the right screening if she gets anxious. She will also discuss it with Dr. Forrest 10/4/24.

## 2024-10-03 DIAGNOSIS — Z51.81 ENCOUNTER FOR MONITORING CARDIOTOXIC DRUG THERAPY: Primary | ICD-10-CM

## 2024-10-03 DIAGNOSIS — Z79.899 ENCOUNTER FOR MONITORING CARDIOTOXIC DRUG THERAPY: Primary | ICD-10-CM

## 2024-10-03 NOTE — PROGRESS NOTES
SHARON. received from Dr. LINDA Pérez MD:  Echo LVEF is normal. GLS is normal. But wall motion noted in [place]. Will recommend getting CAC score.   Continue current meds.   Repeat Limited Echo in 3 months

## 2024-10-04 ENCOUNTER — OFFICE VISIT (OUTPATIENT)
Age: 54
End: 2024-10-04
Payer: COMMERCIAL

## 2024-10-04 VITALS — BODY MASS INDEX: 18.27 KG/M2 | HEIGHT: 64 IN | WEIGHT: 107 LBS

## 2024-10-04 DIAGNOSIS — Z85.3 HISTORY OF BREAST CANCER: Primary | ICD-10-CM

## 2024-10-04 PROCEDURE — 99213 OFFICE O/P EST LOW 20 MIN: CPT | Performed by: SURGERY

## 2024-10-04 NOTE — PROGRESS NOTES
HISTORY OF PRESENT ILLNESS  Madelin Parekh is a 54 y.o. female.    HPI ESTABLISHED patient here today for follow up LEFT breast cancer. She is doing well and has no breast concerns currently. She still would like to discuss mastectomies without reconstruction at some point in time. She is very anxious every 6 months having her breast imaging that includes MRI's.       7/18/23: LEFT breast, Stellate Mass, Stereotatic biopsy: invasive and in situ carcinoma, grade 2. Largest tumor focus measured 4mm. No lymphovascular invasion seen. ER+(98%)/HI+(54%)/HER2-, Ki-67(59%), p53,      3/19/24: LEFT breast lumpectomy and LEFT SLNBx + PAC removal. PATH:   - LEFT axilla, sentinel LN, excision: One lymph out of seven nodes identified, positive for metastatic carcinoma (1mi/7).   - LEFT breast lumpectomy: IDC, [place] gr 2, 10 mm in greatest dimension. DCIS present, high nuclear grade, cribriform type. Microcalcifications associated with DCIS. Invasive carcinoma is 0.8 mm from closest (posterior) margin. DCIS is 0.3 mm from closest (posterior) margin. pT1b, pN1mi (sn)  - LEFT axilla, lymph node excision: Four lymph nodes identified, negative for metastatic carcinoma (0/4).       Past Medical History:   Diagnosis Date    Anxiety     Breast cancer (HCC)     Deviated septum     Hx antineoplastic chemo     Hx of blood clots     near port a cath area- eliquis therapy    Invasive ductal carcinoma of breast, female, left (HCC) 07/24/2023 7/18/23: LEFT breast, Stellate Mass, Stereotatic biopsy: invasive and in situ carcinoma, grade 2. Largest tumor focus measured 4mm. No lymphovascular invasion seen. Prognostic markers pending.    Murmur     PONV (postoperative nausea and vomiting)        Past Surgical History:   Procedure Laterality Date    APPENDECTOMY  2000    BREAST LUMPECTOMY Left 03/19/2024    LEFT BREAST LUMPECTOMY WITH ULTRASOUND AND LEFT BREAST SENTINEL NODE BIOPSY, PORTACATH REMOVAL performed by Ton Forrest Jr

## 2024-10-04 NOTE — PROGRESS NOTES
HISTORY OF PRESENT ILLNESS  Madelin Parekh is a 54 y.o. female     HPI ESTABLISHED patient here today for follow up LEFT breast cancer. She is doing well and has no breast concerns currently. She still would like to discuss mastectomies without reconstruction at some point in time. She is very anxious every 6 months having her breast imaging that includes MRI's.    7/18/23: LEFT breast, Stellate Mass, Stereotatic biopsy: invasive and in situ carcinoma, grade 2. Largest tumor focus measured 4mm. No lymphovascular invasion seen. ER+(98%)/IA+(54%)/HER2-, Ki-67(59%), p53,     3/19/24: LEFT breast lumpectomy and LEFT SLNBx + PAC removal. PATH:   - LEFT axilla, sentinel LN, excision: One lymph out of seven nodes identified, positive for metastatic carcinoma (1mi/7).   - LEFT breast lumpectomy: IDC, Echo gr 2, 10 mm in greatest dimension. DCIS present, high nuclear grade, cribriform type. Microcalcifications associated with DCIS. Invasive carcinoma is 0.8 mm from closest (posterior) margin. DCIS is 0.3 mm from closest (posterior) margin. pT1b, pN1mi (sn)  - LEFT axilla, lymph node excision: Four lymph nodes identified, negative for metastatic carcinoma (0/4).     Review of Systems      Physical Exam       ASSESSMENT and PLAN  {Assessment and Plan Chronic Disease:[id number]}

## 2024-10-14 DIAGNOSIS — Z85.3 HISTORY OF BREAST CANCER: Primary | ICD-10-CM

## 2024-10-21 ENCOUNTER — HOSPITAL ENCOUNTER (OUTPATIENT)
Facility: HOSPITAL | Age: 54
Discharge: HOME | End: 2024-10-24
Attending: INTERNAL MEDICINE

## 2024-10-21 DIAGNOSIS — Z51.81 ENCOUNTER FOR MONITORING CARDIOTOXIC DRUG THERAPY: ICD-10-CM

## 2024-10-21 DIAGNOSIS — Z79.899 ENCOUNTER FOR MONITORING CARDIOTOXIC DRUG THERAPY: ICD-10-CM

## 2024-10-21 PROCEDURE — 75571 CT HRT W/O DYE W/CA TEST: CPT

## 2024-10-23 ENCOUNTER — OFFICE VISIT (OUTPATIENT)
Age: 54
End: 2024-10-23
Payer: COMMERCIAL

## 2024-10-23 VITALS — BODY MASS INDEX: 18.27 KG/M2 | WEIGHT: 107 LBS | HEIGHT: 64 IN

## 2024-10-23 DIAGNOSIS — Z85.3 HISTORY OF BREAST CANCER: Primary | ICD-10-CM

## 2024-10-23 PROCEDURE — 99213 OFFICE O/P EST LOW 20 MIN: CPT | Performed by: SURGERY

## 2024-10-23 PROCEDURE — 76642 ULTRASOUND BREAST LIMITED: CPT | Performed by: SURGERY

## 2024-10-23 NOTE — PROGRESS NOTES
HISTORY OF PRESENT ILLNESS  Madelin Parekh is a 54 y.o. female     HPI ESTABLISHED patient here for follow up for ultrasound of her RIGHT breast. Patient states she has not had imaging of RIGHT breast since 2023 and next imaging is not scheduled until January 2025 and she would like an ultrasound to check her RIGHT breast. No breast issues reported.     7/18/23: LEFT breast, Stellate Mass, Stereotatic biopsy: invasive and in situ carcinoma, grade 2. Largest tumor focus measured 4mm. No lymphovascular invasion seen. ER+(98%)/ME+(54%)/HER2-, Ki-67(59%), p53,               - MammaPrint: HIGH risk, Luminal B-Type, -0.160     3/19/24: LEFT breast lumpectomy and LEFT SLNBx + PAC removal. PATH:   - LEFT axilla, sentinel LN, excision: One lymph out of seven nodes, positive for metastatic carcinoma (1mi/7).   - LEFT breast lumpectomy: IDC, No gr 2, 10 mm in greatest dimension. DCIS present, high nuclear grade, cribriform type. Invasive carcinoma is 0.8 mm from closest (posterior) margin. DCIS is 0.3 mm from closest (posterior) margin. pT1b, pN1mi (sn)  - LEFT axilla, lymph node excision: Four lymph nodes identified, negative for metastatic carcinoma (0/4).     Review of Systems      Physical Exam       ASSESSMENT and PLAN  {Assessment and Plan Chronic Disease:[id number]}

## 2024-10-23 NOTE — PROGRESS NOTES
HISTORY OF PRESENT ILLNESS  Madelin Parekh is a 54 y.o. female.    HPI  ESTABLISHED patient here for follow up for ultrasound of her RIGHT breast. Patient states she has not had imaging of RIGHT breast since 2023 and next imaging is not scheduled until January 2025 and she would like an ultrasound to check her RIGHT breast. No breast issues reported.     7/18/23: LEFT breast, Stellate Mass, Stereotatic biopsy: invasive and in situ carcinoma, grade 2. Largest tumor focus measured 4mm. No lymphovascular invasion seen. ER+(98%)/WA+(54%)/HER2-, Ki-67(59%), p53,    - MammaPrint: HIGH risk, Luminal B-Type, -0.160    3/19/24: LEFT breast lumpectomy and LEFT SLNBx + PAC removal. PATH:   - LEFT axilla, sentinel LN, excision: One lymph out of seven nodes, positive for metastatic carcinoma (1mi/7).   - LEFT breast lumpectomy: IDC, [place] gr 2, 10 mm in greatest dimension. DCIS present, high nuclear grade, cribriform type. Invasive carcinoma is 0.8 mm from closest (posterior) margin. DCIS is 0.3 mm from closest (posterior) margin. pT1b, pN1mi (sn)  - LEFT axilla, lymph node excision: Four lymph nodes identified, negative for metastatic carcinoma (0/4).         Past Medical History:   Diagnosis Date    Anxiety     Breast cancer (HCC)     Deviated septum     Hx antineoplastic chemo     Hx of blood clots     near port a cath area- eliquis therapy    Invasive ductal carcinoma of breast, female, left (HCC) 07/24/2023 7/18/23: LEFT breast, Stellate Mass, Stereotatic biopsy: invasive and in situ carcinoma, grade 2. Largest tumor focus measured 4mm. No lymphovascular invasion seen. Prognostic markers pending.    Murmur     PONV (postoperative nausea and vomiting)        Past Surgical History:   Procedure Laterality Date    APPENDECTOMY  2000    BREAST LUMPECTOMY Left 03/19/2024    LEFT BREAST LUMPECTOMY WITH ULTRASOUND AND LEFT BREAST SENTINEL NODE BIOPSY, PORTACATH REMOVAL performed by Ton Forrest Jr, MD at [facility]

## 2024-10-23 NOTE — PROGRESS NOTES
HISTORY OF PRESENT ILLNESS  Madelin Parekh is a 54 y.o. female     HPI ESTABLISHED Patient here for follow up       7/18/23: LEFT breast, Stellate Mass, Stereotatic biopsy: invasive and in situ carcinoma, grade 2. Largest tumor focus measured 4mm. No lymphovascular invasion seen. ER+(98%)/NE+(54%)/HER2-, Ki-67(59%), p53,               - MammaPrint: HIGH risk, Luminal B-Type, -0.160     3/19/24: LEFT breast lumpectomy and LEFT SLNBx + PAC removal. PATH:   - LEFT axilla, sentinel LN, excision: One lymph out of seven nodes, positive for metastatic carcinoma (1mi/7).   - LEFT breast lumpectomy: IDC, [place] gr 2, 10 mm in greatest dimension. DCIS present, high nuclear grade, cribriform type. Invasive carcinoma is 0.8 mm from closest (posterior) margin. DCIS is 0.3 mm from closest (posterior) margin. pT1b, pN1mi (sn)  - LEFT axilla, lymph node excision: Four lymph nodes identified, negative for metastatic carcinoma (0/4).       Review of Systems      Physical Exam       ASSESSMENT and PLAN  {Assessment and Plan Chronic Disease:[id number]}

## 2024-10-24 ENCOUNTER — TELEPHONE (OUTPATIENT)
Age: 54
End: 2024-10-24

## 2024-10-24 NOTE — TELEPHONE ENCOUNTER
Patient is calling to find out her results from her Calcium Score Scan test that the doctor had ordered.    [phone]

## 2024-10-30 ENCOUNTER — TELEPHONE (OUTPATIENT)
Age: 54
End: 2024-10-30

## 2024-10-30 NOTE — TELEPHONE ENCOUNTER
Patient request a call back from nurse, patient didn't state her concern.    # [phone]    ###############################  V.O. received from Dr. LINDA Pérez MD:  Start Lipitor 20mg po daily.          ###############################Unable to reach pt. Message left with detailed CAC and medication instructions  Also sent a Mychart message

## 2024-10-31 ENCOUNTER — TELEPHONE (OUTPATIENT)
Age: 54
End: 2024-10-31

## 2024-10-31 NOTE — TELEPHONE ENCOUNTER
Patient called and stated that she would like a call back to discuss some questions she has regarding her CT scan.     # [phone]

## 2024-10-31 NOTE — TELEPHONE ENCOUNTER
Spoke with patient to change the location of her zometa infusion on 1/14/25, also advised her for any scheduling needs to call our office, she had no further questions.

## 2024-10-31 NOTE — TELEPHONE ENCOUNTER
[facility]orentin [facility] [place] at [facility]  [phone]    Returned call to patient. Reviewed that LUQ lung opacitiy is most likely due to radiation changes and not concerning at this time. Will obtain follow up imaging for pulmonary nodule in March 2025. She had no further questions or concerns.       Signed By: LEI Bruce NP

## 2024-11-04 ENCOUNTER — TELEPHONE (OUTPATIENT)
Age: 54
End: 2024-11-04

## 2024-11-04 NOTE — TELEPHONE ENCOUNTER
Spoke with patient to make sure her appointments were adjusted according and she stated they were, she had no further questions.

## 2024-11-05 DIAGNOSIS — R93.1 AGATSTON CAC SCORE, <100: Primary | ICD-10-CM

## 2024-11-05 RX ORDER — ATORVASTATIN CALCIUM 20 MG/1
20 TABLET, FILM COATED ORAL DAILY
Qty: 90 TABLET | Refills: 3 | Status: SHIPPED | OUTPATIENT
Start: 2024-11-05

## 2024-11-05 NOTE — TELEPHONE ENCOUNTER
Refill per VO of Dr. Jeremy Pérez:    9/5/2024    Future Appointments   Date Time Provider Department Center   12/6/2024  1:30 PM [facility] ECHO 2 [facility] BS AMB   1/2/2025  9:05 AM [facility] 5 [facility]   1/2/2025  9:30 AM [facility] US 2 [facility]   1/6/2025  8:30 AM [facility] Camp [facility]   1/14/2025 11:00 AM  [facility]   1/14/2025 11:30 AM Chintan Matt MD ON[facility] BS AMB       Requested Prescriptions     Pending Prescriptions Disp Refills    atorvastatin (LIPITOR) 20 MG tablet 90 tablet 3     Sig: Take 1 tablet by mouth daily

## 2024-11-11 DIAGNOSIS — M79.89 SWELLING OF LEFT UPPER EXTREMITY: Primary | ICD-10-CM

## 2024-11-12 DIAGNOSIS — I89.0 LYMPHEDEMA OF LEFT ARM: Primary | ICD-10-CM

## 2024-11-18 ENCOUNTER — HOSPITAL ENCOUNTER (OUTPATIENT)
Facility: HOSPITAL | Age: 54
Setting detail: RECURRING SERIES
Discharge: HOME | End: 2024-11-21
Payer: COMMERCIAL

## 2024-11-18 VITALS — WEIGHT: 107.6 LBS | HEIGHT: 64 IN | BODY MASS INDEX: 18.37 KG/M2

## 2024-11-18 PROCEDURE — 97760 ORTHOTIC MGMT&TRAING 1ST ENC: CPT

## 2024-11-18 PROCEDURE — 97161 PT EVAL LOW COMPLEX 20 MIN: CPT

## 2024-11-18 PROCEDURE — 97110 THERAPEUTIC EXERCISES: CPT

## 2024-11-18 PROCEDURE — 97535 SELF CARE MNGMENT TRAINING: CPT

## 2024-11-18 NOTE — THERAPY EVALUATION
benefit from compression garments for wear with high risk activities and education in lymphedema management techniques.  Patient will benefit from complete decongestive therapy (CDT) including manual lymphatic drainage (MLD) technique, short-stretch textile bandages/compression system to decongest limb, kinesiotape as needed, and review of self lymphedema management techniques as appropriate.       Evaluation Complexity:  History:  MEDIUM  Complexity : 1-2 comorbidities / personal factors will impact the outcome/ POC ; Examination:  LOW Complexity : 1-2 Standardized tests and measures addressing body structure, function, activity limitation and / or participation in recreation  ;Presentation:  LOW Complexity : Stable, uncomplicated  ;Clinical Decision Making:  LOW Complexity : FOTO score of  Overall Complexity Rating: LOW   Problem List: pain affecting function, decrease ROM, and edema affection function   Treatment Plan may include any combination of the followin Therapeutic Exercise, 44968 Manual Therapy, 97609 Self Care/Home Management, 22417 Vasopneumatic Device  (Vasopnuematic compression justification:  Per bilateral girth measures taken and listed above the edema is considered significant and having an impact on the patient's self care and ADL's), 15607 Orthotic Management and Training, and 50022 Orthotic Management and Training, Subsequent  Patient / Family readiness to learn indicated by: asking questions, interest, and return verbalization   Persons(s) to be included in education: patient (P)  Barriers to Learning/Limitations: none  Measures taken if barriers to learning present: not applicable  Patient Self Reported Health Status: good  Rehabilitation Potential: good    Short Term Goals: To be accomplished in 4  treatments  Patient and/or caregiver will verbalize 3/3 signs and symptoms of infection without external cueing, in order to reduce the risk of infection and skin impairment and to

## 2024-11-26 ENCOUNTER — CLINICAL DOCUMENTATION (OUTPATIENT)
Age: 54
End: 2024-11-26

## 2024-12-18 ENCOUNTER — TELEPHONE (OUTPATIENT)
Age: 54
End: 2024-12-18

## 2024-12-18 NOTE — TELEPHONE ENCOUNTER
Dr. Reid's office called to request most recent medical records. Faxed ov note, surgical pathology. To [phone]

## 2025-01-02 ENCOUNTER — APPOINTMENT (OUTPATIENT)
Facility: HOSPITAL | Age: 55
End: 2025-01-02
Attending: INTERNAL MEDICINE
Payer: COMMERCIAL

## 2025-01-02 ENCOUNTER — HOSPITAL ENCOUNTER (OUTPATIENT)
Facility: HOSPITAL | Age: 55
End: 2025-01-02
Attending: SURGERY
Payer: COMMERCIAL

## 2025-01-02 ENCOUNTER — HOSPITAL ENCOUNTER (OUTPATIENT)
Facility: HOSPITAL | Age: 55
Discharge: HOME | End: 2025-01-02
Attending: SURGERY
Payer: COMMERCIAL

## 2025-01-02 VITALS — BODY MASS INDEX: 18.27 KG/M2 | HEIGHT: 64 IN | WEIGHT: 107 LBS

## 2025-01-02 PROCEDURE — G0279 TOMOSYNTHESIS, MAMMO: HCPCS

## 2025-01-08 ENCOUNTER — OFFICE VISIT (OUTPATIENT)
Age: 55
End: 2025-01-08
Payer: COMMERCIAL

## 2025-01-08 VITALS — HEIGHT: 64 IN | BODY MASS INDEX: 18.27 KG/M2 | WEIGHT: 107 LBS

## 2025-01-08 DIAGNOSIS — Z85.3 HISTORY OF BREAST CANCER: Primary | ICD-10-CM

## 2025-01-08 DIAGNOSIS — Z91.89 AT HIGH RISK FOR BREAST CANCER: ICD-10-CM

## 2025-01-08 PROCEDURE — 76642 ULTRASOUND BREAST LIMITED: CPT | Performed by: SURGERY

## 2025-01-08 PROCEDURE — 99213 OFFICE O/P EST LOW 20 MIN: CPT | Performed by: SURGERY

## 2025-01-08 NOTE — PROGRESS NOTES
HISTORY OF PRESENT ILLNESS  Madelin Parekh is a 54 y.o. female     HPI ESTABLISHED patient here for 6 month follow up visit pertaining to LEFT breast cancer. Pt feels great no concerns , denies pain. Would like US for reassurance due to cancer hx and dense breasts.       7/18/23: LEFT breast, Stellate Mass, Stereotatic biopsy: invasive and in situ carcinoma, grade 2. Largest tumor focus measured 4mm. No lymphovascular invasion seen. ER+(98%)/WI+(54%)/HER2-, Ki-67(59%), p53,               - MammaPrint: HIGH risk, Luminal B-Type, -0.160     3/19/24: LEFT breast lumpectomy and LEFT SLNBx + PAC removal. PATH:   - LEFT axilla, sentinel LN, excision: One lymph out of seven nodes identified, positive for metastatic carcinoma (1mi/7).   - LEFT breast lumpectomy: IDC, Sibley gr 2, 10 mm in greatest dimension. DCIS present, high nuclear grade, cribriform type. Microcalcifications associated with DCIS. Invasive carcinoma is 0.8 mm from closest (posterior) margin. DCIS is 0.3 mm from closest (posterior) margin. pT1b, pN1mi (sn)  - LEFT axilla, lymph node excision: Four lymph nodes identified, negative for metastatic carcinoma (0/4).        Review of Systems      Physical Exam       ASSESSMENT and PLAN  {Assessment and Plan Chronic Disease:[id number]}

## 2025-01-08 NOTE — PROGRESS NOTES
HISTORY OF PRESENT ILLNESS  Madelin Parekh is a 54 y.o. female.    HPI ESTABLISHED patient here for 6 month follow up visit pertaining to LEFT breast cancer. Pt feels great no concerns , denies pain. Would like US for reassurance due to cancer hx and dense breasts.      7/18/23: LEFT breast, Stellate Mass, Stereotatic biopsy: invasive and in situ carcinoma, grade 2. Largest tumor focus measured 4mm. No lymphovascular invasion seen. ER+(98%)/KS+(54%)/HER2-, Ki-67(59%), p53,               - MammaPrint: HIGH risk, Luminal B-Type, -0.160     3/19/24: LEFT breast lumpectomy and LEFT SLNBx + PAC removal. PATH:   - LEFT axilla, sentinel LN, excision: One lymph out of seven nodes identified, positive for metastatic carcinoma (1mi/7).   - LEFT breast lumpectomy: IDC, No gr 2, 10 mm in greatest dimension. DCIS present, high nuclear grade, cribriform type. Microcalcifications associated with DCIS. Invasive carcinoma is 0.8 mm from closest (posterior) margin. DCIS is 0.3 mm from closest (posterior) margin. pT1b, pN1mi (sn)  - LEFT axilla, lymph node excision: Four lymph nodes identified, negative for metastatic carcinoma (0/4).      Past Medical History:   Diagnosis Date    Anxiety     Breast cancer (HCC)     Deviated septum     History of therapeutic radiation     Hx antineoplastic chemo     Hx of blood clots     near port a cath area- eliquis therapy    Invasive ductal carcinoma of breast, female, left (HCC) 07/24/2023 7/18/23: LEFT breast, Stellate Mass, Stereotatic biopsy: invasive and in situ carcinoma, grade 2. Largest tumor focus measured 4mm. No lymphovascular invasion seen. Prognostic markers pending.    Murmur     PONV (postoperative nausea and vomiting)        Past Surgical History:   Procedure Laterality Date    APPENDECTOMY  2000    BREAST LUMPECTOMY Left 03/19/2024    LEFT BREAST LUMPECTOMY WITH ULTRASOUND AND LEFT BREAST SENTINEL NODE BIOPSY, PORTACATH REMOVAL performed by Ton Forrest Jr, MD

## 2025-01-14 ENCOUNTER — APPOINTMENT (OUTPATIENT)
Facility: HOSPITAL | Age: 55
End: 2025-01-14
Payer: COMMERCIAL

## 2025-01-14 ENCOUNTER — HOSPITAL ENCOUNTER (OUTPATIENT)
Facility: HOSPITAL | Age: 55
Setting detail: INFUSION SERIES
Discharge: HOME | End: 2025-01-14
Payer: COMMERCIAL

## 2025-01-14 ENCOUNTER — OFFICE VISIT (OUTPATIENT)
Age: 55
End: 2025-01-14
Payer: COMMERCIAL

## 2025-01-14 ENCOUNTER — CLINICAL DOCUMENTATION (OUTPATIENT)
Age: 55
End: 2025-01-14

## 2025-01-14 VITALS
OXYGEN SATURATION: 100 % | DIASTOLIC BLOOD PRESSURE: 67 MMHG | HEIGHT: 64 IN | TEMPERATURE: 97.6 F | WEIGHT: 108 LBS | HEART RATE: 80 BPM | SYSTOLIC BLOOD PRESSURE: 123 MMHG | BODY MASS INDEX: 18.44 KG/M2 | RESPIRATION RATE: 17 BRPM

## 2025-01-14 VITALS
BODY MASS INDEX: 18.44 KG/M2 | HEIGHT: 64 IN | WEIGHT: 108 LBS | HEART RATE: 72 BPM | SYSTOLIC BLOOD PRESSURE: 102 MMHG | DIASTOLIC BLOOD PRESSURE: 62 MMHG | TEMPERATURE: 97.8 F | OXYGEN SATURATION: 99 % | RESPIRATION RATE: 18 BRPM

## 2025-01-14 DIAGNOSIS — Z17.0 MALIGNANT NEOPLASM OF CENTRAL PORTION OF LEFT BREAST IN FEMALE, ESTROGEN RECEPTOR POSITIVE (HCC): Primary | ICD-10-CM

## 2025-01-14 DIAGNOSIS — C50.112 MALIGNANT NEOPLASM OF CENTRAL PORTION OF LEFT BREAST IN FEMALE, ESTROGEN RECEPTOR POSITIVE (HCC): Primary | ICD-10-CM

## 2025-01-14 DIAGNOSIS — D70.1 CHEMOTHERAPY INDUCED NEUTROPENIA (HCC): ICD-10-CM

## 2025-01-14 DIAGNOSIS — M85.89 OSTEOPENIA OF MULTIPLE SITES: Primary | ICD-10-CM

## 2025-01-14 DIAGNOSIS — Z17.0 MALIGNANT NEOPLASM OF UPPER-OUTER QUADRANT OF LEFT BREAST IN FEMALE, ESTROGEN RECEPTOR POSITIVE (HCC): ICD-10-CM

## 2025-01-14 DIAGNOSIS — C50.412 MALIGNANT NEOPLASM OF UPPER-OUTER QUADRANT OF LEFT BREAST IN FEMALE, ESTROGEN RECEPTOR POSITIVE (HCC): ICD-10-CM

## 2025-01-14 DIAGNOSIS — T45.1X5A CHEMOTHERAPY INDUCED NEUTROPENIA (HCC): ICD-10-CM

## 2025-01-14 DIAGNOSIS — Z51.81 ENCOUNTER FOR THERAPEUTIC DRUG MONITORING: Primary | ICD-10-CM

## 2025-01-14 LAB
ANION GAP BLD CALC-SCNC: 9.2 MMOL/L (ref 10–20)
CA-I BLD-MCNC: 1.22 MMOL/L (ref 1.15–1.33)
CHLORIDE BLD-SCNC: 102 MMOL/L (ref 98–107)
CO2 BLD-SCNC: 29.8 MMOL/L (ref 21–32)
CREAT BLD-MCNC: 0.59 MG/DL (ref 0.6–1.3)
GLUCOSE BLD-MCNC: 93 MG/DL (ref 74–99)
POTASSIUM BLD-SCNC: 3.7 MMOL/L (ref 3.5–5.1)
SERVICE CMNT-IMP: ABNORMAL
SODIUM BLD-SCNC: 141 MMOL/L (ref 136–145)

## 2025-01-14 PROCEDURE — 80047 BASIC METABLC PNL IONIZED CA: CPT

## 2025-01-14 PROCEDURE — 99215 OFFICE O/P EST HI 40 MIN: CPT | Performed by: INTERNAL MEDICINE

## 2025-01-14 PROCEDURE — 6360000002 HC RX W HCPCS: Performed by: NURSE PRACTITIONER

## 2025-01-14 PROCEDURE — 96365 THER/PROPH/DIAG IV INF INIT: CPT

## 2025-01-14 RX ORDER — SODIUM CHLORIDE 9 MG/ML
5-250 INJECTION, SOLUTION INTRAVENOUS PRN
OUTPATIENT
Start: 2025-07-13

## 2025-01-14 RX ORDER — ONDANSETRON 8 MG/1
8 TABLET, FILM COATED ORAL EVERY 8 HOURS PRN
Qty: 60 TABLET | Refills: 3 | Status: SHIPPED | OUTPATIENT
Start: 2025-01-14

## 2025-01-14 RX ORDER — HYDROCORTISONE SODIUM SUCCINATE 100 MG/2ML
100 INJECTION INTRAMUSCULAR; INTRAVENOUS
OUTPATIENT
Start: 2025-07-13

## 2025-01-14 RX ORDER — ACETAMINOPHEN 325 MG/1
650 TABLET ORAL
OUTPATIENT
Start: 2025-07-13

## 2025-01-14 RX ORDER — DIPHENHYDRAMINE HYDROCHLORIDE 50 MG/ML
50 INJECTION INTRAMUSCULAR; INTRAVENOUS
Status: CANCELLED | OUTPATIENT
Start: 2025-02-04

## 2025-01-14 RX ORDER — ALBUTEROL SULFATE 90 UG/1
4 INHALANT RESPIRATORY (INHALATION) PRN
Status: CANCELLED | OUTPATIENT
Start: 2025-02-04

## 2025-01-14 RX ORDER — SODIUM CHLORIDE 9 MG/ML
25 INJECTION, SOLUTION INTRAVENOUS PRN
Status: CANCELLED | OUTPATIENT
Start: 2025-02-04

## 2025-01-14 RX ORDER — ALBUTEROL SULFATE 90 UG/1
4 INHALANT RESPIRATORY (INHALATION) PRN
OUTPATIENT
Start: 2025-07-13

## 2025-01-14 RX ORDER — ACETAMINOPHEN 325 MG/1
650 TABLET ORAL
Status: CANCELLED | OUTPATIENT
Start: 2025-02-04

## 2025-01-14 RX ORDER — SODIUM CHLORIDE 0.9 % (FLUSH) 0.9 %
5-40 SYRINGE (ML) INJECTION PRN
OUTPATIENT
Start: 2025-07-13

## 2025-01-14 RX ORDER — SODIUM CHLORIDE 0.9 % (FLUSH) 0.9 %
5-40 SYRINGE (ML) INJECTION PRN
Status: CANCELLED | OUTPATIENT
Start: 2025-02-04

## 2025-01-14 RX ORDER — EPINEPHRINE 1 MG/ML
0.3 INJECTION, SOLUTION, CONCENTRATE INTRAVENOUS PRN
Status: CANCELLED | OUTPATIENT
Start: 2025-02-04

## 2025-01-14 RX ORDER — ONDANSETRON 2 MG/ML
8 INJECTION INTRAMUSCULAR; INTRAVENOUS
Status: CANCELLED | OUTPATIENT
Start: 2025-02-04

## 2025-01-14 RX ORDER — ONDANSETRON 2 MG/ML
8 INJECTION INTRAMUSCULAR; INTRAVENOUS
OUTPATIENT
Start: 2025-07-13

## 2025-01-14 RX ORDER — HEPARIN 100 UNIT/ML
500 SYRINGE INTRAVENOUS PRN
OUTPATIENT
Start: 2025-07-13

## 2025-01-14 RX ORDER — ZOLEDRONIC ACID 0.04 MG/ML
4 INJECTION, SOLUTION INTRAVENOUS ONCE
OUTPATIENT
Start: 2025-07-13 | End: 2025-07-13

## 2025-01-14 RX ORDER — ZOLEDRONIC ACID 0.04 MG/ML
4 INJECTION, SOLUTION INTRAVENOUS ONCE
Status: COMPLETED | OUTPATIENT
Start: 2025-01-14 | End: 2025-01-14

## 2025-01-14 RX ORDER — FAMOTIDINE 10 MG/ML
20 INJECTION, SOLUTION INTRAVENOUS
OUTPATIENT
Start: 2025-07-13

## 2025-01-14 RX ORDER — SODIUM CHLORIDE 9 MG/ML
INJECTION, SOLUTION INTRAVENOUS CONTINUOUS
Status: CANCELLED | OUTPATIENT
Start: 2025-02-04

## 2025-01-14 RX ORDER — EPINEPHRINE 1 MG/ML
0.3 INJECTION, SOLUTION INTRAMUSCULAR; SUBCUTANEOUS PRN
OUTPATIENT
Start: 2025-07-13

## 2025-01-14 RX ORDER — FAMOTIDINE 10 MG/ML
20 INJECTION, SOLUTION INTRAVENOUS
Status: CANCELLED | OUTPATIENT
Start: 2025-02-04

## 2025-01-14 RX ORDER — DIPHENHYDRAMINE HYDROCHLORIDE 50 MG/ML
50 INJECTION INTRAMUSCULAR; INTRAVENOUS
OUTPATIENT
Start: 2025-07-13

## 2025-01-14 RX ORDER — SODIUM CHLORIDE 9 MG/ML
INJECTION, SOLUTION INTRAVENOUS CONTINUOUS
OUTPATIENT
Start: 2025-07-13

## 2025-01-14 RX ORDER — HEPARIN SODIUM (PORCINE) LOCK FLUSH IV SOLN 100 UNIT/ML 100 UNIT/ML
500 SOLUTION INTRAVENOUS PRN
Status: CANCELLED | OUTPATIENT
Start: 2025-02-04

## 2025-01-14 RX ORDER — HYDROCORTISONE SODIUM SUCCINATE 100 MG/2ML
100 INJECTION INTRAMUSCULAR; INTRAVENOUS
Status: CANCELLED | OUTPATIENT
Start: 2025-02-04

## 2025-01-14 RX ADMIN — ZOLEDRONIC ACID 4 MG: 0.04 INJECTION, SOLUTION INTRAVENOUS at 12:28

## 2025-01-14 ASSESSMENT — PAIN SCALES - GENERAL: PAINLEVEL_OUTOF10: 0

## 2025-01-14 ASSESSMENT — PATIENT HEALTH QUESTIONNAIRE - PHQ9
SUM OF ALL RESPONSES TO PHQ QUESTIONS 1-9: 0
1. LITTLE INTEREST OR PLEASURE IN DOING THINGS: NOT AT ALL
SUM OF ALL RESPONSES TO PHQ QUESTIONS 1-9: 0
SUM OF ALL RESPONSES TO PHQ9 QUESTIONS 1 & 2: 0
2. FEELING DOWN, DEPRESSED OR HOPELESS: NOT AT ALL

## 2025-01-14 NOTE — PROGRESS NOTES
Outpatient Infusion Center Progress Note        Date: 25    Name: Madelin Parekh    MRN: [id number]         : 1970    MD: Chintan Matt MD       Ms. Parekh admitted to \[facility]\"" for Zometa ambulatory in stable condition. Assessment completed, no acute issues at this time. No new concerns voiced. Patient denies jaw pain and/or swelling; denies increased pain in the hips, groin, and thigh. Patient denies recent and upcoming invasive dental work.  24 gauge peripheral IV obtained in the [facility] without difficulty, line flushed and capped. Labs drawn peripherally and sent for processing.    Patient proceeded to appointment with Dr. Matt's team.    ** Patient has received this medication in the past. Patient reports no previous reactions to the medication(s).      Vitals:    25 1058 25 1257   BP: 123/67 102/62   Pulse: 80 72   Resp: 16 18   Temp: 97.6 °F (36.4 °C) 97.8 °F (36.6 °C)   TempSrc: Temporal Temporal   SpO2: 100% 99%   Weight: 49 kg (108 lb)    Height: 1.626 m (5' 4\")          Labs were obtained and reviewed. Criteria met for treatment today.    Results for orders placed or performed during the hospital encounter of 25   POC CHEM 8   Result Value Ref Range    POC Ionized Calcium 1.22 1.15 - 1.33 mmol/L    POC Sodium 141 136 - 145 mmol/L    POC Potassium 3.7 3.5 - 5.1 mmol/L    POC Chloride 102 98 - 107 mmol/L    POC TCO2 29.8 21 - 32 mmol/L    Anion Gap, POC 9.2 (L) 10 - 20 mmol/L    POC Glucose 93 74 - 99 mg/dL    POC Creatinine 0.59 (L) 0.6 - 1.3 mg/dL    eGFR, POC >90 >60 ml/min/1.73m2    UA Comment Comment Not Indicated.               Medications:  MEDICATIONS GIVEN:  Medications Administered         zoledronic acid (ZOMETA) 4 mg/100 mL infusion Admin Date  2025 Action  New Bag Dose  4 mg Rate  300 mL/hr Route  IntraVENous Documented By  Sherwin Javier RN                Post-Infusion Vitals:  Vitals:    25 1257   BP: 102/62   Pulse: 72   Resp: 18   Temp: 97.8 °F  (36.6 °C)   SpO2: 99%           Pt tolerated treatment well, no adverse reactions noted. PIV maintained positive blood return throughout treatment, flushed with positive blood return at conclusion and removed and wrapped in coban per protocol. D/c home ambulatory in no distress. Patient is aware of next appointment on 2/11/2025 @ 1100 at the [facility] location.        Future Appointments:  Future Appointments   Date Time Provider Department Center   1/16/2025  3:30 PM [facility]   2/11/2025 11:00 AM SS LAB CHAIR 1 [facility]   2/11/2025 11:30 AM Chintan Matt MD [facility] BS [facility]   7/15/2025 11:30 AM SS FASTTRACK 2 [facility]

## 2025-01-14 NOTE — PROGRESS NOTES
[facility] [place] [facility]  [address], [address] [place] [address]  W: [phone]  F: [phone]      Reason for Visit:   Madelin Parekh is a 54 y.o. female who is seen in follow up for breast cancer.    Breast Surgeon: Dr. Forrest  Rad onc:  Dr. Camp    Treatment History:   7/18/23 left breast stellate mass, core bx:  IDC, gr 2, 4 mm, ER + at 98%, ME + at 54%, HER 2 negative at IHC 1+ (SOHA ration 1; sig/cell 1.85), ki67 59%, DCIS, no LVI  Mammaprint shows luminal B, high risk, index -0.160  8/1/23 left axilla LN core bx:  + for breast cancer  Genetic testing , negative BRCA 1/2 by [employer] 2021  NSABP FB-12  AC 9/20/23 - 11/1/23  Paclitaxel/trastuzumab/pertuzumab 11/15/23-2/27/24  Held taxol due to ANC 0.9 on c1d1  Held taxol due to ANC 0.7 on C2d1  c3d8 skipped for neutropenia  C3d15 skipped for neutropenia   taxol at c4d1 to 65 mg/m2  3/19/24 left breast lumpectomy:  gr 1, 1 cm, DCIS, gr 3, cribriform, + LVI, 1/11 LN + with 0.6 mm micromet, ypT1b ogH4mui cM0  XRT 5/14/24-7/1/24  Anastrozole 7/8/24-    History of Present Illness:   An abnormal mammogram led to the pathology above.      Interval history:  Patient presents today for follow up and treatment. Reports gr 1 hot flashes, gr 1 insomnia, gr 1 mouth sores, gr 1 tachycardia, gr 1 headache, gr 1 urinary leakage, gr 1 vaginal dryness    Right thumb stiffness    FH:  mother with breast cancer, dx at age 83; maternal aunt with breast cancer, dx 50-60s and lung cancer; maternal aunt:  ovarian cancer, dx 40s; father with colon cancer dx 60s    Lmp 8/2023    Past Medical History:   Diagnosis Date    Anxiety     Breast cancer (HCC)     Deviated septum     History of therapeutic radiation     Hx antineoplastic chemo     Hx of blood clots     near port a cath area- eliquis therapy    Invasive ductal carcinoma of breast, female, left (HCC) 07/24/2023 7/18/23: LEFT breast, Stellate Mass, Stereotatic biopsy: invasive

## 2025-01-14 NOTE — PROGRESS NOTES
Madelin Parekh is a 54 y.o. female is here today for a breast cancer follow up.    1. Have you been to the ER, urgent care clinic since your last visit?  Hospitalized since your last visit?No    2. Have you seen or consulted any other health care providers outside of the [facility] since your last visit?  Include any pap smears or colon screening. No       1/14/2025  10:58 AM   Vitals    SYSTOLIC 123    DIASTOLIC 67    Site    Position    Cuff Size    Pulse 80    Temp 97.6 °F (36.4 °C)    Respirations    SpO2 100 %    Weight - Scale 108 lb    Height 5' 4\"    Body Mass Index 18.54 kg/m2    Pain Score    Pain Level 0

## 2025-01-14 NOTE — PROGRESS NOTES
Oral Chemotherapy     Madelin Parekh is a 54 y.o.female seen for consultation for pharmacist oral chemotherapy management.     Diagnosis: breast cancer    Medication name: ribociclib (Kisqali) 200 mg tabs  Regimen: two tabs (400 mg total) by mouth every morning for 21 days, then take 7 days off  Ordering provider: Dr Matt  Start date: on delivery     An overview was given of the role of the pharmacist in their care. Dose, schedule, and mechanism of action of the prescribed therapy were discussed in detail. Some of the side effects discussed include, but are not limited to, anemia, thrombocytopenia, nausea/vomiting, hair loss, fatigue, diarrhea, skin rash, hepatotoxicity, Qtc prolongation, and pulmonary toxicity. Proper storage and handling instructions were also discussed. Patient demonstrated comprehension and understanding throughout the education session. A packet containing the information was provided to the patient.    Prescribed medication was reviewed for appropriate indication and dosing. Medication list was reviewed for potential drug interactions. Ribociclib may increase the serum concentration of atorvastatin. Ms Parekh is on 20 mg daily, so this interaction should avoid risk of toxicity. Patient reports that she sparingly uses zolpidem.     Chemotherapy/Immunotherapy education and consent discussed with the patient and the patient denied any questions or concerns.  A hard copy of the chemotherapy consent was offered to the patient and the patient declined (uploaded via [employer]).    Ms. Parekh verbalized understanding of the information presented and all of the patient's questions were answered.     Yari Leslie, MartyD, BCPS

## 2025-01-16 ENCOUNTER — HOSPITAL ENCOUNTER (OUTPATIENT)
Facility: HOSPITAL | Age: 55
Discharge: HOME | End: 2025-01-19

## 2025-01-16 ENCOUNTER — PATIENT MESSAGE (OUTPATIENT)
Age: 55
End: 2025-01-16

## 2025-01-16 VITALS
DIASTOLIC BLOOD PRESSURE: 70 MMHG | HEART RATE: 91 BPM | SYSTOLIC BLOOD PRESSURE: 118 MMHG | BODY MASS INDEX: 18.44 KG/M2 | HEIGHT: 64 IN | WEIGHT: 108 LBS

## 2025-01-16 RX ORDER — CHLORAL HYDRATE 500 MG
CAPSULE ORAL DAILY
COMMUNITY

## 2025-01-16 ASSESSMENT — PAIN SCALES - GENERAL: PAINLEVEL_OUTOF10: 0

## 2025-01-17 DIAGNOSIS — Z85.3 HISTORY OF BREAST CANCER: ICD-10-CM

## 2025-01-17 DIAGNOSIS — Z91.89 AT HIGH RISK FOR BREAST CANCER: Primary | ICD-10-CM

## 2025-01-24 ENCOUNTER — HOSPITAL ENCOUNTER (OUTPATIENT)
Facility: HOSPITAL | Age: 55
Discharge: HOME | End: 2025-01-26
Attending: INTERNAL MEDICINE
Payer: COMMERCIAL

## 2025-01-24 VITALS
BODY MASS INDEX: 18.44 KG/M2 | WEIGHT: 108 LBS | HEIGHT: 64 IN | SYSTOLIC BLOOD PRESSURE: 117 MMHG | DIASTOLIC BLOOD PRESSURE: 76 MMHG

## 2025-01-24 DIAGNOSIS — Z51.81 ENCOUNTER FOR MONITORING CARDIOTOXIC DRUG THERAPY: ICD-10-CM

## 2025-01-24 DIAGNOSIS — Z79.899 ENCOUNTER FOR MONITORING CARDIOTOXIC DRUG THERAPY: ICD-10-CM

## 2025-01-24 LAB
ECHO AO ASC DIAM: 2.6 CM
ECHO AO ASCENDING AORTA INDEX: 1.72 CM/M2
ECHO AO ROOT DIAM: 2.7 CM
ECHO AO ROOT INDEX: 1.79 CM/M2
ECHO BSA: 1.49 M2
ECHO LA DIAMETER INDEX: 1.59 CM/M2
ECHO LA DIAMETER: 2.4 CM
ECHO LA TO AORTIC ROOT RATIO: 0.89
ECHO LV EDV A2C: 54 ML
ECHO LV EDV A4C: 59 ML
ECHO LV EDV BP: 59 ML (ref 56–104)
ECHO LV EDV INDEX A4C: 39 ML/M2
ECHO LV EDV INDEX BP: 39 ML/M2
ECHO LV EDV NDEX A2C: 36 ML/M2
ECHO LV EJECTION FRACTION A2C: 56 %
ECHO LV EJECTION FRACTION A4C: 65 %
ECHO LV EJECTION FRACTION BIPLANE: 62 % (ref 55–100)
ECHO LV ESV A2C: 24 ML
ECHO LV ESV A4C: 21 ML
ECHO LV ESV BP: 22 ML (ref 19–49)
ECHO LV ESV INDEX A2C: 16 ML/M2
ECHO LV ESV INDEX A4C: 14 ML/M2
ECHO LV ESV INDEX BP: 15 ML/M2
ECHO LV FRACTIONAL SHORTENING: 33 % (ref 28–44)
ECHO LV GLOBAL LONGITUDINAL STRAIN (GLS): -20.7 %
ECHO LV INTERNAL DIMENSION DIASTOLE INDEX: 2.85 CM/M2
ECHO LV INTERNAL DIMENSION DIASTOLIC: 4.3 CM (ref 3.9–5.3)
ECHO LV INTERNAL DIMENSION SYSTOLIC INDEX: 1.92 CM/M2
ECHO LV INTERNAL DIMENSION SYSTOLIC: 2.9 CM
ECHO LV IVSD: 0.5 CM (ref 0.6–0.9)
ECHO LV MASS 2D: 58.3 G (ref 67–162)
ECHO LV MASS INDEX 2D: 38.6 G/M2 (ref 43–95)
ECHO LV POSTERIOR WALL DIASTOLIC: 0.5 CM (ref 0.6–0.9)
ECHO LV RELATIVE WALL THICKNESS RATIO: 0.23
ECHO LVOT AREA: 3.5 CM2
ECHO LVOT DIAM: 2.1 CM

## 2025-01-24 PROCEDURE — 93308 TTE F-UP OR LMTD: CPT | Performed by: INTERNAL MEDICINE

## 2025-01-24 PROCEDURE — 93356 MYOCRD STRAIN IMG SPCKL TRCK: CPT | Performed by: INTERNAL MEDICINE

## 2025-01-24 PROCEDURE — 93308 TTE F-UP OR LMTD: CPT

## 2025-02-04 ENCOUNTER — OFFICE VISIT (OUTPATIENT)
Age: 55
End: 2025-02-04
Payer: COMMERCIAL

## 2025-02-04 ENCOUNTER — HOSPITAL ENCOUNTER (OUTPATIENT)
Facility: HOSPITAL | Age: 55
Setting detail: INFUSION SERIES
Discharge: HOME | End: 2025-02-04
Payer: COMMERCIAL

## 2025-02-04 VITALS
DIASTOLIC BLOOD PRESSURE: 66 MMHG | WEIGHT: 109.4 LBS | HEART RATE: 78 BPM | SYSTOLIC BLOOD PRESSURE: 125 MMHG | TEMPERATURE: 97.8 F | OXYGEN SATURATION: 99 % | BODY MASS INDEX: 18.78 KG/M2 | RESPIRATION RATE: 18 BRPM

## 2025-02-04 VITALS
WEIGHT: 109 LBS | BODY MASS INDEX: 18.61 KG/M2 | SYSTOLIC BLOOD PRESSURE: 113 MMHG | DIASTOLIC BLOOD PRESSURE: 70 MMHG | TEMPERATURE: 97.8 F | RESPIRATION RATE: 18 BRPM | HEIGHT: 64 IN | OXYGEN SATURATION: 100 % | HEART RATE: 75 BPM

## 2025-02-04 DIAGNOSIS — C50.912 INVASIVE DUCTAL CARCINOMA OF BREAST, FEMALE, LEFT (HCC): ICD-10-CM

## 2025-02-04 DIAGNOSIS — Z17.0 MALIGNANT NEOPLASM OF CENTRAL PORTION OF LEFT BREAST IN FEMALE, ESTROGEN RECEPTOR POSITIVE (HCC): Primary | ICD-10-CM

## 2025-02-04 DIAGNOSIS — C50.112 MALIGNANT NEOPLASM OF CENTRAL PORTION OF LEFT BREAST IN FEMALE, ESTROGEN RECEPTOR POSITIVE (HCC): Primary | ICD-10-CM

## 2025-02-04 DIAGNOSIS — Z51.81 ENCOUNTER FOR THERAPEUTIC DRUG MONITORING: Primary | ICD-10-CM

## 2025-02-04 LAB
ALBUMIN SERPL-MCNC: 3.9 G/DL (ref 3.5–5)
ALBUMIN/GLOB SERPL: 1.1 (ref 1.1–2.2)
ALP SERPL-CCNC: 52 U/L (ref 45–117)
ALT SERPL-CCNC: 22 U/L (ref 12–78)
ANION GAP SERPL CALC-SCNC: 2 MMOL/L (ref 2–12)
AST SERPL-CCNC: 20 U/L (ref 15–37)
BASOPHILS # BLD: 0.02 K/UL (ref 0–0.1)
BASOPHILS NFR BLD: 1 % (ref 0–1)
BILIRUB SERPL-MCNC: 0.7 MG/DL (ref 0.2–1)
BUN SERPL-MCNC: 18 MG/DL (ref 6–20)
BUN/CREAT SERPL: 23 (ref 12–20)
CALCIUM SERPL-MCNC: 9.5 MG/DL (ref 8.5–10.1)
CHLORIDE SERPL-SCNC: 105 MMOL/L (ref 97–108)
CO2 SERPL-SCNC: 32 MMOL/L (ref 21–32)
CREAT SERPL-MCNC: 0.79 MG/DL (ref 0.55–1.02)
DIFFERENTIAL METHOD BLD: ABNORMAL
EOSINOPHIL # BLD: 0.06 K/UL (ref 0–0.4)
EOSINOPHIL NFR BLD: 3 % (ref 0–7)
ERYTHROCYTE [DISTWIDTH] IN BLOOD BY AUTOMATED COUNT: 12.1 % (ref 11.5–14.5)
GLOBULIN SER CALC-MCNC: 3.4 G/DL (ref 2–4)
GLUCOSE SERPL-MCNC: 98 MG/DL (ref 65–100)
HCT VFR BLD AUTO: 35.5 % (ref 35–47)
HGB BLD-MCNC: 12.3 G/DL (ref 11.5–16)
IMM GRANULOCYTES # BLD AUTO: 0 K/UL (ref 0–0.04)
IMM GRANULOCYTES NFR BLD AUTO: 0 % (ref 0–0.5)
LYMPHOCYTES # BLD: 0.58 K/UL (ref 0.8–3.5)
LYMPHOCYTES NFR BLD: 29 % (ref 12–49)
MAGNESIUM SERPL-MCNC: 2 MG/DL (ref 1.6–2.4)
MCH RBC QN AUTO: 31.9 PG (ref 26–34)
MCHC RBC AUTO-ENTMCNC: 34.6 G/DL (ref 30–36.5)
MCV RBC AUTO: 92.2 FL (ref 80–99)
MONOCYTES # BLD: 0.2 K/UL (ref 0–1)
MONOCYTES NFR BLD: 10 % (ref 5–13)
NEUTS SEG # BLD: 1.14 K/UL (ref 1.8–8)
NEUTS SEG NFR BLD: 57 % (ref 32–75)
NRBC # BLD: 0 K/UL (ref 0–0.01)
NRBC BLD-RTO: 0 PER 100 WBC
PHOSPHATE SERPL-MCNC: 2.8 MG/DL (ref 2.6–4.7)
PLATELET # BLD AUTO: 121 K/UL (ref 150–400)
PMV BLD AUTO: 8.6 FL (ref 8.9–12.9)
POTASSIUM SERPL-SCNC: 3.3 MMOL/L (ref 3.5–5.1)
PROT SERPL-MCNC: 7.3 G/DL (ref 6.4–8.2)
RBC # BLD AUTO: 3.85 M/UL (ref 3.8–5.2)
RBC MORPH BLD: ABNORMAL
SODIUM SERPL-SCNC: 139 MMOL/L (ref 136–145)
WBC # BLD AUTO: 2 K/UL (ref 3.6–11)

## 2025-02-04 PROCEDURE — 80053 COMPREHEN METABOLIC PANEL: CPT

## 2025-02-04 PROCEDURE — 83735 ASSAY OF MAGNESIUM: CPT

## 2025-02-04 PROCEDURE — 85025 COMPLETE CBC W/AUTO DIFF WBC: CPT

## 2025-02-04 PROCEDURE — 99215 OFFICE O/P EST HI 40 MIN: CPT | Performed by: INTERNAL MEDICINE

## 2025-02-04 PROCEDURE — 84100 ASSAY OF PHOSPHORUS: CPT

## 2025-02-04 ASSESSMENT — PATIENT HEALTH QUESTIONNAIRE - PHQ9
SUM OF ALL RESPONSES TO PHQ QUESTIONS 1-9: 0
SUM OF ALL RESPONSES TO PHQ9 QUESTIONS 1 & 2: 0
SUM OF ALL RESPONSES TO PHQ QUESTIONS 1-9: 0
1. LITTLE INTEREST OR PLEASURE IN DOING THINGS: NOT AT ALL
2. FEELING DOWN, DEPRESSED OR HOPELESS: NOT AT ALL
SUM OF ALL RESPONSES TO PHQ QUESTIONS 1-9: 0
SUM OF ALL RESPONSES TO PHQ QUESTIONS 1-9: 0

## 2025-02-04 NOTE — PROGRESS NOTES
[facility] [place] at [facility]  [address], [address] [facility] [address]  W: [phone]  F: [phone]      Reason for Visit:   Madelin Parekh is a 54 y.o. female who is seen in follow up for breast cancer.    Breast Surgeon: Dr. Forrest  Rad onc:  Dr. Camp    Treatment History:   7/18/23 left breast stellate mass, core bx:  IDC, gr 2, 4 mm, ER + at 98%, NJ + at 54%, HER 2 negative at IHC 1+ (SOHA ration 1; sig/cell 1.85), ki67 59%, DCIS, no LVI  Mammaprint shows luminal B, high risk, index -0.160  8/1/23 left axilla LN core bx:  + for breast cancer  Genetic testing , negative BRCA 1/2 by [employer] 2021  NSABP FB-12  AC 9/20/23 - 11/1/23  Paclitaxel/trastuzumab/pertuzumab 11/15/23-2/27/24  Held taxol due to ANC 0.9 on c1d1  Held taxol due to ANC 0.7 on C2d1  c3d8 skipped for neutropenia  C3d15 skipped for neutropenia   taxol at c4d1 to 65 mg/m2  3/19/24 left breast lumpectomy:  gr 1, 1 cm, DCIS, gr 3, cribriform, + LVI, 1/11 LN + with 0.6 mm micromet, ypT1b crQ2ito cM0  XRT 5/14/24-7/1/24  Anastrozole 7/8/24-  Ribociclib 1/21/25-    History of Present Illness:   An abnormal mammogram led to the pathology above.      Interval history:  Patient presents today for follow up and treatment. Reports gr 1 fatigue, gr 1 hot flashes, gr 1 insomnia, gr 1 sob, gr 1 vaginal dryness    Right thumb stiffness    FH:  mother with breast cancer, dx at age 83; maternal aunt with breast cancer, dx 50-60s and lung cancer; maternal aunt:  ovarian cancer, dx 40s; father with colon cancer dx 60s    Lmp 8/2023    Past Medical History:   Diagnosis Date    Anxiety     Breast cancer (HCC)     Deviated septum     History of therapeutic radiation     Hx antineoplastic chemo     Hx of blood clots     near port a cath area- eliquis therapy    Invasive ductal carcinoma of breast, female, left (HCC) 07/24/2023 7/18/23: LEFT breast, Stellate Mass, Stereotatic biopsy: invasive and in situ carcinoma, grade

## 2025-02-04 NOTE — PROGRESS NOTES
125/66   Madelin Parekh is a 54 y.o. female is here today for a breast cancer follow up.    1. Have you been to the ER, urgent care clinic since your last visit?  Hospitalized since your last visit?No    2. Have you seen or consulted any other health care providers outside of the [facility] since your last visit?  Include any pap smears or colon screening. No       2/4/2025  9:15 AM   Vitals    SYSTOLIC 125    DIASTOLIC 66    Site    Position    Cuff Size    Pulse 78    Temp 97.8 °F (36.6 °C)    Respirations 18    SpO2 99 %    Weight - Scale 109 lb 6.4 oz    Height

## 2025-02-10 ENCOUNTER — HOSPITAL ENCOUNTER (OUTPATIENT)
Facility: HOSPITAL | Age: 55
Discharge: HOME | End: 2025-02-13
Attending: SURGERY
Payer: COMMERCIAL

## 2025-02-10 DIAGNOSIS — Z85.3 HISTORY OF BREAST CANCER: ICD-10-CM

## 2025-02-10 DIAGNOSIS — Z91.89 AT HIGH RISK FOR BREAST CANCER: ICD-10-CM

## 2025-02-10 PROCEDURE — C8908 MRI W/O FOL W/CONT, BREAST,: HCPCS

## 2025-02-10 PROCEDURE — 6360000004 HC RX CONTRAST MEDICATION: Performed by: SURGERY

## 2025-02-10 PROCEDURE — A9585 GADOBUTROL INJECTION: HCPCS | Performed by: SURGERY

## 2025-02-10 RX ORDER — GADOBUTROL 604.72 MG/ML
5 INJECTION INTRAVENOUS
Status: COMPLETED | OUTPATIENT
Start: 2025-02-10 | End: 2025-02-10

## 2025-02-10 RX ADMIN — GADOBUTROL 5 ML: 604.72 INJECTION INTRAVENOUS at 09:55

## 2025-02-11 ENCOUNTER — HOSPITAL ENCOUNTER (OUTPATIENT)
Facility: HOSPITAL | Age: 55
Setting detail: RECURRING SERIES
Discharge: HOME | End: 2025-02-14
Payer: COMMERCIAL

## 2025-02-11 ENCOUNTER — APPOINTMENT (OUTPATIENT)
Facility: HOSPITAL | Age: 55
End: 2025-02-11
Payer: COMMERCIAL

## 2025-02-11 ENCOUNTER — TELEPHONE (OUTPATIENT)
Age: 55
End: 2025-02-11

## 2025-02-11 PROCEDURE — 97535 SELF CARE MNGMENT TRAINING: CPT

## 2025-02-11 PROCEDURE — 97763 ORTHC/PROSTC MGMT SBSQ ENC: CPT

## 2025-02-11 NOTE — PROGRESS NOTES
[facility]  a part of [facility]   [address], [address]  [place] [address]  Phone: [phone]  Fax: [phone]    PHYSICAL THERAPY/OCCUPATIONAL THERAPY PROGRESS NOTE  Patient Name:  Madelin Parekh :  1970   Treatment/Medical Diagnosis: No admission diagnoses are documented for this encounter.   Referral Source:  Lon Ferreira, Ton DILLARD, *     Date of Initial Visit:  2024 Attended Visits:  2 Missed Visits:  0     SUMMARY OF TREATMENT/ASSESSMENT:    Patient returns for first treatment visit today after having finally received her compression garments last week.  She was educated in all aspects of compression garments use.  Garments fit adequately today.  Glove fits well but sleeve is slightly loose at the wrist.  With glove overlap at the wrist compression is achieved is appropriate.   Reviewed skin care.  Patient will benefit from additional session to educate in remedial exercises and manual lymph drainage techniques.  Time did not permit for these activities today.     Short term goals 1 and 3 are met  and long term goals 1 and 2 are met:  All other short and long term goals are in progress     Patient will continue to benefit from skilled PT / OT services to address ROM deficits, address swelling, analyze compression product fit and use, instruct in home lymphedema management program, and measure for compression products to address functional deficits and attain remaining goals.      CURRENT STATUS/GOALS  Short Term Goals: To be accomplished in 4  treatments  Patient and/or caregiver will verbalize 3/3 signs and symptoms of infection without external cueing, in order to reduce the risk of infection and skin impairment and to promote optimal self management of condition.      Status at last Eval/Progress Note: Initiated  Current Status:  met  Goal Met?   yes    Patient to perform 5/5 lymphedema remedial exercises in session with modified independence

## 2025-02-11 NOTE — PROGRESS NOTES
PHYSICAL THERAPY/OCCUPATIONAL THERAPY - DAILY TREATMENT NOTE (updated 3/23)      Date: 2025          Patient Name:  Madelin Parekh :  1970   Medical   Diagnosis:  No admission diagnoses are documented for this encounter. Treatment Diagnosis:  I89.0     Lymphedema, not elsewhere classified    Referral Source:  Ton Forrest Jr, * Insurance:   Payor: [facility] / Plan: [facility] / Product Type: *No Product type* /                     Patient  verified yes     Visit #   Current  / Total 2 6   Time   In / Out [id number]   Total Treatment Time 55   Total Timed Codes 55         SUBJECTIVE    Pain Level (0-10 scale): 0 out of 10 numeric scale    Any medication changes, allergies to medications, adverse drug reactions, diagnosis change, or new procedure performed?: [x] No    [] Yes (see summary sheet for update)  Medications: Verified on Patient Summary List    Subjective functional status/changes:     I got my garments last week and I really wanted you to get a chance to check them for me and learn how to use them.  I am exercising 3 days a week and I am moving better.      OBJECTIVE      Therapeutic Procedures:  Tx Min Billable or 1:1 Min (if diff from Tx Min) Procedure, Rationale, Specifics   45 [phone] [id number] Orthotic/Prosthetic Management and/or Training UE, LE and/or trunk, subsequent orthotic(s)/prosthetic(s) encounter (timed): modification, fitting adjustments and additional training to improve positioning of lower extremity during weight bearing and gait, improve pressure distribution of the plantar aspect of the foot, improve upper extremity performance, improve postural stability to improve patient's ability to progress to PLOF and address remaining functional goals.    Details if applicable:    Upper/Lower Extremity Compression: Fitted for the following compression products:     UE: Left Glove and Arm Sleeve     Style: Circular knit     Brand: Juzo     Type: Non-Custom: Size: 1 long 20-30mmHG

## 2025-02-17 ENCOUNTER — OFFICE VISIT (OUTPATIENT)
Age: 55
End: 2025-02-17
Payer: COMMERCIAL

## 2025-02-17 ENCOUNTER — HOSPITAL ENCOUNTER (OUTPATIENT)
Facility: HOSPITAL | Age: 55
Setting detail: INFUSION SERIES
Discharge: HOME | End: 2025-02-17
Payer: COMMERCIAL

## 2025-02-17 VITALS
BODY MASS INDEX: 18.61 KG/M2 | RESPIRATION RATE: 17 BRPM | HEIGHT: 64 IN | OXYGEN SATURATION: 99 % | HEART RATE: 81 BPM | WEIGHT: 109 LBS | TEMPERATURE: 97.9 F | SYSTOLIC BLOOD PRESSURE: 111 MMHG | DIASTOLIC BLOOD PRESSURE: 65 MMHG

## 2025-02-17 VITALS
SYSTOLIC BLOOD PRESSURE: 111 MMHG | HEART RATE: 81 BPM | OXYGEN SATURATION: 99 % | DIASTOLIC BLOOD PRESSURE: 65 MMHG | TEMPERATURE: 97.7 F

## 2025-02-17 DIAGNOSIS — Z51.11 ENCOUNTER FOR ANTINEOPLASTIC CHEMOTHERAPY: ICD-10-CM

## 2025-02-17 DIAGNOSIS — C50.912 INVASIVE DUCTAL CARCINOMA OF BREAST, FEMALE, LEFT (HCC): ICD-10-CM

## 2025-02-17 DIAGNOSIS — Z51.81 ENCOUNTER FOR THERAPEUTIC DRUG MONITORING: Primary | ICD-10-CM

## 2025-02-17 DIAGNOSIS — Z17.0 MALIGNANT NEOPLASM OF CENTRAL PORTION OF LEFT BREAST IN FEMALE, ESTROGEN RECEPTOR POSITIVE (HCC): Primary | ICD-10-CM

## 2025-02-17 DIAGNOSIS — C50.112 MALIGNANT NEOPLASM OF CENTRAL PORTION OF LEFT BREAST IN FEMALE, ESTROGEN RECEPTOR POSITIVE (HCC): Primary | ICD-10-CM

## 2025-02-17 LAB
ALBUMIN SERPL-MCNC: 4.1 G/DL (ref 3.5–5)
ALBUMIN/GLOB SERPL: 1.2 (ref 1.1–2.2)
ALP SERPL-CCNC: 55 U/L (ref 45–117)
ALT SERPL-CCNC: 22 U/L (ref 12–78)
ANION GAP SERPL CALC-SCNC: 4 MMOL/L (ref 2–12)
AST SERPL-CCNC: 24 U/L (ref 15–37)
BASOPHILS # BLD: 0.03 K/UL (ref 0–0.1)
BASOPHILS NFR BLD: 2 % (ref 0–1)
BILIRUB SERPL-MCNC: 0.6 MG/DL (ref 0.2–1)
BUN SERPL-MCNC: 23 MG/DL (ref 6–20)
BUN/CREAT SERPL: 34 (ref 12–20)
CALCIUM SERPL-MCNC: 10.1 MG/DL (ref 8.5–10.1)
CHLORIDE SERPL-SCNC: 106 MMOL/L (ref 97–108)
CO2 SERPL-SCNC: 30 MMOL/L (ref 21–32)
CREAT SERPL-MCNC: 0.68 MG/DL (ref 0.55–1.02)
DIFFERENTIAL METHOD BLD: ABNORMAL
EOSINOPHIL # BLD: 0.02 K/UL (ref 0–0.4)
EOSINOPHIL NFR BLD: 1 % (ref 0–7)
ERYTHROCYTE [DISTWIDTH] IN BLOOD BY AUTOMATED COUNT: 13.6 % (ref 11.5–14.5)
GLOBULIN SER CALC-MCNC: 3.4 G/DL (ref 2–4)
GLUCOSE SERPL-MCNC: 83 MG/DL (ref 65–100)
HCT VFR BLD AUTO: 37.7 % (ref 35–47)
HGB BLD-MCNC: 13 G/DL (ref 11.5–16)
IMM GRANULOCYTES # BLD AUTO: 0 K/UL (ref 0–0.04)
IMM GRANULOCYTES NFR BLD AUTO: 0 % (ref 0–0.5)
LYMPHOCYTES # BLD: 0.76 K/UL (ref 0.8–3.5)
LYMPHOCYTES NFR BLD: 45 % (ref 12–49)
MAGNESIUM SERPL-MCNC: 2.3 MG/DL (ref 1.6–2.4)
MCH RBC QN AUTO: 32 PG (ref 26–34)
MCHC RBC AUTO-ENTMCNC: 34.5 G/DL (ref 30–36.5)
MCV RBC AUTO: 92.9 FL (ref 80–99)
MONOCYTES # BLD: 0.29 K/UL (ref 0–1)
MONOCYTES NFR BLD: 17 % (ref 5–13)
NEUTS BAND NFR BLD MANUAL: 1 %
NEUTS SEG # BLD: 0.6 K/UL (ref 1.8–8)
NEUTS SEG NFR BLD: 34 % (ref 32–75)
NRBC # BLD: 0 K/UL (ref 0–0.01)
NRBC BLD-RTO: 0 PER 100 WBC
PHOSPHATE SERPL-MCNC: 2.8 MG/DL (ref 2.6–4.7)
PLATELET # BLD AUTO: 140 K/UL (ref 150–400)
PMV BLD AUTO: 8.8 FL (ref 8.9–12.9)
POTASSIUM SERPL-SCNC: 4.1 MMOL/L (ref 3.5–5.1)
PROT SERPL-MCNC: 7.5 G/DL (ref 6.4–8.2)
RBC # BLD AUTO: 4.06 M/UL (ref 3.8–5.2)
RBC MORPH BLD: ABNORMAL
SODIUM SERPL-SCNC: 140 MMOL/L (ref 136–145)
WBC # BLD AUTO: 1.7 K/UL (ref 3.6–11)
WBC MORPH BLD: ABNORMAL

## 2025-02-17 PROCEDURE — 84100 ASSAY OF PHOSPHORUS: CPT

## 2025-02-17 PROCEDURE — 83735 ASSAY OF MAGNESIUM: CPT

## 2025-02-17 PROCEDURE — 99215 OFFICE O/P EST HI 40 MIN: CPT | Performed by: NURSE PRACTITIONER

## 2025-02-17 PROCEDURE — 36415 COLL VENOUS BLD VENIPUNCTURE: CPT

## 2025-02-17 PROCEDURE — 85025 COMPLETE CBC W/AUTO DIFF WBC: CPT

## 2025-02-17 PROCEDURE — 80053 COMPREHEN METABOLIC PANEL: CPT

## 2025-02-17 ASSESSMENT — PATIENT HEALTH QUESTIONNAIRE - PHQ9
2. FEELING DOWN, DEPRESSED OR HOPELESS: NOT AT ALL
SUM OF ALL RESPONSES TO PHQ QUESTIONS 1-9: 0
SUM OF ALL RESPONSES TO PHQ9 QUESTIONS 1 & 2: 0
SUM OF ALL RESPONSES TO PHQ QUESTIONS 1-9: 0
SUM OF ALL RESPONSES TO PHQ QUESTIONS 1-9: 0
1. LITTLE INTEREST OR PLEASURE IN DOING THINGS: NOT AT ALL
SUM OF ALL RESPONSES TO PHQ QUESTIONS 1-9: 0

## 2025-02-17 NOTE — PROGRESS NOTES
Madelin Parekh is a 55 y.o. female is here today for a breast cancer follow up.    1. Have you been to the ER, urgent care clinic since your last visit?  Hospitalized since your last visit?No    2. Have you seen or consulted any other health care providers outside of the [facility] since your last visit?  Include any pap smears or colon screening. No    
time    4. Pulmonary nodule: 3 mm nodule to RUL seen on CT 9/19/23. repeated CT Chest after completion of chemotherapy. Ordered, on 3/15/24.  Unlikely of clinical significance.  No evidence of metastatic disease; will repeat in march, ordered.     5. Monitoring cardiotoxic medication: Baseline TTE 9/6/23 EF 64%. Will repeat every 12 weeks while undergoing HER-2 therapy. TTE 1/2/24 EF 65%; TTE EF 7/30/24 50-55%, saw cardiology, repeat TTE 1/24/25 EF 62%    6. Right Subclavian DVT: seen on doppler 10/12/23, due to port. Started on Eliquis. Port out, stopped eliquis    7. Insomnia:  trazodone 50 mg as needed, prescribed by PCP. She has improvement with THC gummies.     8. Weight loss:  109 lbs today, will monitor; improved    9. Osteopenia:  6/11/24 dexa    We discussed the data from Juany et al, [facility] 2013, for the meta-analysis for adjuvant bisphosphonate use in breast cancer.  We discussed that in postmenopausal women, it appears that the bisphosphate zolendronic acid, given as in ABCSG 12 at 4 mg IV q 6 months x 3 years, is beneficial in improving bone DFS and OS.  We discussed side effects such as ONJ and the need to avoid invasive dental procedures while on these medications, renal damage, and hypocalcemia.    After this discussion, she is agreeable to zometa, #1 7/16/24; #2 1/14/25.    10. Joint pain:  due to AI; discussed tart cherry juice and omega 3 FA 3.3 g/day    11. Hypokalemia: will increase dietary potassium    12. Neutropenia:  due to ribo, CBC pending.     13. Thrombocytopenia:  due to ribo, CBC pending.     ADDENDUM: ANC 0.6, will HOLD ribociclib and repeat in 1 week. Will see back in 5 weeks.     Signed By: LEI Bruce NP      No follow-ups on file.

## 2025-02-24 ENCOUNTER — TELEPHONE (OUTPATIENT)
Age: 55
End: 2025-02-24

## 2025-02-24 ENCOUNTER — HOSPITAL ENCOUNTER (OUTPATIENT)
Facility: HOSPITAL | Age: 55
Setting detail: INFUSION SERIES
Discharge: HOME | End: 2025-02-24
Payer: COMMERCIAL

## 2025-02-24 VITALS
DIASTOLIC BLOOD PRESSURE: 63 MMHG | SYSTOLIC BLOOD PRESSURE: 106 MMHG | TEMPERATURE: 98 F | OXYGEN SATURATION: 99 % | HEART RATE: 73 BPM | RESPIRATION RATE: 16 BRPM

## 2025-02-24 DIAGNOSIS — Z51.81 ENCOUNTER FOR THERAPEUTIC DRUG MONITORING: Primary | ICD-10-CM

## 2025-02-24 DIAGNOSIS — C50.912 INVASIVE DUCTAL CARCINOMA OF BREAST, FEMALE, LEFT (HCC): ICD-10-CM

## 2025-02-24 LAB
ALBUMIN SERPL-MCNC: 4 G/DL (ref 3.5–5)
ALBUMIN/GLOB SERPL: 1.2 (ref 1.1–2.2)
ALP SERPL-CCNC: 52 U/L (ref 45–117)
ALT SERPL-CCNC: 26 U/L (ref 12–78)
ANION GAP SERPL CALC-SCNC: 4 MMOL/L (ref 2–12)
AST SERPL-CCNC: 23 U/L (ref 15–37)
BASOPHILS # BLD: 0.03 K/UL (ref 0–0.1)
BASOPHILS NFR BLD: 1.6 % (ref 0–1)
BILIRUB SERPL-MCNC: 0.8 MG/DL (ref 0.2–1)
BUN SERPL-MCNC: 21 MG/DL (ref 6–20)
BUN/CREAT SERPL: 31 (ref 12–20)
CALCIUM SERPL-MCNC: 9.7 MG/DL (ref 8.5–10.1)
CHLORIDE SERPL-SCNC: 106 MMOL/L (ref 97–108)
CO2 SERPL-SCNC: 29 MMOL/L (ref 21–32)
CREAT SERPL-MCNC: 0.67 MG/DL (ref 0.55–1.02)
DIFFERENTIAL METHOD BLD: ABNORMAL
EOSINOPHIL # BLD: 0.03 K/UL (ref 0–0.4)
EOSINOPHIL NFR BLD: 1.6 % (ref 0–7)
ERYTHROCYTE [DISTWIDTH] IN BLOOD BY AUTOMATED COUNT: 13.8 % (ref 11.5–14.5)
GLOBULIN SER CALC-MCNC: 3.3 G/DL (ref 2–4)
GLUCOSE SERPL-MCNC: 84 MG/DL (ref 65–100)
HCT VFR BLD AUTO: 36.6 % (ref 35–47)
HGB BLD-MCNC: 12.6 G/DL (ref 11.5–16)
IMM GRANULOCYTES # BLD AUTO: 0 K/UL (ref 0–0.04)
IMM GRANULOCYTES NFR BLD AUTO: 0 % (ref 0–0.5)
LYMPHOCYTES # BLD: 0.59 K/UL (ref 0.8–3.5)
LYMPHOCYTES NFR BLD: 30.9 % (ref 12–49)
MAGNESIUM SERPL-MCNC: 2.2 MG/DL (ref 1.6–2.4)
MCH RBC QN AUTO: 31.8 PG (ref 26–34)
MCHC RBC AUTO-ENTMCNC: 34.4 G/DL (ref 30–36.5)
MCV RBC AUTO: 92.4 FL (ref 80–99)
MONOCYTES # BLD: 0.27 K/UL (ref 0–1)
MONOCYTES NFR BLD: 14.1 % (ref 5–13)
NEUTS SEG # BLD: 0.98 K/UL (ref 1.8–8)
NEUTS SEG NFR BLD: 51.8 % (ref 32–75)
NRBC # BLD: 0 K/UL (ref 0–0.01)
NRBC BLD-RTO: 0 PER 100 WBC
PHOSPHATE SERPL-MCNC: 2.9 MG/DL (ref 2.6–4.7)
PLATELET # BLD AUTO: 182 K/UL (ref 150–400)
PMV BLD AUTO: 8.5 FL (ref 8.9–12.9)
POTASSIUM SERPL-SCNC: 3.9 MMOL/L (ref 3.5–5.1)
PROT SERPL-MCNC: 7.3 G/DL (ref 6.4–8.2)
RBC # BLD AUTO: 3.96 M/UL (ref 3.8–5.2)
RBC MORPH BLD: ABNORMAL
SODIUM SERPL-SCNC: 139 MMOL/L (ref 136–145)
WBC # BLD AUTO: 1.9 K/UL (ref 3.6–11)

## 2025-02-24 PROCEDURE — 80053 COMPREHEN METABOLIC PANEL: CPT

## 2025-02-24 PROCEDURE — 85025 COMPLETE CBC W/AUTO DIFF WBC: CPT

## 2025-02-24 PROCEDURE — 83735 ASSAY OF MAGNESIUM: CPT

## 2025-02-24 PROCEDURE — 84100 ASSAY OF PHOSPHORUS: CPT

## 2025-02-24 NOTE — TELEPHONE ENCOUNTER
[facility] [place] [facility]  [phone]    2/24/25 0925 Call placed to patient to notify that per BARBER Cabral she is to hold the Ribociclib for 1 more week and repeat labs. Patient stated understanding and has no further questions at this time.

## 2025-02-25 ENCOUNTER — APPOINTMENT (OUTPATIENT)
Facility: HOSPITAL | Age: 55
End: 2025-02-25
Payer: COMMERCIAL

## 2025-02-27 ENCOUNTER — OFFICE VISIT (OUTPATIENT)
Age: 55
End: 2025-02-27
Payer: COMMERCIAL

## 2025-02-27 VITALS
HEART RATE: 69 BPM | SYSTOLIC BLOOD PRESSURE: 98 MMHG | DIASTOLIC BLOOD PRESSURE: 68 MMHG | BODY MASS INDEX: 18.61 KG/M2 | OXYGEN SATURATION: 97 % | WEIGHT: 109 LBS | HEIGHT: 64 IN

## 2025-02-27 DIAGNOSIS — C50.912 INVASIVE DUCTAL CARCINOMA OF BREAST, FEMALE, LEFT (HCC): ICD-10-CM

## 2025-02-27 DIAGNOSIS — Z79.899 ENCOUNTER FOR MONITORING CARDIOTOXIC DRUG THERAPY: ICD-10-CM

## 2025-02-27 DIAGNOSIS — R93.1 AGATSTON CAC SCORE, <100: Primary | ICD-10-CM

## 2025-02-27 DIAGNOSIS — Z51.81 ENCOUNTER FOR MONITORING CARDIOTOXIC DRUG THERAPY: ICD-10-CM

## 2025-02-27 PROCEDURE — 99204 OFFICE O/P NEW MOD 45 MIN: CPT | Performed by: INTERNAL MEDICINE

## 2025-02-27 RX ORDER — ATORVASTATIN CALCIUM 10 MG/1
10 TABLET, FILM COATED ORAL DAILY
Qty: 90 TABLET | Refills: 3 | Status: SHIPPED | OUTPATIENT
Start: 2025-02-27

## 2025-02-27 ASSESSMENT — PATIENT HEALTH QUESTIONNAIRE - PHQ9
SUM OF ALL RESPONSES TO PHQ QUESTIONS 1-9: 0
SUM OF ALL RESPONSES TO PHQ QUESTIONS 1-9: 0
SUM OF ALL RESPONSES TO PHQ9 QUESTIONS 1 & 2: 0
SUM OF ALL RESPONSES TO PHQ QUESTIONS 1-9: 0
SUM OF ALL RESPONSES TO PHQ QUESTIONS 1-9: 0
1. LITTLE INTEREST OR PLEASURE IN DOING THINGS: NOT AT ALL
2. FEELING DOWN, DEPRESSED OR HOPELESS: NOT AT ALL

## 2025-02-27 NOTE — PROGRESS NOTES
Patient: Madelin Parekh  : 1970    Primary Cardiologist:Dr. SLADE Floyd  EP Cardiologist:NONE   Primary [facility] cardiologist:   PCP: ZACKARY Ocasio MD    Today's Date: 2025      ASSESSMENT AND PLAN:     Assessment and Plan:  Possible chemotherapy induced declining GLS: This is likely not the case however since there is a decline in GLS we will repeat the limited echocardiogram again to reassess GLS and LVEF.  The GLS may have been lower because of technical limitations of the echocardiogram performed at [facility].  Right now she is not having any major symptoms therefore no need to start any cardiac medications. Repeat limited echo dated 2025:    Left Ventricle: Normal left ventricular systolic function. EF by 2D Simpsons Biplane is 62%. Left ventricle size is normal. Normal wall thickness. Mild hypokinesis of the following segments: apical anterior. Mild hypokinesis of the apex. Global longitudinal strain is normal with a value of -20.7%.    GLS: 2024= -20.9%; 25= -20.7%.  Left breast cancer under remission.  Continue anastrozole, riblciclib - dose may be reduced due to neutropenia.  Monitoring cardiotoxic medication: Baseline TTE 23 EF 64%. Will repeat every 12 weeks while undergoing HER-2 therapy. TTE 24 EF 65%; TTE EF 24 50-55%, saw cardiology, repeat TTE 25 EF 62%   Pectus excavatum - may limited thoughts of mastectomy.  CCS 40, 39 in LAD.  Discussed this is overall very low risk, however she has received left-sided radiation for treatment of her breast cancer and she is relatively young.  Advised continuing her low-dose statin can decrease to minimal doses.  Would repeat a calcium score in 5 to 8 years to see what if any degree of progression.      Follow up with Dr. SLADE Floyd in 12 months - she plans to to have cardiooncology surveillance echoes through Dr. Matt      ICD-10-CM    1. Agatston CAC score, <100  R93.1 atorvastatin (LIPITOR) 10 MG

## 2025-02-28 PROBLEM — R93.1 AGATSTON CAC SCORE, <100: Status: ACTIVE | Noted: 2025-02-28

## 2025-02-28 PROBLEM — Z79.899 ENCOUNTER FOR MONITORING CARDIOTOXIC DRUG THERAPY: Status: ACTIVE | Noted: 2025-01-14

## 2025-03-03 ENCOUNTER — HOSPITAL ENCOUNTER (OUTPATIENT)
Facility: HOSPITAL | Age: 55
Setting detail: INFUSION SERIES
Discharge: HOME | End: 2025-03-03
Payer: COMMERCIAL

## 2025-03-03 DIAGNOSIS — Z51.81 ENCOUNTER FOR MONITORING CARDIOTOXIC DRUG THERAPY: Primary | ICD-10-CM

## 2025-03-03 DIAGNOSIS — C50.112 MALIGNANT NEOPLASM OF CENTRAL PORTION OF LEFT BREAST IN FEMALE, ESTROGEN RECEPTOR POSITIVE (HCC): Primary | ICD-10-CM

## 2025-03-03 DIAGNOSIS — Z79.899 ENCOUNTER FOR MONITORING CARDIOTOXIC DRUG THERAPY: Primary | ICD-10-CM

## 2025-03-03 DIAGNOSIS — Z17.0 MALIGNANT NEOPLASM OF CENTRAL PORTION OF LEFT BREAST IN FEMALE, ESTROGEN RECEPTOR POSITIVE (HCC): Primary | ICD-10-CM

## 2025-03-03 DIAGNOSIS — C50.912 INVASIVE DUCTAL CARCINOMA OF BREAST, FEMALE, LEFT (HCC): ICD-10-CM

## 2025-03-03 LAB
ALBUMIN SERPL-MCNC: 4 G/DL (ref 3.5–5)
ALBUMIN/GLOB SERPL: 1.2 (ref 1.1–2.2)
ALP SERPL-CCNC: 50 U/L (ref 45–117)
ALT SERPL-CCNC: 28 U/L (ref 12–78)
ANION GAP SERPL CALC-SCNC: 3 MMOL/L (ref 2–12)
AST SERPL-CCNC: 26 U/L (ref 15–37)
BASOPHILS # BLD: 0.03 K/UL (ref 0–0.1)
BASOPHILS NFR BLD: 1.4 % (ref 0–1)
BILIRUB SERPL-MCNC: 0.6 MG/DL (ref 0.2–1)
BUN SERPL-MCNC: 19 MG/DL (ref 6–20)
BUN/CREAT SERPL: 28 (ref 12–20)
CALCIUM SERPL-MCNC: 9.8 MG/DL (ref 8.5–10.1)
CHLORIDE SERPL-SCNC: 107 MMOL/L (ref 97–108)
CO2 SERPL-SCNC: 33 MMOL/L (ref 21–32)
CREAT SERPL-MCNC: 0.69 MG/DL (ref 0.55–1.02)
DIFFERENTIAL METHOD BLD: ABNORMAL
EOSINOPHIL # BLD: 0.05 K/UL (ref 0–0.4)
EOSINOPHIL NFR BLD: 2.3 % (ref 0–7)
ERYTHROCYTE [DISTWIDTH] IN BLOOD BY AUTOMATED COUNT: 13.6 % (ref 11.5–14.5)
GLOBULIN SER CALC-MCNC: 3.4 G/DL (ref 2–4)
GLUCOSE SERPL-MCNC: 74 MG/DL (ref 65–100)
HCT VFR BLD AUTO: 37.6 % (ref 35–47)
HGB BLD-MCNC: 12.9 G/DL (ref 11.5–16)
IMM GRANULOCYTES # BLD AUTO: 0.01 K/UL (ref 0–0.04)
IMM GRANULOCYTES NFR BLD AUTO: 0.5 % (ref 0–0.5)
LYMPHOCYTES # BLD: 0.59 K/UL (ref 0.8–3.5)
LYMPHOCYTES NFR BLD: 26.9 % (ref 12–49)
MAGNESIUM SERPL-MCNC: 2.3 MG/DL (ref 1.6–2.4)
MCH RBC QN AUTO: 32.1 PG (ref 26–34)
MCHC RBC AUTO-ENTMCNC: 34.3 G/DL (ref 30–36.5)
MCV RBC AUTO: 93.5 FL (ref 80–99)
MONOCYTES # BLD: 0.32 K/UL (ref 0–1)
MONOCYTES NFR BLD: 14.6 % (ref 5–13)
NEUTS SEG # BLD: 1.2 K/UL (ref 1.8–8)
NEUTS SEG NFR BLD: 54.3 % (ref 32–75)
NRBC # BLD: 0 K/UL (ref 0–0.01)
NRBC BLD-RTO: 0 PER 100 WBC
PHOSPHATE SERPL-MCNC: 3.6 MG/DL (ref 2.6–4.7)
PLATELET # BLD AUTO: 169 K/UL (ref 150–400)
PMV BLD AUTO: 8.8 FL (ref 8.9–12.9)
POTASSIUM SERPL-SCNC: 3.8 MMOL/L (ref 3.5–5.1)
PROT SERPL-MCNC: 7.4 G/DL (ref 6.4–8.2)
RBC # BLD AUTO: 4.02 M/UL (ref 3.8–5.2)
RBC MORPH BLD: ABNORMAL
SODIUM SERPL-SCNC: 143 MMOL/L (ref 136–145)
WBC # BLD AUTO: 2.2 K/UL (ref 3.6–11)
WBC MORPH BLD: ABNORMAL

## 2025-03-03 PROCEDURE — 85025 COMPLETE CBC W/AUTO DIFF WBC: CPT

## 2025-03-03 PROCEDURE — 36415 COLL VENOUS BLD VENIPUNCTURE: CPT

## 2025-03-03 PROCEDURE — 83735 ASSAY OF MAGNESIUM: CPT

## 2025-03-03 PROCEDURE — 84100 ASSAY OF PHOSPHORUS: CPT

## 2025-03-03 PROCEDURE — 80053 COMPREHEN METABOLIC PANEL: CPT

## 2025-03-04 ENCOUNTER — HOSPITAL ENCOUNTER (OUTPATIENT)
Facility: HOSPITAL | Age: 55
Discharge: HOME | End: 2025-03-07
Attending: INTERNAL MEDICINE
Payer: COMMERCIAL

## 2025-03-04 DIAGNOSIS — C50.112 MALIGNANT NEOPLASM OF CENTRAL PORTION OF LEFT BREAST IN FEMALE, ESTROGEN RECEPTOR POSITIVE (HCC): ICD-10-CM

## 2025-03-04 DIAGNOSIS — Z17.0 MALIGNANT NEOPLASM OF CENTRAL PORTION OF LEFT BREAST IN FEMALE, ESTROGEN RECEPTOR POSITIVE (HCC): ICD-10-CM

## 2025-03-04 PROCEDURE — 71250 CT THORAX DX C-: CPT

## 2025-03-05 ENCOUNTER — TELEPHONE (OUTPATIENT)
Age: 55
End: 2025-03-05

## 2025-03-05 NOTE — TELEPHONE ENCOUNTER
Patient called and stated that she has some questions regarding her CT scan she got done yesterday. Requested a call back.     # [phone]

## 2025-03-05 NOTE — TELEPHONE ENCOUNTER
[facility]orentin [facility] [place] at [facility]  [phone]    3/5/2025 1140 Returned call to patient, questioning if length of time for CT being read correlates to the severity of results. Notified that it can take a few days for CT to be read by radiologist and it is no indication of the results. Patient stated understanding and has no further concerns at this time.

## 2025-03-10 ENCOUNTER — RESULTS FOLLOW-UP (OUTPATIENT)
Facility: HOSPITAL | Age: 55
End: 2025-03-10

## 2025-03-10 ENCOUNTER — APPOINTMENT (OUTPATIENT)
Facility: HOSPITAL | Age: 55
End: 2025-03-10
Payer: COMMERCIAL

## 2025-03-11 ENCOUNTER — PATIENT MESSAGE (OUTPATIENT)
Age: 55
End: 2025-03-11

## 2025-03-11 NOTE — TELEPHONE ENCOUNTER
Florentin [facility] [place] at [facility]  [phone]    03/11/25 12:00 PM EDT - A phone call was made to the patient to let her know that her pulmonary nodule has resolved and no further follow up is needed, per Marianna Gonsalves NP. Patient verbalized understanding and had no further questions.

## 2025-03-11 NOTE — TELEPHONE ENCOUNTER
----- Message from LEI Bruce NP sent at 3/10/2025  5:04 PM EDT -----  Please let her know the pulmonary nodule has resolved. No further follow up needed.

## 2025-03-11 NOTE — TELEPHONE ENCOUNTER
See additional msg, patient sent 2 separate msgs w/ 2 separate questions. Contains response to one msg.

## 2025-03-13 ENCOUNTER — HOSPITAL ENCOUNTER (OUTPATIENT)
Facility: HOSPITAL | Age: 55
Setting detail: RECURRING SERIES
Discharge: HOME | End: 2025-03-16
Payer: COMMERCIAL

## 2025-03-13 PROCEDURE — 97110 THERAPEUTIC EXERCISES: CPT

## 2025-03-13 PROCEDURE — 97535 SELF CARE MNGMENT TRAINING: CPT

## 2025-03-13 PROCEDURE — 97140 MANUAL THERAPY 1/> REGIONS: CPT

## 2025-03-13 NOTE — PROGRESS NOTES
[facility]in [facility]  a part of [facility]   [address], [address]  [place] [address]  Phone: [phone]  Fax: [phone]    PHYSICAL THERAPY/OCCUPATIONAL THERAPY PROGRESS NOTE  Patient Name:  Madelin Parekh :  1970   Treatment/Medical Diagnosis: No admission diagnoses are documented for this encounter.   Referral Source:  Lon Ferreira, Ton DILLARD, *     Date of Initial Visit:  2024 Attended Visits:  3 Missed Visits:  1     SUMMARY OF TREATMENT/ASSESSMENT:       Patient returns for reassessment of compression garment use and fit and for education in remedial exercises and self manual lymph drainage techniques. Educated in  all aspects of home program including  manual lymph drainage and exercises.  Compression garments are working well for patient a reorder will be placed today.  Limb volume is decreased on assessment.  No size changes are needed.  Reassessment yields left shoulder range of motion is  now WFL.  Patient will work with manual lymph drainage handout and practice the techniques this weekend.  She will contact therapist next week if she feels she needs additional review. If she is comfortable with self manual lymph drainage she will be discharged to the home restorative phase of treatment. If she would like additional review, she will be seen for 1 additional treatment session prior to discharge to the home restorative phase of treatment.    Short term goals 1, 2 and 3 are met  and long term goals 1, 2 are met:   Long term goal 3 is in progress.    Patient will continue to benefit from skilled PT / OT services to address ROM deficits, address swelling, analyze compression product fit and use, instruct in home lymphedema management program, and measure for compression products to address functional deficits and attain remaining goals.      CURRENT STATUS/GOALS      Short Term Goals: To be accomplished in 4  treatments  Patient and/or caregiver will 
condition, as well as promote stable limb left UE limb volume with no worsening of swelling for proper fit of clothing. Educated today and in progress      PLAN  Yes  Continue plan of care  Re-Cert Due: 6 treatments  []  Upgrade activities as tolerated  []  Discharge due to:   [x]  Other:  patient to call with feedback on home performance of MLD, she will be seen for 1 more visit if additional review is needed, if no review is need she will be discharged       SAGRARIO Stroud, ABDULAZIZT-MEGHAN   3/13/2025       4:35 PM

## 2025-03-13 NOTE — THERAPY DISCHARGE
[facility]orentin [facility]  a part of [facility]   [address], [address]  [place] [address]  Phone: [phone]  Fax: [phone]    DISCHARGE SUMMARY  Patient Name: Madelin Parekh : 1970   Treatment/Medical Diagnosis: No admission diagnoses are documented for this encounter.   Referral Source: Lon Ferreira, Ton DILLARD, *     Date of Initial Visit: 2024 Attended Visits: 2 Missed Visits: 1     SUMMARY OF TREATMENT       CURRENT STATUS      Short Term Goals: To be accomplished in 4  treatments  Patient and/or caregiver will verbalize 3/3 signs and symptoms of infection without external cueing, in order to reduce the risk of infection and skin impairment and to promote optimal self management of condition.       Status at last Eval/Progress Note: Initiated  Current Status:  met  Goal Met?   yes     Patient to perform 5/5 lymphedema remedial exercises in session with modified independence utilizing HEP handout, in order to promote optimal independence with management of  condition, as well as promote optimal limb volume reduction required for proper fit of donned clothing.      Status at last Eval/Progress Note: Initiated  Current Status: in progress  Goal Met?  No     3. Patient will be measured for and obtain comfortable and optimal fitting compression products to prevent reaccumulation of fluid at discharge which could impair patient's ability to perform   safe mobility and dressing.     Status at last Eval/Progress Note: Initiated  Current Status:  met  Goal Met?  yes     Long Term Goals: To be accomplished in 6  treatments  Patient will demonstrate an improvement in self perceived functional impairment as evidenced by an improved FOTO score from 79 to 84.     Status at last Eval/Progress Note: Initiated  Current Status:  met  Goal Met?  Yes     2. Patient/caregiver will demonstrate improved edema management as evidenced by performing donning/doffing of garments modified

## 2025-03-17 ENCOUNTER — APPOINTMENT (OUTPATIENT)
Facility: HOSPITAL | Age: 55
End: 2025-03-17
Payer: COMMERCIAL

## 2025-03-24 ENCOUNTER — APPOINTMENT (OUTPATIENT)
Facility: HOSPITAL | Age: 55
End: 2025-03-24
Payer: COMMERCIAL

## 2025-03-24 ENCOUNTER — PATIENT MESSAGE (OUTPATIENT)
Age: 55
End: 2025-03-24

## 2025-03-26 ENCOUNTER — HOSPITAL ENCOUNTER (OUTPATIENT)
Facility: HOSPITAL | Age: 55
Setting detail: INFUSION SERIES
Discharge: HOME | End: 2025-03-26
Payer: COMMERCIAL

## 2025-03-26 ENCOUNTER — OFFICE VISIT (OUTPATIENT)
Age: 55
End: 2025-03-26
Payer: COMMERCIAL

## 2025-03-26 ENCOUNTER — RESULTS FOLLOW-UP (OUTPATIENT)
Age: 55
End: 2025-03-26

## 2025-03-26 VITALS
DIASTOLIC BLOOD PRESSURE: 61 MMHG | RESPIRATION RATE: 16 BRPM | TEMPERATURE: 97.1 F | SYSTOLIC BLOOD PRESSURE: 116 MMHG | HEART RATE: 71 BPM

## 2025-03-26 VITALS
WEIGHT: 109 LBS | SYSTOLIC BLOOD PRESSURE: 116 MMHG | OXYGEN SATURATION: 100 % | DIASTOLIC BLOOD PRESSURE: 61 MMHG | HEIGHT: 64 IN | TEMPERATURE: 97.7 F | RESPIRATION RATE: 16 BRPM | HEART RATE: 71 BPM | BODY MASS INDEX: 18.61 KG/M2

## 2025-03-26 DIAGNOSIS — Z79.899 ENCOUNTER FOR MONITORING CARDIOTOXIC DRUG THERAPY: Primary | ICD-10-CM

## 2025-03-26 DIAGNOSIS — C50.112 MALIGNANT NEOPLASM OF CENTRAL PORTION OF LEFT BREAST IN FEMALE, ESTROGEN RECEPTOR POSITIVE: Primary | ICD-10-CM

## 2025-03-26 DIAGNOSIS — Z17.0 MALIGNANT NEOPLASM OF CENTRAL PORTION OF LEFT BREAST IN FEMALE, ESTROGEN RECEPTOR POSITIVE: Primary | ICD-10-CM

## 2025-03-26 DIAGNOSIS — Z51.81 ENCOUNTER FOR MONITORING CARDIOTOXIC DRUG THERAPY: Primary | ICD-10-CM

## 2025-03-26 DIAGNOSIS — C50.912 INVASIVE DUCTAL CARCINOMA OF BREAST, FEMALE, LEFT: ICD-10-CM

## 2025-03-26 LAB
ALBUMIN SERPL-MCNC: 3.9 G/DL (ref 3.5–5)
ALBUMIN/GLOB SERPL: 1.3 (ref 1.1–2.2)
ALP SERPL-CCNC: 42 U/L (ref 45–117)
ALT SERPL-CCNC: 23 U/L (ref 12–78)
ANION GAP SERPL CALC-SCNC: 3 MMOL/L (ref 2–12)
AST SERPL-CCNC: 20 U/L (ref 15–37)
BASOPHILS # BLD: 0.03 K/UL (ref 0–0.1)
BASOPHILS NFR BLD: 1.6 % (ref 0–1)
BILIRUB SERPL-MCNC: 0.7 MG/DL (ref 0.2–1)
BUN SERPL-MCNC: 19 MG/DL (ref 6–20)
BUN/CREAT SERPL: 26 (ref 12–20)
CALCIUM SERPL-MCNC: 9.4 MG/DL (ref 8.5–10.1)
CHLORIDE SERPL-SCNC: 107 MMOL/L (ref 97–108)
CO2 SERPL-SCNC: 31 MMOL/L (ref 21–32)
CREAT SERPL-MCNC: 0.72 MG/DL (ref 0.55–1.02)
DIFFERENTIAL METHOD BLD: ABNORMAL
EOSINOPHIL # BLD: 0.06 K/UL (ref 0–0.4)
EOSINOPHIL NFR BLD: 3.2 % (ref 0–7)
ERYTHROCYTE [DISTWIDTH] IN BLOOD BY AUTOMATED COUNT: 13.8 % (ref 11.5–14.5)
GLOBULIN SER CALC-MCNC: 3.1 G/DL (ref 2–4)
GLUCOSE SERPL-MCNC: 88 MG/DL (ref 65–100)
HCT VFR BLD AUTO: 35.6 % (ref 35–47)
HGB BLD-MCNC: 12.2 G/DL (ref 11.5–16)
IMM GRANULOCYTES # BLD AUTO: 0.01 K/UL (ref 0–0.04)
IMM GRANULOCYTES NFR BLD AUTO: 0.5 % (ref 0–0.5)
LYMPHOCYTES # BLD: 0.52 K/UL (ref 0.8–3.5)
LYMPHOCYTES NFR BLD: 27.4 % (ref 12–49)
MAGNESIUM SERPL-MCNC: 2.1 MG/DL (ref 1.6–2.4)
MCH RBC QN AUTO: 32.2 PG (ref 26–34)
MCHC RBC AUTO-ENTMCNC: 34.3 G/DL (ref 30–36.5)
MCV RBC AUTO: 93.9 FL (ref 80–99)
MONOCYTES # BLD: 0.25 K/UL (ref 0–1)
MONOCYTES NFR BLD: 12.9 % (ref 5–13)
NEUTS SEG # BLD: 1.03 K/UL (ref 1.8–8)
NEUTS SEG NFR BLD: 54.4 % (ref 32–75)
NRBC # BLD: 0 K/UL (ref 0–0.01)
NRBC BLD-RTO: 0 PER 100 WBC
PHOSPHATE SERPL-MCNC: 3.5 MG/DL (ref 2.6–4.7)
PLATELET # BLD AUTO: 143 K/UL (ref 150–400)
PMV BLD AUTO: 8.5 FL (ref 8.9–12.9)
POTASSIUM SERPL-SCNC: 3.7 MMOL/L (ref 3.5–5.1)
PROT SERPL-MCNC: 7 G/DL (ref 6.4–8.2)
RBC # BLD AUTO: 3.79 M/UL (ref 3.8–5.2)
RBC MORPH BLD: ABNORMAL
SODIUM SERPL-SCNC: 141 MMOL/L (ref 136–145)
WBC # BLD AUTO: 1.9 K/UL (ref 3.6–11)
WBC MORPH BLD: ABNORMAL

## 2025-03-26 PROCEDURE — 83735 ASSAY OF MAGNESIUM: CPT

## 2025-03-26 PROCEDURE — 36415 COLL VENOUS BLD VENIPUNCTURE: CPT

## 2025-03-26 PROCEDURE — 85025 COMPLETE CBC W/AUTO DIFF WBC: CPT

## 2025-03-26 PROCEDURE — 99215 OFFICE O/P EST HI 40 MIN: CPT | Performed by: INTERNAL MEDICINE

## 2025-03-26 PROCEDURE — 80053 COMPREHEN METABOLIC PANEL: CPT

## 2025-03-26 PROCEDURE — 84100 ASSAY OF PHOSPHORUS: CPT

## 2025-03-26 ASSESSMENT — PATIENT HEALTH QUESTIONNAIRE - PHQ9
SUM OF ALL RESPONSES TO PHQ QUESTIONS 1-9: 0
2. FEELING DOWN, DEPRESSED OR HOPELESS: NOT AT ALL
SUM OF ALL RESPONSES TO PHQ QUESTIONS 1-9: 0
1. LITTLE INTEREST OR PLEASURE IN DOING THINGS: NOT AT ALL

## 2025-03-26 NOTE — PROGRESS NOTES
Madelin Parekh is a 55 y.o. female is here today for a follow up.    1. Have you been to the ER, urgent care clinic since your last visit?  Hospitalized since your last visit?No    2. Have you seen or consulted any other health care providers outside of the [facility] since your last visit?  Include any pap smears or colon screening. No       3/26/2025  8:00 AM   Vitals    SYSTOLIC 116    DIASTOLIC 61    BP Site    Patient Position    BP Cuff Size    Pulse 71    Temp 97.1 °F (36.2 °C)    Respirations 16    SpO2        
Mother     Cancer Father 67        colon    Dementia Father 60        Alzheimers    Colon Cancer Father         He was diagnosed at 68 at a late stage    No Known Problems Sister     No Known Problems Sister     Cancer Maternal Aunt         breast    Ovarian Cancer Maternal Aunt     Cancer Maternal Aunt         My Aunt was diagnosed with Breast cancer and Lung cancer.    Cancer Maternal Aunt         My Aunt was diagnosed with Ovarian cancer.    Anesth Problems Neg Hx      Current Outpatient Medications   Medication Sig    Ribociclib Succ, 200 MG Dose, 200 MG TBPK Take 1 tablet by mouth on days 1-21 then 7 days off and repeat.    atorvastatin (LIPITOR) 10 MG tablet Take 1 tablet by mouth daily    Omega-3 Fatty Acids (FISH OIL) 1000 MG capsule Take by mouth daily    ondansetron (ZOFRAN) 8 MG tablet Take 1 tablet by mouth every 8 hours as needed for Nausea or Vomiting    anastrozole (ARIMIDEX) 1 MG tablet Take 1 tablet by mouth daily    Biotin 1 MG CAPS Take by mouth    ALPRAZolam (XANAX) 0.5 MG tablet Take 1 tablet by mouth as needed for Sleep.    sertraline (ZOLOFT) 25 MG tablet Take 1 tablet by mouth every morning    clonazePAM (KLONOPIN) 0.5 MG tablet Take 1 tablet by mouth 2 times daily as needed for Anxiety.    Cholecalciferol 50 MCG (2000 UT) TABS Take 1 tablet by mouth every morning    zolpidem (AMBIEN) 5 MG tablet Take 1 tablet by mouth nightly as needed.     No current facility-administered medications for this visit.      Allergies   Allergen Reactions    Morphine Other (See Comments)     PT STATES SHE FELT TRAPPED IN HER OWN BODY        Review of Systems: A complete review of systems was obtained, negative except as described above.    Physical Exam:   /61   Pulse 71   Temp 97.7 °F (36.5 °C) (Temporal)   Resp 16   Ht 1.626 m (5' 4\")   Wt 49.4 kg (109 lb)   LMP  (Exact Date)   SpO2 100%   BMI 18.71 kg/m²   ECOG PS: 0    Constitutional: Appears well-developed and well-nourished in no apparent

## 2025-04-14 ENCOUNTER — TELEPHONE (OUTPATIENT)
Age: 55
End: 2025-04-14

## 2025-04-14 ENCOUNTER — APPOINTMENT (OUTPATIENT)
Facility: HOSPITAL | Age: 55
End: 2025-04-14
Payer: COMMERCIAL

## 2025-04-14 NOTE — TELEPHONE ENCOUNTER
Patient called and stated that she was told to use biotin by her dentist. Patient stated that she would like to know if there were any additional recommendations. Requested a call back.      # [phone]

## 2025-04-15 NOTE — TELEPHONE ENCOUNTER
[facility] [place] at [facility]  [phone]    4/15/25 1030 Returned patients call for additional information regarding biotin recommendation. No answer, left message requesting return call.

## 2025-04-15 NOTE — TELEPHONE ENCOUNTER
[facility]in [facility] [place] at [facility]  [phone]    04/15/25 10:48 AM EDT - Received phone call from patient wanting to know if she can use OTC Biotin mouthwash twice per day. Patient was inform that she is able to use Biotin mouthwash twice daily. Patient was advised to give us a call back in about a week to let us know if this mouthwash is helping. Patient verbalized understanding and had no further questions.

## 2025-04-21 ENCOUNTER — APPOINTMENT (OUTPATIENT)
Facility: HOSPITAL | Age: 55
End: 2025-04-21
Payer: COMMERCIAL

## 2025-04-28 ENCOUNTER — HOSPITAL ENCOUNTER (OUTPATIENT)
Facility: HOSPITAL | Age: 55
Setting detail: INFUSION SERIES
Discharge: HOME | End: 2025-04-28
Payer: COMMERCIAL

## 2025-04-28 ENCOUNTER — RESULTS FOLLOW-UP (OUTPATIENT)
Age: 55
End: 2025-04-28

## 2025-04-28 ENCOUNTER — OFFICE VISIT (OUTPATIENT)
Age: 55
End: 2025-04-28
Payer: COMMERCIAL

## 2025-04-28 VITALS
SYSTOLIC BLOOD PRESSURE: 115 MMHG | HEIGHT: 64 IN | RESPIRATION RATE: 18 BRPM | WEIGHT: 107 LBS | OXYGEN SATURATION: 100 % | DIASTOLIC BLOOD PRESSURE: 61 MMHG | TEMPERATURE: 97.5 F | BODY MASS INDEX: 18.27 KG/M2 | HEART RATE: 64 BPM

## 2025-04-28 VITALS
DIASTOLIC BLOOD PRESSURE: 61 MMHG | RESPIRATION RATE: 18 BRPM | TEMPERATURE: 97.5 F | WEIGHT: 107.5 LBS | BODY MASS INDEX: 18.45 KG/M2 | HEART RATE: 64 BPM | SYSTOLIC BLOOD PRESSURE: 115 MMHG | OXYGEN SATURATION: 100 %

## 2025-04-28 DIAGNOSIS — Z79.899 ENCOUNTER FOR MONITORING CARDIOTOXIC DRUG THERAPY: Primary | ICD-10-CM

## 2025-04-28 DIAGNOSIS — Z17.0 MALIGNANT NEOPLASM OF CENTRAL PORTION OF LEFT BREAST IN FEMALE, ESTROGEN RECEPTOR POSITIVE (HCC): Primary | ICD-10-CM

## 2025-04-28 DIAGNOSIS — C50.912 INVASIVE DUCTAL CARCINOMA OF BREAST, FEMALE, LEFT (HCC): ICD-10-CM

## 2025-04-28 DIAGNOSIS — C50.112 MALIGNANT NEOPLASM OF CENTRAL PORTION OF LEFT BREAST IN FEMALE, ESTROGEN RECEPTOR POSITIVE (HCC): Primary | ICD-10-CM

## 2025-04-28 DIAGNOSIS — Z51.81 ENCOUNTER FOR MONITORING CARDIOTOXIC DRUG THERAPY: Primary | ICD-10-CM

## 2025-04-28 LAB
ALBUMIN SERPL-MCNC: 4 G/DL (ref 3.5–5)
ALBUMIN/GLOB SERPL: 1.3 (ref 1.1–2.2)
ALP SERPL-CCNC: 42 U/L (ref 45–117)
ALT SERPL-CCNC: 27 U/L (ref 12–78)
ANION GAP SERPL CALC-SCNC: 2 MMOL/L (ref 2–12)
AST SERPL-CCNC: 21 U/L (ref 15–37)
BASOPHILS # BLD: 0.02 K/UL (ref 0–0.1)
BASOPHILS NFR BLD: 0.9 % (ref 0–1)
BILIRUB SERPL-MCNC: 0.6 MG/DL (ref 0.2–1)
BUN SERPL-MCNC: 22 MG/DL (ref 6–20)
BUN/CREAT SERPL: 34 (ref 12–20)
CALCIUM SERPL-MCNC: 9.2 MG/DL (ref 8.5–10.1)
CHLORIDE SERPL-SCNC: 108 MMOL/L (ref 97–108)
CO2 SERPL-SCNC: 32 MMOL/L (ref 21–32)
CREAT SERPL-MCNC: 0.64 MG/DL (ref 0.55–1.02)
DIFFERENTIAL METHOD BLD: ABNORMAL
EOSINOPHIL # BLD: 0.07 K/UL (ref 0–0.4)
EOSINOPHIL NFR BLD: 3.2 % (ref 0–7)
ERYTHROCYTE [DISTWIDTH] IN BLOOD BY AUTOMATED COUNT: 13.1 % (ref 11.5–14.5)
GLOBULIN SER CALC-MCNC: 3.2 G/DL (ref 2–4)
GLUCOSE SERPL-MCNC: 61 MG/DL (ref 65–100)
HCT VFR BLD AUTO: 36.2 % (ref 35–47)
HGB BLD-MCNC: 12.4 G/DL (ref 11.5–16)
IMM GRANULOCYTES # BLD AUTO: 0.01 K/UL (ref 0–0.04)
IMM GRANULOCYTES NFR BLD AUTO: 0.5 % (ref 0–0.5)
LYMPHOCYTES # BLD: 0.64 K/UL (ref 0.8–3.5)
LYMPHOCYTES NFR BLD: 29.3 % (ref 12–49)
MAGNESIUM SERPL-MCNC: 2.3 MG/DL (ref 1.6–2.4)
MCH RBC QN AUTO: 32.7 PG (ref 26–34)
MCHC RBC AUTO-ENTMCNC: 34.3 G/DL (ref 30–36.5)
MCV RBC AUTO: 95.5 FL (ref 80–99)
MONOCYTES # BLD: 0.36 K/UL (ref 0–1)
MONOCYTES NFR BLD: 16.2 % (ref 5–13)
NEUTS SEG # BLD: 1.1 K/UL (ref 1.8–8)
NEUTS SEG NFR BLD: 49.9 % (ref 32–75)
NRBC # BLD: 0 K/UL (ref 0–0.01)
NRBC BLD-RTO: 0 PER 100 WBC
PHOSPHATE SERPL-MCNC: 3 MG/DL (ref 2.6–4.7)
PLATELET # BLD AUTO: 154 K/UL (ref 150–400)
PMV BLD AUTO: 8.3 FL (ref 8.9–12.9)
POTASSIUM SERPL-SCNC: 3.7 MMOL/L (ref 3.5–5.1)
PROT SERPL-MCNC: 7.2 G/DL (ref 6.4–8.2)
RBC # BLD AUTO: 3.79 M/UL (ref 3.8–5.2)
RBC MORPH BLD: ABNORMAL
SODIUM SERPL-SCNC: 142 MMOL/L (ref 136–145)
WBC # BLD AUTO: 2.2 K/UL (ref 3.6–11)

## 2025-04-28 PROCEDURE — 85025 COMPLETE CBC W/AUTO DIFF WBC: CPT

## 2025-04-28 PROCEDURE — 36415 COLL VENOUS BLD VENIPUNCTURE: CPT

## 2025-04-28 PROCEDURE — 80053 COMPREHEN METABOLIC PANEL: CPT

## 2025-04-28 PROCEDURE — 83735 ASSAY OF MAGNESIUM: CPT

## 2025-04-28 PROCEDURE — 99215 OFFICE O/P EST HI 40 MIN: CPT | Performed by: INTERNAL MEDICINE

## 2025-04-28 PROCEDURE — 84100 ASSAY OF PHOSPHORUS: CPT

## 2025-04-28 ASSESSMENT — PATIENT HEALTH QUESTIONNAIRE - PHQ9
SUM OF ALL RESPONSES TO PHQ QUESTIONS 1-9: 0
1. LITTLE INTEREST OR PLEASURE IN DOING THINGS: NOT AT ALL
2. FEELING DOWN, DEPRESSED OR HOPELESS: NOT AT ALL

## 2025-04-28 NOTE — PROGRESS NOTES
Madelin Parekh is a 55 y.o. female is here today for a follow up.    1. Have you been to the ER, urgent care clinic since your last visit?  Hospitalized since your last visit?No    2. Have you seen or consulted any other health care providers outside of the [facility] since your last visit?  Include any pap smears or colon screening. No       4/28/2025  8:15 AM   Vitals    SYSTOLIC 115    DIASTOLIC 61    BP Site    Patient Position    BP Cuff Size    Pulse 64    Temp 97.5 °F (36.4 °C)    Respirations 18    SpO2 100 %    Weight - Scale 107 lb 8 oz    Height    Body Mass Index    Pain Score    Pain Level        
Pt here for sore throat x3 days, worse today, with difficulty swallowing.   
 Alzheimers    Colon Cancer Father         He was diagnosed at 68 at a late stage    No Known Problems Sister     No Known Problems Sister     Cancer Maternal Aunt         breast    Ovarian Cancer Maternal Aunt     Cancer Maternal Aunt         My Aunt was diagnosed with Breast cancer and Lung cancer.    Cancer Maternal Aunt         My Aunt was diagnosed with Ovarian cancer.    Anesth Problems Neg Hx      Current Outpatient Medications   Medication Sig    Ribociclib Succ, 200 MG Dose, 200 MG TBPK Take 1 tablet by mouth on days 1-21 then 7 days off and repeat.    atorvastatin (LIPITOR) 10 MG tablet Take 1 tablet by mouth daily    Omega-3 Fatty Acids (FISH OIL) 1000 MG capsule Take by mouth daily    ondansetron (ZOFRAN) 8 MG tablet Take 1 tablet by mouth every 8 hours as needed for Nausea or Vomiting    anastrozole (ARIMIDEX) 1 MG tablet Take 1 tablet by mouth daily    Biotin 1 MG CAPS Take by mouth    ALPRAZolam (XANAX) 0.5 MG tablet Take 1 tablet by mouth as needed for Sleep.    sertraline (ZOLOFT) 25 MG tablet Take 1 tablet by mouth every morning    clonazePAM (KLONOPIN) 0.5 MG tablet Take 1 tablet by mouth 2 times daily as needed for Anxiety.    Cholecalciferol 50 MCG (2000 UT) TABS Take 1 tablet by mouth every morning    zolpidem (AMBIEN) 5 MG tablet Take 1 tablet by mouth nightly as needed.     No current facility-administered medications for this visit.      Allergies   Allergen Reactions    Morphine Other (See Comments)     PT STATES SHE FELT TRAPPED IN HER OWN BODY        Review of Systems: A complete review of systems was obtained, negative except as described above.    Physical Exam:   /61   Pulse 64   Temp 97.5 °F (36.4 °C) (Temporal)   Resp 18   Ht 1.626 m (5' 4\")   Wt 48.5 kg (107 lb)   LMP  (Exact Date)   SpO2 100%   BMI 18.37 kg/m²   ECOG PS: 0    Constitutional: Appears well-developed and well-nourished in no apparent distress      Mental status: Alert and awake, Oriented to

## 2025-04-29 ENCOUNTER — TELEPHONE (OUTPATIENT)
Facility: HOSPITAL | Age: 55
End: 2025-04-29

## 2025-04-29 NOTE — TELEPHONE ENCOUNTER
[facility]  Breast Navigator Encounter    Name:    Madelin Parekh  Age:    55 y.o.  Diagnosis:   LEFT breast cancer    Returned patient's call.  Is still contemplating mastectomies.  Has seen Dr. Zarco and Dr. Reid.  Dr. Zarco does not want to do her reconstruction since she has had radiation; she also has a pectus abnormality.  Dr. Reid says that because she has had major abdominal surgery he will not do a ENEDELIA, but he will do implants.  He did say that it is riskier since she has had radiation.     She is still struggling with thoughts about recurrence, although she realizes that the survival for mastectomy vs lumpectomy and radiation is the same and the local recurrence is just slightly higher with a mastectomy.  It all comes back to her when she has to go for her monthly labs at Dr. Matt's office; these are because she is on Kisqali.      She has an appt on 5/5 with Dr. Forrest, but knows that she will not be doing surgery this summer.   Her son goes off to college in the fall, so that may be the time to have surgery if she decides to do it.  I told her I could cancel her appt with Dr. Forrest.  She will probably make an appt for July, at which point she would like an exam, a possible U/S and also would like to talk to him about staggering screening mammogram and MRI, and if he prefers this or mammogram and MRI at the same time, which is Dr. Matt's recommendation.  I told her I did not know why physician's recommendations were different for this.      We also discussed my surgery and decisions as we have in the past.      She was appreciative of the return call.  She will reach out with other questions.                               Lauren Nath RN, BSN, CBCN  Oncology Breast Navigator     [address]  [address]  W: [phone]  F: [phone]  [email or web address]  Good Help to Those in Need®

## 2025-05-12 ENCOUNTER — APPOINTMENT (OUTPATIENT)
Facility: HOSPITAL | Age: 55
End: 2025-05-12
Payer: COMMERCIAL

## 2025-05-13 ENCOUNTER — PATIENT MESSAGE (OUTPATIENT)
Age: 55
End: 2025-05-13

## 2025-05-14 RX ORDER — ANASTROZOLE 1 MG/1
1 TABLET ORAL DAILY
Qty: 90 TABLET | Refills: 3 | Status: SHIPPED | OUTPATIENT
Start: 2025-05-14

## 2025-05-19 ENCOUNTER — APPOINTMENT (OUTPATIENT)
Facility: HOSPITAL | Age: 55
End: 2025-05-19
Payer: COMMERCIAL

## 2025-05-27 ENCOUNTER — OFFICE VISIT (OUTPATIENT)
Age: 55
End: 2025-05-27
Payer: COMMERCIAL

## 2025-05-27 ENCOUNTER — HOSPITAL ENCOUNTER (OUTPATIENT)
Facility: HOSPITAL | Age: 55
Setting detail: INFUSION SERIES
Discharge: HOME | End: 2025-05-27
Payer: COMMERCIAL

## 2025-05-27 VITALS
DIASTOLIC BLOOD PRESSURE: 75 MMHG | HEIGHT: 64 IN | RESPIRATION RATE: 18 BRPM | HEART RATE: 94 BPM | BODY MASS INDEX: 18.44 KG/M2 | SYSTOLIC BLOOD PRESSURE: 117 MMHG | TEMPERATURE: 98.3 F | WEIGHT: 108 LBS | OXYGEN SATURATION: 93 %

## 2025-05-27 DIAGNOSIS — Z17.0 MALIGNANT NEOPLASM OF CENTRAL PORTION OF LEFT BREAST IN FEMALE, ESTROGEN RECEPTOR POSITIVE (HCC): Primary | ICD-10-CM

## 2025-05-27 DIAGNOSIS — C50.112 MALIGNANT NEOPLASM OF CENTRAL PORTION OF LEFT BREAST IN FEMALE, ESTROGEN RECEPTOR POSITIVE (HCC): Primary | ICD-10-CM

## 2025-05-27 DIAGNOSIS — Z51.81 ENCOUNTER FOR MONITORING CARDIOTOXIC DRUG THERAPY: Primary | ICD-10-CM

## 2025-05-27 DIAGNOSIS — C50.912 INVASIVE DUCTAL CARCINOMA OF BREAST, FEMALE, LEFT (HCC): ICD-10-CM

## 2025-05-27 DIAGNOSIS — Z79.899 ENCOUNTER FOR MONITORING CARDIOTOXIC DRUG THERAPY: Primary | ICD-10-CM

## 2025-05-27 LAB
ALBUMIN SERPL-MCNC: 3.8 G/DL (ref 3.5–5)
ALBUMIN/GLOB SERPL: 1.1 (ref 1.1–2.2)
ALP SERPL-CCNC: 48 U/L (ref 45–117)
ALT SERPL-CCNC: 23 U/L (ref 12–78)
ANION GAP SERPL CALC-SCNC: 3 MMOL/L (ref 2–12)
AST SERPL-CCNC: 23 U/L (ref 15–37)
BASOPHILS # BLD: 0.03 K/UL (ref 0–0.1)
BASOPHILS NFR BLD: 1.1 % (ref 0–1)
BILIRUB SERPL-MCNC: 0.7 MG/DL (ref 0.2–1)
BUN SERPL-MCNC: 14 MG/DL (ref 6–20)
BUN/CREAT SERPL: 23 (ref 12–20)
CALCIUM SERPL-MCNC: 9.4 MG/DL (ref 8.5–10.1)
CHLORIDE SERPL-SCNC: 105 MMOL/L (ref 97–108)
CO2 SERPL-SCNC: 30 MMOL/L (ref 21–32)
CREAT SERPL-MCNC: 0.62 MG/DL (ref 0.55–1.02)
DIFFERENTIAL METHOD BLD: ABNORMAL
EOSINOPHIL # BLD: 0.05 K/UL (ref 0–0.4)
EOSINOPHIL NFR BLD: 1.9 % (ref 0–7)
ERYTHROCYTE [DISTWIDTH] IN BLOOD BY AUTOMATED COUNT: 12.6 % (ref 11.5–14.5)
GLOBULIN SER CALC-MCNC: 3.5 G/DL (ref 2–4)
GLUCOSE SERPL-MCNC: 110 MG/DL (ref 65–100)
HCT VFR BLD AUTO: 37.1 % (ref 35–47)
HGB BLD-MCNC: 12.9 G/DL (ref 11.5–16)
IMM GRANULOCYTES # BLD AUTO: 0.01 K/UL (ref 0–0.04)
IMM GRANULOCYTES NFR BLD AUTO: 0.4 % (ref 0–0.5)
LYMPHOCYTES # BLD: 0.68 K/UL (ref 0.8–3.5)
LYMPHOCYTES NFR BLD: 26.1 % (ref 12–49)
MAGNESIUM SERPL-MCNC: 2.1 MG/DL (ref 1.6–2.4)
MCH RBC QN AUTO: 32.8 PG (ref 26–34)
MCHC RBC AUTO-ENTMCNC: 34.8 G/DL (ref 30–36.5)
MCV RBC AUTO: 94.4 FL (ref 80–99)
MONOCYTES # BLD: 0.28 K/UL (ref 0–1)
MONOCYTES NFR BLD: 10.6 % (ref 5–13)
NEUTS SEG # BLD: 1.55 K/UL (ref 1.8–8)
NEUTS SEG NFR BLD: 59.9 % (ref 32–75)
NRBC # BLD: 0 K/UL (ref 0–0.01)
NRBC BLD-RTO: 0 PER 100 WBC
PHOSPHATE SERPL-MCNC: 2.8 MG/DL (ref 2.6–4.7)
PLATELET # BLD AUTO: 167 K/UL (ref 150–400)
PMV BLD AUTO: 8.4 FL (ref 8.9–12.9)
POTASSIUM SERPL-SCNC: 3.4 MMOL/L (ref 3.5–5.1)
PROT SERPL-MCNC: 7.3 G/DL (ref 6.4–8.2)
RBC # BLD AUTO: 3.93 M/UL (ref 3.8–5.2)
RBC MORPH BLD: ABNORMAL
SODIUM SERPL-SCNC: 138 MMOL/L (ref 136–145)
WBC # BLD AUTO: 2.6 K/UL (ref 3.6–11)

## 2025-05-27 PROCEDURE — 85025 COMPLETE CBC W/AUTO DIFF WBC: CPT

## 2025-05-27 PROCEDURE — 83735 ASSAY OF MAGNESIUM: CPT

## 2025-05-27 PROCEDURE — 84100 ASSAY OF PHOSPHORUS: CPT

## 2025-05-27 PROCEDURE — 80053 COMPREHEN METABOLIC PANEL: CPT

## 2025-05-27 PROCEDURE — 99215 OFFICE O/P EST HI 40 MIN: CPT | Performed by: INTERNAL MEDICINE

## 2025-05-27 PROCEDURE — 36415 COLL VENOUS BLD VENIPUNCTURE: CPT

## 2025-05-27 ASSESSMENT — PATIENT HEALTH QUESTIONNAIRE - PHQ9
1. LITTLE INTEREST OR PLEASURE IN DOING THINGS: NOT AT ALL
SUM OF ALL RESPONSES TO PHQ QUESTIONS 1-9: 0
2. FEELING DOWN, DEPRESSED OR HOPELESS: NOT AT ALL
SUM OF ALL RESPONSES TO PHQ QUESTIONS 1-9: 0

## 2025-05-27 NOTE — PROGRESS NOTES
Madelin Parekh is a 55 y.o. female is here today for a follow up.    1. Have you been to the ER, urgent care clinic since your last visit?  Hospitalized since your last visit?No    2. Have you seen or consulted any other health care providers outside of the [facility] since your last visit?  Include any pap smears or colon screening. No

## 2025-05-27 NOTE — PROGRESS NOTES
Cancer [place] at [facility]  [address], [address] [place] [facility] [address]  W: [phone]  F: [phone]      Reason for Visit:   Madelin Parekh is a 55 y.o. female who is seen in follow up for breast cancer.    Breast Surgeon: Dr. Forrest  Rad onc:  Dr. Camp    Treatment History:   7/18/23 left breast stellate mass, core bx:  IDC, gr 2, 4 mm, ER + at 98%, AK + at 54%, HER 2 negative at IHC 1+ (SOHA ration 1; sig/cell 1.85), ki67 59%, DCIS, no LVI  Mammaprint shows luminal B, high risk, index -0.160  8/1/23 left axilla LN core bx:  + for breast cancer  Genetic testing , negative BRCA 1/2 by [facility] 2021  NSABP FB-12  AC 9/20/23 - 11/1/23  Paclitaxel/trastuzumab/pertuzumab 11/15/23-2/27/24  Held taxol due to ANC 0.9 on c1d1  Held taxol due to ANC 0.7 on C2d1  c3d8 skipped for neutropenia  C3d15 skipped for neutropenia  DR taxol at c4d1 to 65 mg/m2  3/19/24 left breast lumpectomy:  gr 1, 1 cm, DCIS, gr 3, cribriform, + LVI, 1/11 LN + with 0.6 mm micromet, ypT1b ovE1ppe cM0  XRT 5/14/24-7/1/24  Anastrozole 7/8/24- current  Ribociclib 1/21/25- current  DR to 200 mg after first cycle    History of Present Illness:   An abnormal mammogram led to the pathology above.      Interval history:  Patient presents today for follow up and treatment. Reports gr 1 fatigue, gr 1 nausea, gr 1 hot flashes, gr 1 insomnia, gr 1 sob, gr 1 tachycardia, gr 1 headache, gr 1 increase in urinary leakage    FH:  mother with breast cancer, dx at age 83; maternal aunt with breast cancer, dx 50-60s and lung cancer; maternal aunt:  ovarian cancer, dx 40s; father with colon cancer dx 60s    LMP 8/2023    Past Medical History:   Diagnosis Date    Anxiety     Breast cancer (HCC)     Deviated septum     History of therapeutic radiation     Hx antineoplastic chemo     Hx of blood clots     near port a cath area- eliquis therapy    Invasive ductal carcinoma of breast, female, left (HCC) 07/24/2023 7/18/23: LEFT

## 2025-05-29 ENCOUNTER — PATIENT MESSAGE (OUTPATIENT)
Age: 55
End: 2025-05-29

## 2025-06-05 DIAGNOSIS — Z98.890 S/P LUMPECTOMY OF BREAST: Primary | ICD-10-CM

## 2025-06-06 ENCOUNTER — PATIENT MESSAGE (OUTPATIENT)
Age: 55
End: 2025-06-06

## 2025-06-06 ENCOUNTER — CLINICAL DOCUMENTATION (OUTPATIENT)
Age: 55
End: 2025-06-06

## 2025-06-06 NOTE — PROGRESS NOTES
[facility] OUTPATIENT  Physical Therapy Daily Treatment Note/POC Review     Name: Benedicto South  Clinic Number: [id number]    Therapy Diagnosis:   Encounter Diagnoses   Name Primary?    Chronic radicular lumbar pain Yes    SI (sacroiliac) joint dysfunction     Impaired gait and mobility      Physician: Demetrio Ayala MD    Visit Date: 8/19/2020    Physician Orders: PT Eval and Treat   Medical Diagnosis from Referral: M54.16 Radiculopathy, lumbar region     Evaluation Date: 6/10/2020  Authorization Period Expiration: 10/31/2020  Plan of Care Expiration: 8/5/2020 (Updated POC:from 8/6/2020 through 10/2/2020. 2w6 or 12 visits prn,    Visit # / Visits authorized: 10/ 13  (Freq: Ev + 2w4,1w6) (updated for 8 more visits for a total of 20)      Total Visit count: 10    PTA Visit: 0/5      POC Review due by: 9/19/2020      Time In: 1515  Time Out: 1615      Precautions: Standard      Subjective     Pt reports: She has an 5/10 pain today and she states that she continues to only be able to stand at her counter for about 10 minutes and then must bend and seek support or sit to relieve pain.  Moreover she adds that she can only sit for about 30 minutes before having to shift or move due to pain. Jayme also relates that she is ablele to get all of her home chores completed as opposed to when she first started she could not.  She also stated she had follow up with MD who has requested MRI.       She was not compliant with home exercise program.    Response to previous treatment: no soreness   Functional change: N/A      Pain: 8/10  Location: right back , feet , lower legs and upper legs       Objective     BENEDICTO received therapeutic exercises to develop strength, endurance, ROM, flexibility and core stabilization for 50 minutes including:    · NuStep L3 x 6 min   · Hamstring stretch 60 sec x 2  · Piriformis stretch seated 60 sec x 2   · Sciatic nerve glides B LE 10 x 2   · DKTC  Oral Chemotherapy      Madelin Parekh is a 54 y.o.female seen for consultation for pharmacist oral chemotherapy management.      Diagnosis: breast cancer     Medication name: ribociclib (Kisqali) 200 mg tabs  Regimen: two tabs (400 mg total) by mouth every morning for 21 days, then take 7 days off  Start date: 5/27 - 6/16    Ms. Parekh is currently in [place]. Today, she left her Arimidex and Kisqali in a parked car for a period of time. Upon retrieval, the medication packs were warm to the touch but not excessively hot. She sent a [employer] message requesting guidance.    According to  guidelines, Kisqali should be stored in a cool (68-77°F), dark place. While some data suggest stability for up to 2 months at temperatures up to 86°F, temperatures inside a parked car can exceed 100°F even in cooler outdoor weather. Therefore, continued use of the exposed medication may not be safe.    [facility] has been contacted and will have replacement medications ready for pickup on Monday, 6/9. Ms. Parekh was advised to skip doses over the weekend, not to double dose, and to resume her cycle on Monday, ending as originally scheduled on 6/16.    This introduces a challenge to medication adherence, as she will need to borrow from future cycles. Adherence will be monitored during each physician visit.    Yari Leslie, PharmD          with swissball 15 x 2  · LTR with NO swissball today 15 x 2  · PPT with TrA and swissball DKTC 10 x 2 held  · SKTC in sitting 10 x  · Seated prayer stretch with swissball 3 positions 30 sec each  · + Supine glute sets 1x10 with 10s hold - held  · Attempted supine bridges, but pt c/o discomfort in abdomen from recent procedure and unable to tolerate at this time. held        Objective Measurements:    Functional : MOD I: 38/50 = 76% Disability      NP    BENEDICTO received the following direct contact modalities after being cleared for contraindications:N/A    BENEDICTO received the following supervised modalities after being cleared for contradictions: Mechanical Traction:  Benedicto received intermittent mechanical traction to the lumbar spine at a force of 120 pounds hold for 30 seconds with 70 pound rest for 10 sec. for a total of 15 minutes. Patient tolerated treatment well without any adverse effects.  Patient related a pain reduction to a 1/10 following traction.        BENEDICTO received the following supervised modalities after being cleared for contradictions: IFC Electrical Stimulation:  Benedicto received IFC Electrical Stimulation for pain control applied to the R LB. Pt received stimulation with MHP for  0 minutes. Benedicto tolderated treatment well without any adverse effects.        BENEDITCO received hot pack for as above minutes to as above.    BENEDICTO received cold pack for 0 minutes to N/A.      Home Exercises Provided and Patient Education Provided     Education provided:   - Patient was educated on gradual reintegration of HEP as well as when to stop TE.  Patient verbalzied understanding, performed return demonstration and with no adverse affects noted nor verbalized.      - Importance of performing HEP to improve symptoms and overall functional mobility.     Written Home Exercises Provided: Issued updated HEP.   Exercises were reviewed and BENEDICTO was able to demonstrate them prior to the end of the session.  BENEDICTO demonstrated fair   understanding of the education provided.     See EMR under Media for exercises provided 6/30/2020.    Assessment/ POC Review/update     Patient has participated with PT to address her pain, strength activity aquilino and functional mob.  She has made overall progress functionally as she currently is able to complete her day to day activities but still requires recovery time.  This day patient was able to aquilino her noted progressions including LTR without ball.  She reports today a disability score of 76% as per the MOD I and has met  STG 2 while 1,3, and 4 are in progress.  She is expected to steadily improve with cont PT RX in order to improve core and hip strength and activity aquilino and to mitigate pain in order to optimize her function.        BENEDICTO is progressing well towards her goals.   Pt prognosis is Fair.     Pt will continue to benefit from skilled outpatient physical therapy to address the deficits listed in the problem list box on initial evaluation, provide pt/family education and to maximize pt's level of independence in the home and community environment.     Pt's spiritual, cultural and educational needs considered and pt agreeable to plan of care and goals.       Anticipated barriers to physical therapy: Long duration of current problem list, high levels of pain      Goals:     Short Term Goals: 4 weeks   1. Patient will verbalize a reduction of right radicular symptoms to the knee to improve sensation of the right leg during ambulation.   In progress 8/19/2020  2. Patient will report a MARK score of 40% disability to improve her QoL with daily tasks.         Met 8/19/2020  3. Patient will tolerate standing for 30 minutes without increasing levels of pain to allow her to wash the dishes without having to take a rest break in between each. In progress 8/19/2020  4. Patient will tolerate sitting for 2 hours without an increase of pain to allow her to travel moderate distances without difficulty.  In progress  8/19/2020     Long Term Goals: 8 weeks   1. Patient will report a MARK score of 15% disability to improve her QoL with daily tasks.  2. Patient will tolerate standing for 50 minutes without exacerbation of symptoms so she can walk around the grocery store without having to compensate by leaning on the grocery cart.  3. Patient will verbalized a reduction of right radicular symptoms to the mid thigh to improve her sensation of the right leg during ambulation      Plan       Cont POC, progress as aquilino. cont with lumbar traction        POC valid from 8/6/2020 through 10/2/2020. 2w6 or 12 visits prn, with treatments to consist of: Balance (58830): Improve overall coordination and balance, Cardiovascular, Closed Chain Strengthening, Core Stabilization, Flexibility (03311): improve muscle ROM, flexibility and  function, Home Exercise and Stretching, Patient Education, Plyometrics, Postural Awareness and  Training, Postural Stabilization, ROM Exercises (12611) : Passive or active activities to increase joint ROM, Strengthening  (09749): improve muscle strength and function., Therapeutic Exercise (47510): improve muscle strength, ROM, flexibility and muscle function., Gait Training (63140) : Improve overall gait function including stair climbing, Cross Friction Massage, Manual Stretching (56902): passive or active stretching to improve muscle length and function., Strain/Counter-Strain, Manual Traction, Myofascial Release , Peripheral Joint Mobilization, Soft Tissue Mobs (30229): increase ROM, tissue length, joint mechanics and modulate pain., Spine Mobilization  (28380): increase ROM, tissue length, joint mechanics and modulate pain., Massage (07582):, Combo E-Stim/Ultrasound, Cryotherapy (67084: Application of cold to decrease local swelling and decrease pain., Heat - 53377:  Application of heat to increase local circulation and decrease pain., Hi-Volt E-Stim  (): Application of electrical stimulation to  modulate pain., IFC E-Stim (): Application of electrical stimulation to modulate pain., Mechanical Traction (42044), Micro-Current, Premodulated E-Stim  (): Application of electrical stimulation to modulate pain., TENs E-Stim  (): Application of electrical stimulation to modulate pain., Ultrasound (85477): increase local circulation, improve tissue healing time and modulate pain., Whirlpool (98119): increase local tissue circulation, improve elasticity of tissues, increased blood flow for improved muscle strength, ROM, flexiblity, and function., NMES E-stim ():  Application of electrical stimulation for motor learning and control., Iontophoresis (33149), NMR (05518): re-education of movement, balance, coordination, kinesthetic sense, posture and proprioception, Self Care & Home Management (00513) - Self-care/home management training (e.g., activities of daily living [ADL] and compensatory training, meal preparation, safety procedures, and instructions in use of assistive technology devices/adaptive equipment), direct one-on-one contact (15 minutes), Therapeutic Activity (48347):  Use of dynamic activities to improve functional performance..            I CERTIFY THE NEED FOR THESE SERVICES FURNISHED UNDER THIS PLAN OF TREATMENT AND WHILE UNDER MY CARE     Physician's comments:           Physician's Signature: ___________________________________________________                   Patricio Lipscomb, PT

## 2025-06-09 ENCOUNTER — APPOINTMENT (OUTPATIENT)
Facility: HOSPITAL | Age: 55
End: 2025-06-09
Payer: COMMERCIAL

## 2025-06-16 ENCOUNTER — APPOINTMENT (OUTPATIENT)
Facility: HOSPITAL | Age: 55
End: 2025-06-16
Payer: COMMERCIAL

## 2025-06-16 ENCOUNTER — PATIENT MESSAGE (OUTPATIENT)
Age: 55
End: 2025-06-16

## 2025-06-18 NOTE — PROGRESS NOTES
Cancer [place] at [facility]  [address], [address] [facility] [address]  W: [phone]  F: [phone]      Reason for Visit:   Madelin Parekh is a 55 y.o. female who is seen in follow up for breast cancer.    Breast Surgeon: Dr. Forrest  Rad onc:  Dr. Camp    Treatment History:   7/18/23 left breast stellate mass, core bx:  IDC, gr 2, 4 mm, ER + at 98%, [place] + at 54%, HER 2 negative at IHC 1+ (SOHA ration 1; sig/cell 1.85), ki67 59%, DCIS, no LVI  Mammaprint shows luminal B, high risk, index -0.160  8/1/23 left axilla LN core bx:  + for breast cancer  Genetic testing , negative BRCA 1/2 by [email or web address] 2021  NSABP FB-12  AC 9/20/23 - 11/1/23  Paclitaxel/trastuzumab/pertuzumab 11/15/23-2/27/24  Held taxol due to ANC 0.9 on c1d1  Held taxol due to ANC 0.7 on C2d1  c3d8 skipped for neutropenia  C3d15 skipped for neutropenia  DR taxol at c4d1 to 65 mg/m2  3/19/24 left breast lumpectomy:  gr 1, 1 cm, DCIS, gr 3, cribriform, + LVI, 1/11 LN + with 0.6 mm micromet, ypT1b [id number] cM0  XRT 5/14/24-7/1/24  Anastrozole 7/8/24- current  Ribociclib 1/21/25- current  DR to 200 mg after first cycle    History of Present Illness:   An abnormal mammogram led to the pathology above.      Interval history:  Patient presents today for follow up and treatment. Reports gr 1 fatigue, gr 1 nausea, gr 1 hot flashes, gr 2 insomnia, gr 1 anxiety, gr 1 dyspnea, gr 1 tachycardia, gr 1 vaginal dryness.     FH:  mother with breast cancer, dx at age 83; maternal aunt with breast cancer, dx 50-60s and lung cancer; maternal aunt:  ovarian cancer, dx 40s; father with colon cancer dx 60s    LMP 8/2023    Past Medical History:   Diagnosis Date    Anxiety     Breast cancer (HCC)     Deviated septum     History of therapeutic radiation     Hx antineoplastic chemo     Hx of blood clots     near port a cath area- eliquis therapy    Invasive ductal carcinoma of breast, female, left (HCC) 07/24/2023 7/18/23: LEFT breast,

## 2025-06-23 ENCOUNTER — OFFICE VISIT (OUTPATIENT)
Age: 55
End: 2025-06-23
Payer: COMMERCIAL

## 2025-06-23 ENCOUNTER — HOSPITAL ENCOUNTER (OUTPATIENT)
Facility: HOSPITAL | Age: 55
Setting detail: INFUSION SERIES
Discharge: HOME | End: 2025-06-23
Payer: COMMERCIAL

## 2025-06-23 VITALS
BODY MASS INDEX: 18.27 KG/M2 | WEIGHT: 107 LBS | HEIGHT: 64 IN | TEMPERATURE: 98.1 F | RESPIRATION RATE: 17 BRPM | HEART RATE: 77 BPM | SYSTOLIC BLOOD PRESSURE: 121 MMHG | DIASTOLIC BLOOD PRESSURE: 73 MMHG | OXYGEN SATURATION: 99 %

## 2025-06-23 VITALS
OXYGEN SATURATION: 99 % | RESPIRATION RATE: 16 BRPM | HEART RATE: 99 BPM | SYSTOLIC BLOOD PRESSURE: 98 MMHG | DIASTOLIC BLOOD PRESSURE: 54 MMHG | TEMPERATURE: 97.5 F

## 2025-06-23 DIAGNOSIS — C50.112 MALIGNANT NEOPLASM OF CENTRAL PORTION OF LEFT BREAST IN FEMALE, ESTROGEN RECEPTOR POSITIVE (HCC): Primary | ICD-10-CM

## 2025-06-23 DIAGNOSIS — Z51.81 ENCOUNTER FOR MONITORING CARDIOTOXIC DRUG THERAPY: Primary | ICD-10-CM

## 2025-06-23 DIAGNOSIS — C50.912 INVASIVE DUCTAL CARCINOMA OF BREAST, FEMALE, LEFT (HCC): ICD-10-CM

## 2025-06-23 DIAGNOSIS — Z79.899 ENCOUNTER FOR MONITORING CARDIOTOXIC DRUG THERAPY: Primary | ICD-10-CM

## 2025-06-23 DIAGNOSIS — Z51.81 ENCOUNTER FOR THERAPEUTIC DRUG MONITORING: ICD-10-CM

## 2025-06-23 DIAGNOSIS — Z17.0 MALIGNANT NEOPLASM OF CENTRAL PORTION OF LEFT BREAST IN FEMALE, ESTROGEN RECEPTOR POSITIVE (HCC): Primary | ICD-10-CM

## 2025-06-23 LAB
ALBUMIN SERPL-MCNC: 4 G/DL (ref 3.5–5)
ALBUMIN/GLOB SERPL: 1.3 (ref 1.1–2.2)
ALP SERPL-CCNC: 45 U/L (ref 45–117)
ALT SERPL-CCNC: 23 U/L (ref 12–78)
ANION GAP SERPL CALC-SCNC: 8 MMOL/L (ref 2–12)
AST SERPL-CCNC: 25 U/L (ref 15–37)
BASOPHILS # BLD: 0 K/UL (ref 0–0.1)
BASOPHILS NFR BLD: 0 % (ref 0–1)
BILIRUB SERPL-MCNC: 0.7 MG/DL (ref 0.2–1)
BUN SERPL-MCNC: 20 MG/DL (ref 6–20)
BUN/CREAT SERPL: 29 (ref 12–20)
CALCIUM SERPL-MCNC: 9.5 MG/DL (ref 8.5–10.1)
CHLORIDE SERPL-SCNC: 107 MMOL/L (ref 97–108)
CO2 SERPL-SCNC: 28 MMOL/L (ref 21–32)
CREAT SERPL-MCNC: 0.68 MG/DL (ref 0.55–1.02)
DIFFERENTIAL METHOD BLD: ABNORMAL
EOSINOPHIL # BLD: 0.06 K/UL (ref 0–0.4)
EOSINOPHIL NFR BLD: 3 % (ref 0–7)
ERYTHROCYTE [DISTWIDTH] IN BLOOD BY AUTOMATED COUNT: 12.3 % (ref 11.5–14.5)
GLOBULIN SER CALC-MCNC: 3.1 G/DL (ref 2–4)
GLUCOSE SERPL-MCNC: 102 MG/DL (ref 65–100)
HCT VFR BLD AUTO: 36.7 % (ref 35–47)
HGB BLD-MCNC: 12.6 G/DL (ref 11.5–16)
IMM GRANULOCYTES # BLD AUTO: 0 K/UL
IMM GRANULOCYTES NFR BLD AUTO: 0 %
LYMPHOCYTES # BLD: 0.52 K/UL (ref 0.8–3.5)
LYMPHOCYTES NFR BLD: 26 % (ref 12–49)
MAGNESIUM SERPL-MCNC: 2.2 MG/DL (ref 1.6–2.4)
MCH RBC QN AUTO: 32 PG (ref 26–34)
MCHC RBC AUTO-ENTMCNC: 34.3 G/DL (ref 30–36.5)
MCV RBC AUTO: 93.1 FL (ref 80–99)
MONOCYTES # BLD: 0.28 K/UL (ref 0–1)
MONOCYTES NFR BLD: 14 % (ref 5–13)
NEUTS SEG # BLD: 1.14 K/UL (ref 1.8–8)
NEUTS SEG NFR BLD: 57 % (ref 32–75)
NRBC # BLD: 0 K/UL (ref 0–0.01)
NRBC BLD-RTO: 0 PER 100 WBC
PHOSPHATE SERPL-MCNC: 2.9 MG/DL (ref 2.6–4.7)
PLATELET # BLD AUTO: 149 K/UL (ref 150–400)
PMV BLD AUTO: 8.6 FL (ref 8.9–12.9)
POTASSIUM SERPL-SCNC: 3.6 MMOL/L (ref 3.5–5.1)
PROT SERPL-MCNC: 7.1 G/DL (ref 6.4–8.2)
RBC # BLD AUTO: 3.94 M/UL (ref 3.8–5.2)
RBC MORPH BLD: ABNORMAL
SODIUM SERPL-SCNC: 143 MMOL/L (ref 136–145)
WBC # BLD AUTO: 2 K/UL (ref 3.6–11)

## 2025-06-23 PROCEDURE — 84100 ASSAY OF PHOSPHORUS: CPT

## 2025-06-23 PROCEDURE — 80053 COMPREHEN METABOLIC PANEL: CPT

## 2025-06-23 PROCEDURE — 99215 OFFICE O/P EST HI 40 MIN: CPT | Performed by: NURSE PRACTITIONER

## 2025-06-23 PROCEDURE — 83735 ASSAY OF MAGNESIUM: CPT

## 2025-06-23 PROCEDURE — 85025 COMPLETE CBC W/AUTO DIFF WBC: CPT

## 2025-06-23 PROCEDURE — 36415 COLL VENOUS BLD VENIPUNCTURE: CPT

## 2025-06-23 ASSESSMENT — PATIENT HEALTH QUESTIONNAIRE - PHQ9
SUM OF ALL RESPONSES TO PHQ QUESTIONS 1-9: 0
2. FEELING DOWN, DEPRESSED OR HOPELESS: NOT AT ALL
SUM OF ALL RESPONSES TO PHQ QUESTIONS 1-9: 0
1. LITTLE INTEREST OR PLEASURE IN DOING THINGS: NOT AT ALL
SUM OF ALL RESPONSES TO PHQ QUESTIONS 1-9: 0
SUM OF ALL RESPONSES TO PHQ QUESTIONS 1-9: 0

## 2025-07-07 ENCOUNTER — APPOINTMENT (OUTPATIENT)
Facility: HOSPITAL | Age: 55
End: 2025-07-07
Payer: COMMERCIAL

## 2025-07-08 DIAGNOSIS — T45.1X5A CHEMOTHERAPY INDUCED NEUTROPENIA: Primary | ICD-10-CM

## 2025-07-08 DIAGNOSIS — D70.1 CHEMOTHERAPY INDUCED NEUTROPENIA: Primary | ICD-10-CM

## 2025-07-08 DIAGNOSIS — C50.412 MALIGNANT NEOPLASM OF UPPER-OUTER QUADRANT OF LEFT BREAST IN FEMALE, ESTROGEN RECEPTOR POSITIVE (HCC): ICD-10-CM

## 2025-07-08 DIAGNOSIS — Z17.0 MALIGNANT NEOPLASM OF UPPER-OUTER QUADRANT OF LEFT BREAST IN FEMALE, ESTROGEN RECEPTOR POSITIVE (HCC): ICD-10-CM

## 2025-07-08 DIAGNOSIS — M85.89 OSTEOPENIA OF MULTIPLE SITES: ICD-10-CM

## 2025-07-08 RX ORDER — SODIUM CHLORIDE 9 MG/ML
INJECTION, SOLUTION INTRAVENOUS CONTINUOUS
OUTPATIENT
Start: 2025-07-13

## 2025-07-08 RX ORDER — ACETAMINOPHEN 325 MG/1
650 TABLET ORAL
OUTPATIENT
Start: 2025-07-13

## 2025-07-08 RX ORDER — ONDANSETRON 2 MG/ML
8 INJECTION INTRAMUSCULAR; INTRAVENOUS
OUTPATIENT
Start: 2025-07-13

## 2025-07-08 RX ORDER — SODIUM CHLORIDE 0.9 % (FLUSH) 0.9 %
5-40 SYRINGE (ML) INJECTION PRN
OUTPATIENT
Start: 2025-07-13

## 2025-07-08 RX ORDER — HYDROCORTISONE SODIUM SUCCINATE 100 MG/2ML
100 INJECTION INTRAMUSCULAR; INTRAVENOUS
OUTPATIENT
Start: 2025-07-13

## 2025-07-08 RX ORDER — ZOLEDRONIC ACID 0.04 MG/ML
4 INJECTION, SOLUTION INTRAVENOUS ONCE
OUTPATIENT
Start: 2025-07-13 | End: 2025-07-13

## 2025-07-08 RX ORDER — SODIUM CHLORIDE 9 MG/ML
5-250 INJECTION, SOLUTION INTRAVENOUS PRN
OUTPATIENT
Start: 2025-07-13

## 2025-07-08 RX ORDER — HEPARIN 100 UNIT/ML
500 SYRINGE INTRAVENOUS PRN
OUTPATIENT
Start: 2025-07-13

## 2025-07-08 RX ORDER — ALBUTEROL SULFATE 90 UG/1
4 INHALANT RESPIRATORY (INHALATION) PRN
OUTPATIENT
Start: 2025-07-13

## 2025-07-08 RX ORDER — DIPHENHYDRAMINE HYDROCHLORIDE 50 MG/ML
50 INJECTION, SOLUTION INTRAMUSCULAR; INTRAVENOUS
OUTPATIENT
Start: 2025-07-13

## 2025-07-08 RX ORDER — EPINEPHRINE 1 MG/ML
0.3 INJECTION, SOLUTION, CONCENTRATE INTRAVENOUS PRN
OUTPATIENT
Start: 2025-07-13

## 2025-07-09 ENCOUNTER — PATIENT MESSAGE (OUTPATIENT)
Age: 55
End: 2025-07-09

## 2025-07-14 ENCOUNTER — OFFICE VISIT (OUTPATIENT)
Age: 55
End: 2025-07-14
Payer: COMMERCIAL

## 2025-07-14 ENCOUNTER — APPOINTMENT (OUTPATIENT)
Facility: HOSPITAL | Age: 55
End: 2025-07-14
Payer: COMMERCIAL

## 2025-07-14 VITALS — HEIGHT: 64 IN | WEIGHT: 108 LBS | BODY MASS INDEX: 18.44 KG/M2

## 2025-07-14 DIAGNOSIS — Z91.89 AT HIGH RISK FOR BREAST CANCER: Primary | ICD-10-CM

## 2025-07-14 DIAGNOSIS — Z85.3 HISTORY OF LEFT BREAST CANCER: ICD-10-CM

## 2025-07-14 PROCEDURE — 99213 OFFICE O/P EST LOW 20 MIN: CPT | Performed by: SURGERY

## 2025-07-14 PROCEDURE — 76642 ULTRASOUND BREAST LIMITED: CPT | Performed by: SURGERY

## 2025-07-14 NOTE — PROGRESS NOTES
HISTORY OF PRESENT ILLNESS  Madelin Parekh is a 55 y.o. female     HPI ESTABLISHED patient here to follow up on LEFT breast cancer and discuss screening, surgery and screening for her daughters. Patient does not have any breast issues at this time.     MRI Result (most recent):  MRI BREAST BILATERAL W WO CONTRAST 02/10/2025    Narrative  HISTORY: 54-year-old female with a history of left breast DCIS, status post  diagnosis in 2023 and lumpectomy in 2024, together with radiation and  chemotherapy. She presents for breast MRI.    COMPARISON: Mammography January 2025. No prior breast MRI    TECHNIQUE:  Bilateral breast MRI was performed using a dedicated breast coil without  compression with the patient in the prone position. Precontrast T1-weighted  images with fat suppression were obtained followed by bolus injection of 5 mL  Gadavist. Postcontrast dynamic and high-resolution images were acquired.  T2-weighted axial imaging with fat suppression was also performed. The images  were analyzed using CAD analysis, enhancement curves, digital subtraction, and 2  and 3 dimensional reconstructions.    FINDINGS:    Background parenchymal enhancement: Mild    Mammographic breast density: Heterogeneously dense breast parenchyma    Left breast:  There are benign lumpectomy changes in the left breast. There is no evidence of  recurrent malignancy. There is no suspicious mass or non-mass enhancement.    There is no suspicious axillary or internal mammary chain lymphadenopathy.    Right breast:  There is no suspicious mass or non-mass enhancement within the right breast.    There is no suspicious axillary or internal mammary chain lymphadenopathy.    Incidentally noted is a pectus excavatum of the sternum.    Impression  Right Breast:  1. BI-RADS Assessment Category 1: Negative. No evidence of breast carcinoma  within the right breast.    Left Breast:  1. BI-RADS Assessment Category 2: Benign finding. Benign lumpectomy changes

## 2025-07-14 NOTE — PROGRESS NOTES
HISTORY OF PRESENT ILLNESS  Madelin Parekh is a 55 y.o. female.    HPI ESTABLISHED patient here to follow up on LEFT breast cancer and discuss screening, surgery and screening for her daughters. Patient does not have any breast issues at this time.      MRI Result (most recent):  MRI BREAST BILATERAL W WO CONTRAST 02/10/2025     Narrative  HISTORY: 54-year-old female with a history of left breast DCIS, status post  diagnosis in 2023 and lumpectomy in 2024, together with radiation and  chemotherapy. She presents for breast MRI.     COMPARISON: Mammography January 2025. No prior breast MRI     TECHNIQUE:  Bilateral breast MRI was performed using a dedicated breast coil without  compression with the patient in the prone position. Precontrast T1-weighted  images with fat suppression were obtained followed by bolus injection of 5 mL  Gadavist. Postcontrast dynamic and high-resolution images were acquired.  T2-weighted axial imaging with fat suppression was also performed. The images  were analyzed using CAD analysis, enhancement curves, digital subtraction, and 2  and 3 dimensional reconstructions.     FINDINGS:     Background parenchymal enhancement: Mild     Mammographic breast density: Heterogeneously dense breast parenchyma     Left breast:  There are benign lumpectomy changes in the left breast. There is no evidence of  recurrent malignancy. There is no suspicious mass or non-mass enhancement.     There is no suspicious axillary or internal mammary chain lymphadenopathy.     Right breast:  There is no suspicious mass or non-mass enhancement within the right breast.     There is no suspicious axillary or internal mammary chain lymphadenopathy.     Incidentally noted is a pectus excavatum of the sternum.     Impression  Right Breast:  1. BI-RADS Assessment Category 1: Negative. No evidence of breast carcinoma  within the right breast.     Left Breast:  1. BI-RADS Assessment Category 2: Benign finding. Benign

## 2025-07-15 ENCOUNTER — HOSPITAL ENCOUNTER (OUTPATIENT)
Facility: HOSPITAL | Age: 55
Setting detail: INFUSION SERIES
Discharge: HOME | End: 2025-07-15
Payer: COMMERCIAL

## 2025-07-15 VITALS
TEMPERATURE: 98.1 F | WEIGHT: 109 LBS | DIASTOLIC BLOOD PRESSURE: 62 MMHG | SYSTOLIC BLOOD PRESSURE: 98 MMHG | RESPIRATION RATE: 18 BRPM | BODY MASS INDEX: 18.61 KG/M2 | OXYGEN SATURATION: 97 % | HEIGHT: 64 IN | HEART RATE: 67 BPM

## 2025-07-15 DIAGNOSIS — M85.89 OSTEOPENIA OF MULTIPLE SITES: Primary | ICD-10-CM

## 2025-07-15 DIAGNOSIS — Z17.0 MALIGNANT NEOPLASM OF UPPER-OUTER QUADRANT OF LEFT BREAST IN FEMALE, ESTROGEN RECEPTOR POSITIVE (HCC): ICD-10-CM

## 2025-07-15 DIAGNOSIS — C50.412 MALIGNANT NEOPLASM OF UPPER-OUTER QUADRANT OF LEFT BREAST IN FEMALE, ESTROGEN RECEPTOR POSITIVE (HCC): ICD-10-CM

## 2025-07-15 DIAGNOSIS — D70.1 CHEMOTHERAPY INDUCED NEUTROPENIA: ICD-10-CM

## 2025-07-15 DIAGNOSIS — T45.1X5A CHEMOTHERAPY INDUCED NEUTROPENIA: ICD-10-CM

## 2025-07-15 LAB
ANION GAP BLD CALC-SCNC: 7 MMOL/L (ref 10–20)
CA-I BLD-MCNC: 1.21 MMOL/L (ref 1.15–1.33)
CHLORIDE BLD-SCNC: 103 MMOL/L (ref 98–107)
CO2 BLD-SCNC: 29 MMOL/L (ref 21–32)
CREAT BLD-MCNC: 0.79 MG/DL (ref 0.6–1.3)
GLUCOSE BLD-MCNC: 118 MG/DL (ref 74–99)
POTASSIUM BLD-SCNC: 3.4 MMOL/L (ref 3.5–5.1)
SERVICE CMNT-IMP: ABNORMAL
SODIUM BLD-SCNC: 139 MMOL/L (ref 136–145)

## 2025-07-15 PROCEDURE — 6360000002 HC RX W HCPCS: Performed by: NURSE PRACTITIONER

## 2025-07-15 PROCEDURE — 96365 THER/PROPH/DIAG IV INF INIT: CPT

## 2025-07-15 PROCEDURE — 80047 BASIC METABLC PNL IONIZED CA: CPT

## 2025-07-15 RX ORDER — EPINEPHRINE 1 MG/ML
0.3 INJECTION, SOLUTION INTRAMUSCULAR; SUBCUTANEOUS PRN
OUTPATIENT
Start: 2026-01-11

## 2025-07-15 RX ORDER — SODIUM CHLORIDE 9 MG/ML
5-250 INJECTION, SOLUTION INTRAVENOUS PRN
Status: DISCONTINUED | OUTPATIENT
Start: 2025-07-15 | End: 2025-07-16 | Stop reason: HOSPADM

## 2025-07-15 RX ORDER — ZOLEDRONIC ACID 0.04 MG/ML
4 INJECTION, SOLUTION INTRAVENOUS ONCE
OUTPATIENT
Start: 2026-01-11 | End: 2026-01-11

## 2025-07-15 RX ORDER — DIPHENHYDRAMINE HYDROCHLORIDE 50 MG/ML
50 INJECTION, SOLUTION INTRAMUSCULAR; INTRAVENOUS
OUTPATIENT
Start: 2026-01-11

## 2025-07-15 RX ORDER — HEPARIN 100 UNIT/ML
500 SYRINGE INTRAVENOUS PRN
OUTPATIENT
Start: 2026-01-11

## 2025-07-15 RX ORDER — SODIUM CHLORIDE 0.9 % (FLUSH) 0.9 %
5-40 SYRINGE (ML) INJECTION PRN
OUTPATIENT
Start: 2026-01-11

## 2025-07-15 RX ORDER — SODIUM CHLORIDE 9 MG/ML
5-250 INJECTION, SOLUTION INTRAVENOUS PRN
OUTPATIENT
Start: 2026-01-11

## 2025-07-15 RX ORDER — ACETAMINOPHEN 325 MG/1
650 TABLET ORAL
OUTPATIENT
Start: 2026-01-11

## 2025-07-15 RX ORDER — ALBUTEROL SULFATE 90 UG/1
4 INHALANT RESPIRATORY (INHALATION) PRN
OUTPATIENT
Start: 2026-01-11

## 2025-07-15 RX ORDER — ZOLEDRONIC ACID 0.04 MG/ML
4 INJECTION, SOLUTION INTRAVENOUS ONCE
Status: COMPLETED | OUTPATIENT
Start: 2025-07-15 | End: 2025-07-15

## 2025-07-15 RX ORDER — ONDANSETRON 2 MG/ML
8 INJECTION INTRAMUSCULAR; INTRAVENOUS
OUTPATIENT
Start: 2026-01-11

## 2025-07-15 RX ORDER — HYDROCORTISONE SODIUM SUCCINATE 100 MG/2ML
100 INJECTION INTRAMUSCULAR; INTRAVENOUS
OUTPATIENT
Start: 2026-01-11

## 2025-07-15 RX ORDER — FAMOTIDINE 10 MG/ML
20 INJECTION, SOLUTION INTRAVENOUS
OUTPATIENT
Start: 2026-01-11

## 2025-07-15 RX ORDER — SODIUM CHLORIDE 9 MG/ML
INJECTION, SOLUTION INTRAVENOUS CONTINUOUS
OUTPATIENT
Start: 2026-01-11

## 2025-07-15 RX ADMIN — ZOLEDRONIC ACID 4 MG: 0.04 INJECTION, SOLUTION INTRAVENOUS at 12:05

## 2025-07-15 ASSESSMENT — PAIN SCALES - GENERAL: PAINLEVEL_OUTOF10: 0

## 2025-07-15 NOTE — PROGRESS NOTES
[facility] Progress Note    Date: July 15, 2025    Name: Madelin Parekh    MRN: [id number]         : 1970    Ms. Parekh Arrived ambulatory and in no distress for [facility].  Assessment was completed, no acute issues at this time, no new complaints voiced.  24 G PIV established to right arm, + blood return. Labs drawn and sent for processing.    Ms. Parekh's vitals were reviewed.  Vitals:    07/15/25 1145   BP: 98/62   Pulse: 67   Resp: 18   Temp: 98.1 °F (36.7 °C)   SpO2: 97%       Lab results were obtained and reviewed.  Recent Results (from the past 12 hours)   POC CHEM 8    Collection Time: 07/15/25 11:52 AM   Result Value Ref Range    POC Ionized Calcium 1.21 1.15 - 1.33 mmol/L    POC Sodium 139 136 - 145 mmol/L    POC Potassium 3.4 (L) 3.5 - 5.1 mmol/L    POC Chloride 103 98 - 107 mmol/L    POC TCO2 29.0 21 - 32 mmol/L    Anion Gap, POC 7 (L) 10 - 20 mmol/L    POC Glucose 118 (H) 74 - 99 mg/dL    POC Creatinine 0.79 0.6 - 1.3 mg/dL    eGFR, POC 89 >60 ml/min/1.73m2    UA Comment Comment Not Indicated.         Medications:  Medications Administered         zoledronic acid (ZOMETA) 4 mg/100 mL infusion Admin Date  07/15/2025 Action  New Bag Dose  4 mg Rate  300 mL/hr Route  IntraVENous Documented By  Kathy Dallas, RN            Ms. Parekh tolerated treatment well and [facility].   PIV flushed & removed. Patient is aware of future appointments.    Future Appointments   Date Time Provider Department Center   2025  8:00 AM SS LAB CHAIR [facility]   2025  8:30 AM Chintan Matt MD [facility] AMB   2025 11:30 AM SS LAB CHAIR [facility]   2025  9:00 AM [facility]add[facility]   9/15/2025  8:30 AM SS LAB CHAIR [facility]   2026  9:00 AM Neelima Floyd MD [facility] AMB   2026  8:30 AM ZACKARY Ocasio MD [facility]       Kathy Dallas RN  July 15, 2025

## 2025-07-16 ENCOUNTER — CLINICAL DOCUMENTATION (OUTPATIENT)
Age: 55
End: 2025-07-16

## 2025-07-16 NOTE — PROGRESS NOTES
Received HELLO message from patient that she had questions regarding her visit on 7/14/25. I returned the patient's call. NO answer and VM does not identify name. Left message to call our office and send MyChart message with questions.

## 2025-07-17 ENCOUNTER — TELEPHONE (OUTPATIENT)
Facility: HOSPITAL | Age: 55
End: 2025-07-17

## 2025-07-17 NOTE — TELEPHONE ENCOUNTER
[facility]  Breast Navigator Encounter    Name:    Madelin Parekh  Age:    55 y.o.  Diagnosis:   Hx of LEFT breast cancer    Received an e-mail from the patient:      Dr. FISCHER said in another message that there is no fixed number for a local recurrence on the right side and that it is cumulative about .5 percent each year which I understand now. My only question is the 18% bilateral risk divided by 9. Not sure what that means? I also am wondering what my chances are of developing a new primary in either breast cancer with a lumpectomy versus a mastectomy?  I am wondering if you could clarify that with him. I know I am probably driving him crazy.     I responded after talking to Dr. Forrest:    I am going to try to answer the question below.  I just spoke to Dr. Forrest.   You have a .5%/year risk of a new primary in RIGHT breast, which is cumulative and changes.  You have about a 2% risk of a new primary in the LEFT breast where you had cancer if you have a mastectomy, and this number does not change.                               ALLEN WILLIAM, RN, BSN, [facility]  BREAST CANCER NURSE NAVIGATOR  [address], [place]  [address]  [email or web address]  o: [phone]  f: [phone]

## 2025-07-21 ENCOUNTER — HOSPITAL ENCOUNTER (OUTPATIENT)
Facility: HOSPITAL | Age: 55
Setting detail: INFUSION SERIES
Discharge: HOME | End: 2025-07-21
Payer: COMMERCIAL

## 2025-07-21 ENCOUNTER — OFFICE VISIT (OUTPATIENT)
Age: 55
End: 2025-07-21
Payer: COMMERCIAL

## 2025-07-21 VITALS
TEMPERATURE: 98.1 F | DIASTOLIC BLOOD PRESSURE: 62 MMHG | RESPIRATION RATE: 18 BRPM | SYSTOLIC BLOOD PRESSURE: 98 MMHG | HEART RATE: 67 BPM | OXYGEN SATURATION: 97 %

## 2025-07-21 VITALS — BODY MASS INDEX: 18.44 KG/M2 | WEIGHT: 108 LBS | HEIGHT: 64 IN

## 2025-07-21 DIAGNOSIS — Z51.81 ENCOUNTER FOR MONITORING CARDIOTOXIC DRUG THERAPY: Primary | ICD-10-CM

## 2025-07-21 DIAGNOSIS — Z51.81 ENCOUNTER FOR THERAPEUTIC DRUG MONITORING: ICD-10-CM

## 2025-07-21 DIAGNOSIS — C50.912 INVASIVE DUCTAL CARCINOMA OF BREAST, FEMALE, LEFT (HCC): ICD-10-CM

## 2025-07-21 DIAGNOSIS — Z79.899 ENCOUNTER FOR MONITORING CARDIOTOXIC DRUG THERAPY: Primary | ICD-10-CM

## 2025-07-21 DIAGNOSIS — C50.112 MALIGNANT NEOPLASM OF CENTRAL PORTION OF LEFT BREAST IN FEMALE, ESTROGEN RECEPTOR POSITIVE (HCC): Primary | ICD-10-CM

## 2025-07-21 DIAGNOSIS — Z17.0 MALIGNANT NEOPLASM OF CENTRAL PORTION OF LEFT BREAST IN FEMALE, ESTROGEN RECEPTOR POSITIVE (HCC): Primary | ICD-10-CM

## 2025-07-21 LAB
ALBUMIN SERPL-MCNC: 3.7 G/DL (ref 3.5–5)
ALBUMIN/GLOB SERPL: 1.2 (ref 1.1–2.2)
ALP SERPL-CCNC: 46 U/L (ref 45–117)
ALT SERPL-CCNC: 22 U/L (ref 12–78)
ANION GAP SERPL CALC-SCNC: 3 MMOL/L (ref 2–12)
AST SERPL-CCNC: 23 U/L (ref 15–37)
BASOPHILS # BLD: 0.1 K/UL (ref 0–0.1)
BASOPHILS NFR BLD: 5 % (ref 0–1)
BILIRUB SERPL-MCNC: 0.5 MG/DL (ref 0.2–1)
BUN SERPL-MCNC: 17 MG/DL (ref 6–20)
BUN/CREAT SERPL: 28 (ref 12–20)
CALCIUM SERPL-MCNC: 9 MG/DL (ref 8.5–10.1)
CHLORIDE SERPL-SCNC: 109 MMOL/L (ref 97–108)
CO2 SERPL-SCNC: 29 MMOL/L (ref 21–32)
CREAT SERPL-MCNC: 0.61 MG/DL (ref 0.55–1.02)
DIFFERENTIAL METHOD BLD: ABNORMAL
EOSINOPHIL # BLD: 0.04 K/UL (ref 0–0.4)
EOSINOPHIL NFR BLD: 2 % (ref 0–7)
ERYTHROCYTE [DISTWIDTH] IN BLOOD BY AUTOMATED COUNT: 12.9 % (ref 11.5–14.5)
GLOBULIN SER CALC-MCNC: 3.1 G/DL (ref 2–4)
GLUCOSE SERPL-MCNC: 90 MG/DL (ref 65–100)
HCT VFR BLD AUTO: 35.6 % (ref 35–47)
HGB BLD-MCNC: 12.5 G/DL (ref 11.5–16)
IMM GRANULOCYTES # BLD AUTO: 0 K/UL (ref 0–0.04)
IMM GRANULOCYTES NFR BLD AUTO: 0 % (ref 0–0.5)
LYMPHOCYTES # BLD: 0.68 K/UL (ref 0.8–3.5)
LYMPHOCYTES NFR BLD: 36 % (ref 12–49)
MAGNESIUM SERPL-MCNC: 2.1 MG/DL (ref 1.6–2.4)
MCH RBC QN AUTO: 33.5 PG (ref 26–34)
MCHC RBC AUTO-ENTMCNC: 35.1 G/DL (ref 30–36.5)
MCV RBC AUTO: 95.4 FL (ref 80–99)
MONOCYTES # BLD: 0.27 K/UL (ref 0–1)
MONOCYTES NFR BLD: 14 % (ref 5–13)
NEUTS SEG # BLD: 0.81 K/UL (ref 1.8–8)
NEUTS SEG NFR BLD: 43 % (ref 32–75)
NRBC # BLD: 0 K/UL (ref 0–0.01)
NRBC BLD-RTO: 0 PER 100 WBC
PHOSPHATE SERPL-MCNC: 3 MG/DL (ref 2.6–4.7)
PLATELET # BLD AUTO: 135 K/UL (ref 150–400)
PMV BLD AUTO: 8.6 FL (ref 8.9–12.9)
POTASSIUM SERPL-SCNC: 3.6 MMOL/L (ref 3.5–5.1)
PROT SERPL-MCNC: 6.8 G/DL (ref 6.4–8.2)
RBC # BLD AUTO: 3.73 M/UL (ref 3.8–5.2)
RBC MORPH BLD: ABNORMAL
SODIUM SERPL-SCNC: 141 MMOL/L (ref 136–145)
WBC # BLD AUTO: 1.9 K/UL (ref 3.6–11)

## 2025-07-21 PROCEDURE — 80053 COMPREHEN METABOLIC PANEL: CPT

## 2025-07-21 PROCEDURE — 85025 COMPLETE CBC W/AUTO DIFF WBC: CPT

## 2025-07-21 PROCEDURE — 84100 ASSAY OF PHOSPHORUS: CPT

## 2025-07-21 PROCEDURE — 99215 OFFICE O/P EST HI 40 MIN: CPT | Performed by: NURSE PRACTITIONER

## 2025-07-21 PROCEDURE — 83735 ASSAY OF MAGNESIUM: CPT

## 2025-07-21 PROCEDURE — 36415 COLL VENOUS BLD VENIPUNCTURE: CPT

## 2025-07-21 NOTE — PROGRESS NOTES
Cancer [place] at [facility]  [address], [address] [facility] [address]  W: [phone]  F: [phone]      Reason for Visit:   Madelin Parekh is a 55 y.o. female who is seen in follow up for breast cancer.    Breast Surgeon: Dr. Forrest  Rad onc:  Dr. Camp    Treatment History:   7/18/23 left breast stellate mass, core bx:  IDC, gr 2, 4 mm, ER + at 98%, [place] + at 54%, HER 2 negative at IHC 1+ (SOHA ration 1; sig/cell 1.85), ki67 59%, DCIS, no LVI  Mammaprint shows luminal B, high risk, index -0.160  8/1/23 left axilla LN core bx:  + for breast cancer  Genetic testing , negative BRCA 1/2 by [employer] 2021  NSABP FB-12  AC 9/20/23 - 11/1/23  Paclitaxel/trastuzumab/pertuzumab 11/15/23-2/27/24  Held taxol due to ANC 0.9 on c1d1  Held taxol due to ANC 0.7 on C2d1  c3d8 skipped for neutropenia  C3d15 skipped for neutropenia  DR taxol at c4d1 to 65 mg/m2  3/19/24 left breast lumpectomy:  gr 1, 1 cm, DCIS, gr 3, cribriform, + LVI, 1/11 LN + with 0.6 mm micromet, ypT1b [id number] cM0  XRT 5/14/24-7/1/24  Anastrozole 7/8/24- current  Ribociclib 1/21/25- current  DR to 200 mg after first cycle    History of Present Illness:   An abnormal mammogram led to the pathology above.      Interval history:  Patient presents today for follow up and treatment. Reports gr 1 fatigue, gr 1 nausea, gr 1 hot flashes, gr 1 insomnia, gr 1 anxiety/depression, gr 1 mouth sores, gr 1 tachycardia, gr 1 loss of libido, gr 1 vaginal dryness.     FH:  mother with breast cancer, dx at age 83; maternal aunt with breast cancer, dx 50-60s and lung cancer; maternal aunt:  ovarian cancer, dx 40s; father with colon cancer dx 60s    LMP 8/2023    Past Medical History:   Diagnosis Date    Anxiety     Breast cancer (HCC)     Deviated septum     History of therapeutic radiation     Hx antineoplastic chemo     Hx of blood clots     near port a cath area- eliquis therapy    Invasive ductal carcinoma of breast, female, left (HCC)

## 2025-07-28 ENCOUNTER — HOSPITAL ENCOUNTER (OUTPATIENT)
Facility: HOSPITAL | Age: 55
Setting detail: INFUSION SERIES
Discharge: HOME | End: 2025-07-28
Payer: COMMERCIAL

## 2025-07-28 VITALS
SYSTOLIC BLOOD PRESSURE: 96 MMHG | OXYGEN SATURATION: 98 % | HEART RATE: 85 BPM | DIASTOLIC BLOOD PRESSURE: 66 MMHG | RESPIRATION RATE: 18 BRPM | WEIGHT: 108 LBS | BODY MASS INDEX: 18.54 KG/M2

## 2025-07-28 DIAGNOSIS — Z79.899 ENCOUNTER FOR MONITORING CARDIOTOXIC DRUG THERAPY: Primary | ICD-10-CM

## 2025-07-28 DIAGNOSIS — C50.912 INVASIVE DUCTAL CARCINOMA OF BREAST, FEMALE, LEFT (HCC): ICD-10-CM

## 2025-07-28 DIAGNOSIS — Z51.81 ENCOUNTER FOR MONITORING CARDIOTOXIC DRUG THERAPY: Primary | ICD-10-CM

## 2025-07-28 LAB
ALBUMIN SERPL-MCNC: 3.9 G/DL (ref 3.5–5)
ALBUMIN/GLOB SERPL: 1.3 (ref 1.1–2.2)
ALP SERPL-CCNC: 47 U/L (ref 45–117)
ALT SERPL-CCNC: 28 U/L (ref 12–78)
ANION GAP SERPL CALC-SCNC: 5 MMOL/L (ref 2–12)
AST SERPL-CCNC: 26 U/L (ref 15–37)
BASOPHILS # BLD: 0.11 K/UL (ref 0–0.1)
BASOPHILS NFR BLD: 5 % (ref 0–1)
BILIRUB SERPL-MCNC: 0.7 MG/DL (ref 0.2–1)
BUN SERPL-MCNC: 16 MG/DL (ref 6–20)
BUN/CREAT SERPL: 26 (ref 12–20)
CALCIUM SERPL-MCNC: 9.1 MG/DL (ref 8.5–10.1)
CHLORIDE SERPL-SCNC: 106 MMOL/L (ref 97–108)
CO2 SERPL-SCNC: 29 MMOL/L (ref 21–32)
CREAT SERPL-MCNC: 0.62 MG/DL (ref 0.55–1.02)
DIFFERENTIAL METHOD BLD: ABNORMAL
EOSINOPHIL # BLD: 0.08 K/UL (ref 0–0.4)
EOSINOPHIL NFR BLD: 4 % (ref 0–7)
ERYTHROCYTE [DISTWIDTH] IN BLOOD BY AUTOMATED COUNT: 12.6 % (ref 11.5–14.5)
GLOBULIN SER CALC-MCNC: 3 G/DL (ref 2–4)
GLUCOSE SERPL-MCNC: 111 MG/DL (ref 65–100)
HCT VFR BLD AUTO: 35.4 % (ref 35–47)
HGB BLD-MCNC: 12.2 G/DL (ref 11.5–16)
IMM GRANULOCYTES # BLD AUTO: 0 K/UL
IMM GRANULOCYTES NFR BLD AUTO: 0 %
LYMPHOCYTES # BLD: 0.78 K/UL (ref 0.8–3.5)
LYMPHOCYTES NFR BLD: 37 % (ref 12–49)
MAGNESIUM SERPL-MCNC: 2.3 MG/DL (ref 1.6–2.4)
MCH RBC QN AUTO: 32.7 PG (ref 26–34)
MCHC RBC AUTO-ENTMCNC: 34.5 G/DL (ref 30–36.5)
MCV RBC AUTO: 94.9 FL (ref 80–99)
MONOCYTES # BLD: 0.11 K/UL (ref 0–1)
MONOCYTES NFR BLD: 5 % (ref 5–13)
NEUTS BAND NFR BLD MANUAL: 1 % (ref 0–6)
NEUTS SEG # BLD: 1.02 K/UL (ref 1.8–8)
NEUTS SEG NFR BLD: 48 % (ref 32–75)
NRBC # BLD: 0 K/UL (ref 0–0.01)
NRBC BLD-RTO: 0 PER 100 WBC
PHOSPHATE SERPL-MCNC: 2.8 MG/DL (ref 2.6–4.7)
PLATELET # BLD AUTO: 157 K/UL (ref 150–400)
PMV BLD AUTO: 8.7 FL (ref 8.9–12.9)
POTASSIUM SERPL-SCNC: 3.7 MMOL/L (ref 3.5–5.1)
PROT SERPL-MCNC: 6.9 G/DL (ref 6.4–8.2)
RBC # BLD AUTO: 3.73 M/UL (ref 3.8–5.2)
RBC MORPH BLD: ABNORMAL
SODIUM SERPL-SCNC: 140 MMOL/L (ref 136–145)
WBC # BLD AUTO: 2.1 K/UL (ref 3.6–11)

## 2025-07-28 PROCEDURE — 80053 COMPREHEN METABOLIC PANEL: CPT

## 2025-07-28 PROCEDURE — 85025 COMPLETE CBC W/AUTO DIFF WBC: CPT

## 2025-07-28 PROCEDURE — 36415 COLL VENOUS BLD VENIPUNCTURE: CPT

## 2025-07-28 PROCEDURE — 84100 ASSAY OF PHOSPHORUS: CPT

## 2025-07-28 PROCEDURE — 83735 ASSAY OF MAGNESIUM: CPT

## 2025-08-01 ENCOUNTER — PATIENT MESSAGE (OUTPATIENT)
Age: 55
End: 2025-08-01

## 2025-08-04 ENCOUNTER — APPOINTMENT (OUTPATIENT)
Facility: HOSPITAL | Age: 55
End: 2025-08-04
Payer: COMMERCIAL

## 2025-08-08 ENCOUNTER — PATIENT MESSAGE (OUTPATIENT)
Age: 55
End: 2025-08-08

## 2025-08-11 ENCOUNTER — APPOINTMENT (OUTPATIENT)
Facility: HOSPITAL | Age: 55
End: 2025-08-11
Payer: COMMERCIAL

## 2025-08-18 ENCOUNTER — APPOINTMENT (OUTPATIENT)
Facility: HOSPITAL | Age: 55
End: 2025-08-18
Payer: COMMERCIAL

## 2025-08-18 ENCOUNTER — HOSPITAL ENCOUNTER (OUTPATIENT)
Facility: HOSPITAL | Age: 55
Setting detail: RECURRING SERIES
Discharge: HOME | End: 2025-08-21
Payer: COMMERCIAL

## 2025-08-18 VITALS — BODY MASS INDEX: 18.13 KG/M2 | WEIGHT: 106.2 LBS | HEIGHT: 64 IN

## 2025-08-18 PROCEDURE — 97535 SELF CARE MNGMENT TRAINING: CPT

## 2025-08-18 PROCEDURE — 97140 MANUAL THERAPY 1/> REGIONS: CPT

## 2025-08-18 PROCEDURE — 97110 THERAPEUTIC EXERCISES: CPT

## 2025-08-18 PROCEDURE — 97161 PT EVAL LOW COMPLEX 20 MIN: CPT

## 2025-08-19 ENCOUNTER — PATIENT MESSAGE (OUTPATIENT)
Age: 55
End: 2025-08-19

## 2025-08-25 ENCOUNTER — OFFICE VISIT (OUTPATIENT)
Age: 55
End: 2025-08-25
Payer: COMMERCIAL

## 2025-08-25 ENCOUNTER — HOSPITAL ENCOUNTER (OUTPATIENT)
Facility: HOSPITAL | Age: 55
Setting detail: INFUSION SERIES
Discharge: HOME | End: 2025-08-25
Payer: COMMERCIAL

## 2025-08-25 VITALS
HEART RATE: 74 BPM | RESPIRATION RATE: 18 BRPM | DIASTOLIC BLOOD PRESSURE: 68 MMHG | TEMPERATURE: 97.5 F | SYSTOLIC BLOOD PRESSURE: 112 MMHG | OXYGEN SATURATION: 98 %

## 2025-08-25 VITALS
RESPIRATION RATE: 18 BRPM | BODY MASS INDEX: 18.1 KG/M2 | TEMPERATURE: 97.5 F | HEART RATE: 74 BPM | WEIGHT: 106 LBS | HEIGHT: 64 IN | OXYGEN SATURATION: 98 % | SYSTOLIC BLOOD PRESSURE: 112 MMHG | DIASTOLIC BLOOD PRESSURE: 68 MMHG

## 2025-08-25 DIAGNOSIS — C50.912 INVASIVE DUCTAL CARCINOMA OF BREAST, FEMALE, LEFT (HCC): ICD-10-CM

## 2025-08-25 DIAGNOSIS — Z51.81 ENCOUNTER FOR MONITORING CARDIOTOXIC DRUG THERAPY: Primary | ICD-10-CM

## 2025-08-25 DIAGNOSIS — C50.112 MALIGNANT NEOPLASM OF CENTRAL PORTION OF LEFT BREAST IN FEMALE, ESTROGEN RECEPTOR POSITIVE (HCC): Primary | ICD-10-CM

## 2025-08-25 DIAGNOSIS — Z79.899 ENCOUNTER FOR MONITORING CARDIOTOXIC DRUG THERAPY: Primary | ICD-10-CM

## 2025-08-25 DIAGNOSIS — Z17.0 MALIGNANT NEOPLASM OF CENTRAL PORTION OF LEFT BREAST IN FEMALE, ESTROGEN RECEPTOR POSITIVE (HCC): Primary | ICD-10-CM

## 2025-08-25 DIAGNOSIS — Z51.81 ENCOUNTER FOR THERAPEUTIC DRUG MONITORING: ICD-10-CM

## 2025-08-25 LAB
ALBUMIN SERPL-MCNC: 4.2 G/DL (ref 3.5–5.2)
ALBUMIN/GLOB SERPL: 1.4 (ref 1.1–2.2)
ALP SERPL-CCNC: 42 U/L (ref 35–104)
ALT SERPL-CCNC: 21 U/L (ref 10–35)
ANION GAP SERPL CALC-SCNC: 9 MMOL/L (ref 2–14)
APPEARANCE UR: CLEAR
AST SERPL-CCNC: 29 U/L (ref 10–35)
BACTERIA URNS QL MICRO: NEGATIVE /HPF
BASOPHILS # BLD: 0.08 K/UL (ref 0–0.1)
BASOPHILS NFR BLD: 4 % (ref 0–1)
BILIRUB SERPL-MCNC: 0.6 MG/DL (ref 0–1.2)
BILIRUB UR QL: NEGATIVE
BUN SERPL-MCNC: 19 MG/DL (ref 6–20)
BUN/CREAT SERPL: 28 (ref 12–20)
CALCIUM SERPL-MCNC: 9.8 MG/DL (ref 8.6–10)
CHLORIDE SERPL-SCNC: 103 MMOL/L (ref 98–107)
CO2 SERPL-SCNC: 30 MMOL/L (ref 20–29)
COLOR UR: ABNORMAL
CREAT SERPL-MCNC: 0.67 MG/DL (ref 0.6–1)
DIFFERENTIAL METHOD BLD: ABNORMAL
EOSINOPHIL # BLD: 0.04 K/UL (ref 0–0.4)
EOSINOPHIL NFR BLD: 2 % (ref 0–7)
EPITH CASTS URNS QL MICRO: ABNORMAL /LPF
ERYTHROCYTE [DISTWIDTH] IN BLOOD BY AUTOMATED COUNT: 12.4 % (ref 11.5–14.5)
GLOBULIN SER CALC-MCNC: 3 G/DL (ref 2–4)
GLUCOSE SERPL-MCNC: 76 MG/DL (ref 65–100)
GLUCOSE UR STRIP.AUTO-MCNC: NEGATIVE MG/DL
HCT VFR BLD AUTO: 36.4 % (ref 35–47)
HGB BLD-MCNC: 12.9 G/DL (ref 11.5–16)
HGB UR QL STRIP: NEGATIVE
HYALINE CASTS URNS QL MICRO: ABNORMAL /LPF (ref 0–2)
IMM GRANULOCYTES # BLD AUTO: 0 K/UL
IMM GRANULOCYTES NFR BLD AUTO: 0 %
KETONES UR QL STRIP.AUTO: ABNORMAL MG/DL
LEUKOCYTE ESTERASE UR QL STRIP.AUTO: ABNORMAL
LYMPHOCYTES # BLD: 0.76 K/UL (ref 0.8–3.5)
LYMPHOCYTES NFR BLD: 40 % (ref 12–49)
MAGNESIUM SERPL-MCNC: 2.1 MG/DL (ref 1.6–2.6)
MCH RBC QN AUTO: 32.3 PG (ref 26–34)
MCHC RBC AUTO-ENTMCNC: 35.4 G/DL (ref 30–36.5)
MCV RBC AUTO: 91.2 FL (ref 80–99)
MONOCYTES # BLD: 0.17 K/UL (ref 0–1)
MONOCYTES NFR BLD: 9 % (ref 5–13)
NEUTS SEG # BLD: 0.85 K/UL (ref 1.8–8)
NEUTS SEG NFR BLD: 45 % (ref 32–75)
NITRITE UR QL STRIP.AUTO: NEGATIVE
NRBC # BLD: 0 K/UL (ref 0–0.01)
NRBC BLD-RTO: 0 PER 100 WBC
PH UR STRIP: 6 (ref 5–8)
PHOSPHATE SERPL-MCNC: 3.4 MG/DL (ref 2.5–4.5)
PLATELET # BLD AUTO: 135 K/UL (ref 150–400)
PMV BLD AUTO: 8.4 FL (ref 8.9–12.9)
POTASSIUM SERPL-SCNC: 3.6 MMOL/L (ref 3.5–5.1)
PROT SERPL-MCNC: 7.3 G/DL (ref 6.4–8.3)
PROT UR STRIP-MCNC: NEGATIVE MG/DL
RBC # BLD AUTO: 3.99 M/UL (ref 3.8–5.2)
RBC #/AREA URNS HPF: ABNORMAL /HPF (ref 0–5)
RBC MORPH BLD: ABNORMAL
SODIUM SERPL-SCNC: 142 MMOL/L (ref 136–145)
SP GR UR REFRACTOMETRY: 1.02 (ref 1–1.03)
URINE CULTURE IF INDICATED: ABNORMAL
UROBILINOGEN UR QL STRIP.AUTO: 0.2 EU/DL (ref 0.2–1)
WBC # BLD AUTO: 1.9 K/UL (ref 3.6–11)
WBC MORPH BLD: ABNORMAL
WBC URNS QL MICRO: ABNORMAL /HPF (ref 0–4)

## 2025-08-25 PROCEDURE — 84100 ASSAY OF PHOSPHORUS: CPT

## 2025-08-25 PROCEDURE — 83735 ASSAY OF MAGNESIUM: CPT

## 2025-08-25 PROCEDURE — 99215 OFFICE O/P EST HI 40 MIN: CPT | Performed by: NURSE PRACTITIONER

## 2025-08-25 PROCEDURE — 81001 URINALYSIS AUTO W/SCOPE: CPT

## 2025-08-25 PROCEDURE — 80053 COMPREHEN METABOLIC PANEL: CPT

## 2025-08-25 PROCEDURE — 85025 COMPLETE CBC W/AUTO DIFF WBC: CPT

## 2025-08-25 PROCEDURE — 36415 COLL VENOUS BLD VENIPUNCTURE: CPT

## 2025-08-25 ASSESSMENT — PATIENT HEALTH QUESTIONNAIRE - PHQ9
SUM OF ALL RESPONSES TO PHQ QUESTIONS 1-9: 0
1. LITTLE INTEREST OR PLEASURE IN DOING THINGS: NOT AT ALL
SUM OF ALL RESPONSES TO PHQ QUESTIONS 1-9: 0
2. FEELING DOWN, DEPRESSED OR HOPELESS: NOT AT ALL

## 2025-08-26 ENCOUNTER — PATIENT MESSAGE (OUTPATIENT)
Age: 55
End: 2025-08-26

## 2025-09-02 ENCOUNTER — RESULTS FOLLOW-UP (OUTPATIENT)
Age: 55
End: 2025-09-02

## 2025-09-02 ENCOUNTER — HOSPITAL ENCOUNTER (OUTPATIENT)
Facility: HOSPITAL | Age: 55
Setting detail: INFUSION SERIES
Discharge: HOME | End: 2025-09-02
Payer: COMMERCIAL

## 2025-09-02 VITALS
TEMPERATURE: 97.4 F | OXYGEN SATURATION: 99 % | RESPIRATION RATE: 18 BRPM | DIASTOLIC BLOOD PRESSURE: 63 MMHG | SYSTOLIC BLOOD PRESSURE: 98 MMHG

## 2025-09-02 DIAGNOSIS — Z79.899 ENCOUNTER FOR MONITORING CARDIOTOXIC DRUG THERAPY: Primary | ICD-10-CM

## 2025-09-02 DIAGNOSIS — C50.112 MALIGNANT NEOPLASM OF CENTRAL PORTION OF LEFT BREAST IN FEMALE, ESTROGEN RECEPTOR POSITIVE (HCC): Primary | ICD-10-CM

## 2025-09-02 DIAGNOSIS — Z17.0 MALIGNANT NEOPLASM OF CENTRAL PORTION OF LEFT BREAST IN FEMALE, ESTROGEN RECEPTOR POSITIVE (HCC): Primary | ICD-10-CM

## 2025-09-02 DIAGNOSIS — Z51.81 ENCOUNTER FOR MONITORING CARDIOTOXIC DRUG THERAPY: Primary | ICD-10-CM

## 2025-09-02 DIAGNOSIS — C50.912 INVASIVE DUCTAL CARCINOMA OF BREAST, FEMALE, LEFT (HCC): ICD-10-CM

## 2025-09-02 LAB
BASOPHILS # BLD: 0.03 K/UL (ref 0–0.1)
BASOPHILS NFR BLD: 1.3 % (ref 0–1)
DIFFERENTIAL METHOD BLD: ABNORMAL
EOSINOPHIL # BLD: 0.03 K/UL (ref 0–0.4)
EOSINOPHIL NFR BLD: 1.3 % (ref 0–7)
ERYTHROCYTE [DISTWIDTH] IN BLOOD BY AUTOMATED COUNT: 12.4 % (ref 11.5–14.5)
HCT VFR BLD AUTO: 36 % (ref 35–47)
HGB BLD-MCNC: 12.8 G/DL (ref 11.5–16)
IMM GRANULOCYTES # BLD AUTO: 0.01 K/UL (ref 0–0.04)
IMM GRANULOCYTES NFR BLD AUTO: 0.4 % (ref 0–0.5)
LYMPHOCYTES # BLD: 0.68 K/UL (ref 0.8–3.5)
LYMPHOCYTES NFR BLD: 28.5 % (ref 12–49)
MCH RBC QN AUTO: 33.2 PG (ref 26–34)
MCHC RBC AUTO-ENTMCNC: 35.6 G/DL (ref 30–36.5)
MCV RBC AUTO: 93.3 FL (ref 80–99)
MONOCYTES # BLD: 0.25 K/UL (ref 0–1)
MONOCYTES NFR BLD: 10.6 % (ref 5–13)
NEUTS SEG # BLD: 1.39 K/UL (ref 1.8–8)
NEUTS SEG NFR BLD: 57.9 % (ref 32–75)
NRBC # BLD: 0 K/UL (ref 0–0.01)
NRBC BLD-RTO: 0 PER 100 WBC
PLATELET # BLD AUTO: 151 K/UL (ref 150–400)
PMV BLD AUTO: 9 FL (ref 8.9–12.9)
RBC # BLD AUTO: 3.86 M/UL (ref 3.8–5.2)
RBC MORPH BLD: ABNORMAL
WBC # BLD AUTO: 2.4 K/UL (ref 3.6–11)

## 2025-09-02 PROCEDURE — 85025 COMPLETE CBC W/AUTO DIFF WBC: CPT

## 2025-09-02 PROCEDURE — 36415 COLL VENOUS BLD VENIPUNCTURE: CPT

## 2025-09-15 ENCOUNTER — APPOINTMENT (OUTPATIENT)
Facility: HOSPITAL | Age: 55
End: 2025-09-15
Payer: COMMERCIAL

## 2025-09-18 ENCOUNTER — APPOINTMENT (OUTPATIENT)
Facility: HOSPITAL | Age: 55
End: 2025-09-18
Payer: COMMERCIAL

## (undated) DEVICE — ELECTRODE PT RET AD L9FT HI MOIST COND ADH HYDRGEL CORDED

## (undated) DEVICE — GLOVE ORANGE PI 7 1/2   MSG9075

## (undated) DEVICE — HYPODERMIC SAFETY NEEDLE: Brand: MAGELLAN

## (undated) DEVICE — DECANTER BAG 9": Brand: MEDLINE INDUSTRIES, INC.

## (undated) DEVICE — SOLUTION IRRIG 500ML 0.9% SOD CHLO USP POUR PLAS BTL

## (undated) DEVICE — INTENT OT USE PROVIDES A STERILE INTERFACE BETWEEN THE OPERATING ROOM SURGICAL LAMPS (NON-STERILE) AND THE SURGEON OR STAFF WORKING IN THE STERILE FIELD.: Brand: ASPEN® ALC PLUS LIGHT HANDLE COVER

## (undated) DEVICE — PENCIL SMK EVAC L10FT DIA95MM TBNG NONSTICK W ADPT TO 22MM

## (undated) DEVICE — APPLICATOR MEDICATED 26 CC SOLUTION HI LT ORNG CHLORAPREP

## (undated) DEVICE — C-ARM: Brand: UNBRANDED

## (undated) DEVICE — MINOR BASIN -SMH: Brand: MEDLINE INDUSTRIES, INC.

## (undated) DEVICE — SUTURE MCRYL SZ 4-0 L27IN ABSRB UD L19MM PS-2 1/2 CIR PRIM Y426H

## (undated) DEVICE — DRAPE,LAPAROTOMY,T,PEDI,STERILE: Brand: MEDLINE

## (undated) DEVICE — PENCIL SMK EVAC ALL IN 1 DSGN ENH VISIBILITY IMPROVED AIR

## (undated) DEVICE — SOLUTION IV 100ML 0.9% SOD CHL DIL INJ

## (undated) DEVICE — SOLUTION IRRIG 1000ML STRL H2O USP PLAS POUR BTL

## (undated) DEVICE — SUTURE PERMA-HAND SZ 2-0 L30IN NONABSORBABLE BLK L26MM SH K833H

## (undated) DEVICE — SUTURE VCRL SZ 3-0 L27IN ABSRB UD L26MM SH 1/2 CIR J416H

## (undated) DEVICE — CHEST PACK-SFMCASU: Brand: MEDLINE INDUSTRIES, INC.

## (undated) DEVICE — GOWN,AURORA,NON-REINFORCED,2XL: Brand: MEDLINE

## (undated) DEVICE — SYRINGE MED 10ML LUERLOCK TIP W/O SFTY DISP

## (undated) DEVICE — TRANSFER SET 3": Brand: MEDLINE INDUSTRIES, INC.

## (undated) DEVICE — HYPODERMIC SAFETY NEEDLE: Brand: MONOJECT

## (undated) DEVICE — BLADE ES ELASTOMERIC COAT INSUL DURABLE BEND UPTO 90DEG

## (undated) DEVICE — LIQUIBAND RAPID ADHESIVE 36/CS 0.8ML: Brand: MEDLINE